# Patient Record
Sex: FEMALE | Race: WHITE | Employment: FULL TIME | URBAN - METROPOLITAN AREA
[De-identification: names, ages, dates, MRNs, and addresses within clinical notes are randomized per-mention and may not be internally consistent; named-entity substitution may affect disease eponyms.]

---

## 2017-06-30 ENCOUNTER — APPOINTMENT (OUTPATIENT)
Dept: GENERAL RADIOLOGY | Age: 67
End: 2017-06-30
Attending: PHYSICIAN ASSISTANT
Payer: COMMERCIAL

## 2017-06-30 ENCOUNTER — APPOINTMENT (OUTPATIENT)
Dept: MRI IMAGING | Age: 67
End: 2017-06-30
Attending: PHYSICIAN ASSISTANT
Payer: COMMERCIAL

## 2017-06-30 ENCOUNTER — HOSPITAL ENCOUNTER (EMERGENCY)
Age: 67
Discharge: HOME | End: 2017-06-30
Attending: EMERGENCY MEDICINE
Payer: COMMERCIAL

## 2017-06-30 VITALS
TEMPERATURE: 98 F | HEIGHT: 67 IN | OXYGEN SATURATION: 93 % | RESPIRATION RATE: 16 BRPM | BODY MASS INDEX: 35.94 KG/M2 | SYSTOLIC BLOOD PRESSURE: 131 MMHG | WEIGHT: 229 LBS | HEART RATE: 66 BPM | DIASTOLIC BLOOD PRESSURE: 70 MMHG

## 2017-06-30 DIAGNOSIS — S30.0XXA CONTUSION OF LOWER BACK, INITIAL ENCOUNTER: ICD-10-CM

## 2017-06-30 DIAGNOSIS — M51.9 LUMBAR DISC DISEASE: ICD-10-CM

## 2017-06-30 DIAGNOSIS — M48.02 CERVICAL SPINAL STENOSIS: ICD-10-CM

## 2017-06-30 DIAGNOSIS — S16.1XXA CERVICAL STRAIN, ACUTE, INITIAL ENCOUNTER: ICD-10-CM

## 2017-06-30 DIAGNOSIS — S40.011A CONTUSION OF RIGHT SHOULDER, INITIAL ENCOUNTER: ICD-10-CM
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
## 2017-06-30 PROCEDURE — 74011250637 HC RX REV CODE- 250/637: Performed by: PHYSICIAN ASSISTANT

## 2017-06-30 PROCEDURE — 73030 X-RAY EXAM OF SHOULDER: CPT

## 2017-06-30 PROCEDURE — 72100 X-RAY EXAM L-S SPINE 2/3 VWS: CPT

## 2017-06-30 PROCEDURE — 72052 X-RAY EXAM NECK SPINE 6/>VWS: CPT

## 2017-06-30 PROCEDURE — 72141 MRI NECK SPINE W/O DYE: CPT

## 2017-06-30 PROCEDURE — 72148 MRI LUMBAR SPINE W/O DYE: CPT

## 2017-06-30 PROCEDURE — 99284 EMERGENCY DEPT VISIT MOD MDM: CPT

## 2017-06-30 RX ORDER — HYDROCODONE BITARTRATE AND ACETAMINOPHEN 5; 325 MG/1; MG/1
1 TABLET ORAL
Status: COMPLETED | OUTPATIENT
Start: 2017-06-30 | End: 2017-06-30

## 2017-06-30 RX ORDER — HYDROCODONE BITARTRATE AND ACETAMINOPHEN 5; 325 MG/1; MG/1
1 TABLET ORAL
Qty: 15 TAB | Refills: 0 | Status: SHIPPED | OUTPATIENT
Start: 2017-06-30 | End: 2017-08-03

## 2017-06-30 RX ADMIN — HYDROCODONE BITARTRATE AND ACETAMINOPHEN 1 TABLET: 5; 325 TABLET ORAL at 14:29

## 2017-06-30 NOTE — ED TRIAGE NOTES
\"I slipped and fell onto my right shoulder this morning at 0530. I have pain in both shoulders, my back, and the pain is running down both legs\" Denies LOC.

## 2017-06-30 NOTE — ED PROVIDER NOTES
HPI Comments: 78 y/o  female presents to the ED for the evaluation of neck, shoulder and lower back pain after slipping and falling this morning around 5:30 AM.  According to patient, it was dark and wet and she simply slipped landing onto her back and posterior right shoulder. She denies any LOC. No headaches. She is c/o neck/shoulder and lower back pain. She also mentions some tingling in upper and lower extremities and a heaviness feeling in her lower extremities. No weakness noted. No other injuries noted. No other acute medical complaints expressed at this time. The history is provided by the patient. No  was used. Past Medical History:   Diagnosis Date    Asthma     Gastrointestinal disorder     diverticulitis    Ovarian cyst        Past Surgical History:   Procedure Laterality Date    HX APPENDECTOMY      HX CHOLECYSTECTOMY      HX HYSTERECTOMY      partial, has ovaries    HX ORTHOPAEDIC      tennis elbow         History reviewed. No pertinent family history. Social History     Social History    Marital status:      Spouse name: N/A    Number of children: N/A    Years of education: N/A     Occupational History    Not on file. Social History Main Topics    Smoking status: Never Smoker    Smokeless tobacco: Never Used    Alcohol use Yes      Comment: once per 6 months at social event    Drug use: No    Sexual activity: Not on file     Other Topics Concern    Not on file     Social History Narrative         ALLERGIES: Garlic and Penicillin g    Review of Systems   Constitutional: Negative for chills and fever. HENT: Negative for ear pain, facial swelling, hearing loss, sore throat and trouble swallowing. Eyes: Negative. Respiratory: Negative for cough, chest tightness, shortness of breath and wheezing. Cardiovascular: Negative for chest pain.    Gastrointestinal: Negative for abdominal pain, constipation, diarrhea, nausea and vomiting. Genitourinary: Negative for dysuria, flank pain, frequency, hematuria and urgency. Musculoskeletal: Positive for back pain and neck pain. Negative for neck stiffness. Skin: Negative. Neurological: Negative for dizziness, weakness, light-headedness and headaches. Tingling    Hematological: Does not bruise/bleed easily. Psychiatric/Behavioral: Negative. All other systems reviewed and are negative. Vitals:    06/30/17 1242 06/30/17 1534 06/30/17 1600   BP: 163/75 155/69 138/63   Pulse: 76 66    Resp: 18 16    Temp: 98.6 °F (37 °C)     SpO2: 96% 97% 98%   Weight: 103.9 kg (229 lb)     Height: 5' 6.5\" (1.689 m)              Physical Exam   Constitutional: She is oriented to person, place, and time. She appears well-developed and well-nourished. HENT:   Head: Normocephalic and atraumatic. Eyes: Conjunctivae and EOM are normal. Pupils are equal, round, and reactive to light. Neck: Normal range of motion and full passive range of motion without pain. Neck supple. Muscular tenderness present. No spinous process tenderness present. No rigidity. No edema, no erythema and normal range of motion present. No midline tenderness to palpation of cspine. Bilateral hand  5/5    Cardiovascular: Normal rate, regular rhythm, normal heart sounds and intact distal pulses. Exam reveals no gallop and no friction rub. No murmur heard. Pulmonary/Chest: Effort normal and breath sounds normal. No respiratory distress. She has no wheezes. She has no rales. She exhibits no tenderness. Abdominal: Soft. Bowel sounds are normal. She exhibits no distension and no mass. There is no tenderness. There is no rebound and no guarding. No aortic bruits,  No femoral bruits. 2+ femoral pulses     Musculoskeletal: Normal range of motion. She exhibits tenderness. She exhibits no edema. No TS spine pain with palpation. Mild Lspine pain with palpation.   No stepoffs, no deformity  No redness, rashes, warmth, or cellulitis. 5/5 flexion/extension of hips bilaterally  5/5 great toes strength bilaterally  5/5 dorsiflexion/plantarflexion bilaterally  Straight leg raise negative. No saddle anesthesia present. Walks on heels/toes. Neurological: She is alert and oriented to person, place, and time. She has normal strength and normal reflexes. She is not disoriented. No cranial nerve deficit or sensory deficit. She exhibits normal muscle tone. Coordination and gait normal. GCS eye subscore is 4. GCS verbal subscore is 5. GCS motor subscore is 6. She displays no Babinski's sign on the right side. She displays no Babinski's sign on the left side. Reflex Scores:       Bicep reflexes are 2+ on the right side and 2+ on the left side. Patellar reflexes are 2+ on the right side and 2+ on the left side. Negative clonus    Skin: Skin is warm and dry. No rash noted. No erythema. Psychiatric: She has a normal mood and affect. Her behavior is normal. Judgment and thought content normal.   Nursing note and vitals reviewed. [facility]  ED Course       Procedures    78 y/o female with fall and paresthesias in upper/lower extremities and heaviness feeling in lower extremities  Reviewed xr findings with patient. Despite normal neuro exam, with sensation of heaviness and paresthesias will get mri c/l spine w/o contrast after consulting radiology  Patient already starting to feel better but aware of plan for mri c/l spine  Discussed with and evaluated by Dr. Ton Coronado results with patient.     Looking back at old labs she does have hx of anemia which would likely explain the dark marrow  Will d/c home with hydrocodone and have her pcp refer her back to see pain management  Patient verbalizes understanding of dx and is aware of what s/sx to monitor that would warrant return visit to ED  Discussed with Dr. Wilfredo Fitzpatrick

## 2017-06-30 NOTE — DISCHARGE INSTRUCTIONS
Neck Strain: Care Instructions  Your Care Instructions  You have strained the muscles and ligaments in your neck. A sudden, awkward movement can strain the neck. This often occurs with falls or car accidents or during certain sports. Everyday activities like working on a computer or sleeping can also cause neck strain if they force you to hold your neck in an awkward position for a long time. It is common for neck pain to get worse for a day or two after an injury, but it should start to feel better after that. You may have more pain and stiffness for several days before it gets better. This is expected. It may take a few weeks or longer for it to heal completely. Good home treatment can help you get better faster and avoid future neck problems. Follow-up care is a key part of your treatment and safety. Be sure to make and go to all appointments, and call your doctor if you are having problems. It's also a good idea to know your test results and keep a list of the medicines you take. How can you care for yourself at home? · If you were given a neck brace (cervical collar) to limit neck motion, wear it as instructed for as many days as your doctor tells you to. Do not wear it longer than you were told to. Wearing a brace for too long can make neck stiffness worse and weaken the neck muscles. · You can try using heat or ice to see if it helps. ¨ Try using a heating pad on a low or medium setting for 15 to 20 minutes every 2 to 3 hours. Try a warm shower in place of one session with the heating pad. You can also buy single-use heat wraps that last up to 8 hours. ¨ You can also try an ice pack for 10 to 15 minutes every 2 to 3 hours. · Take pain medicines exactly as directed. ¨ If the doctor gave you a prescription medicine for pain, take it as prescribed. ¨ If you are not taking a prescription pain medicine, ask your doctor if you can take an over-the-counter medicine.   · Gently rub the area to relieve pain and help with blood flow. Do not massage the area if it hurts to do so. · Do not do anything that makes the pain worse. Take it easy for a couple of days. You can do your usual activities if they do not hurt your neck or put it at risk for more stress or injury. · Try sleeping on a special neck pillow. Place it under your neck, not under your head. Placing a tightly rolled-up towel under your neck while you sleep will also work. If you use a neck pillow or rolled towel, do not use your regular pillow at the same time. · To prevent future neck pain, do exercises to stretch and strengthen your neck and back. Learn how to use good posture, safe lifting techniques, and proper body mechanics. When should you call for help? Call 911 anytime you think you may need emergency care. For example, call if:  · You are unable to move an arm or a leg at all. Call your doctor now or seek immediate medical care if:  · You have new or worse symptoms in your arms, legs, chest, belly, or buttocks. Symptoms may include:  ¨ Numbness or tingling. ¨ Weakness. ¨ Pain. · You lose bladder or bowel control. Watch closely for changes in your health, and be sure to contact your doctor if:  · You are not getting better as expected. Where can you learn more? Go to http://miranda-maren.info/. Enter M253 in the search box to learn more about \"Neck Strain: Care Instructions. \"  Current as of: March 21, 2017  Content Version: 11.3  © 2974-5513 [employer]. Care instructions adapted under license by [employer] (which disclaims liability or warranty for this information). If you have questions about a medical condition or this instruction, always ask your healthcare professional. [place] [address] any warranty or liability for your use of this information.          Cervical Spinal Stenosis: Care Instructions  Your Care Instructions    Spinal stenosis is a narrowing of the canal that surrounds the spinal cord and nerve roots. Sometimes bone and other tissue grow into this canal and press on the nerves that branch out from the spinal cord. This can happen as a part of aging. When the narrowing happens in your neck, it's called cervical spinal stenosis. It often causes stiffness, pain, numbness, and weakness in the neck, shoulders, arms, hands, or legs. It can even cause problems with your balance, coordination, and bowel or bladder control. But some people have no symptoms. You may be able to get relief from the symptoms of spinal stenosis by taking pain medicine. Your doctor may suggest physical therapy and exercises to keep your spine strong and flexible. Some people try steroid shots to reduce swelling. If pain and numbness in your neck, arms, or legs are still so bad that you cannot do your normal activities, you may need surgery. Follow-up care is a key part of your treatment and safety. Be sure to make and go to all appointments, and call your doctor if you are having problems. It's also a good idea to know your test results and keep a list of the medicines you take. How can you care for yourself at home? · Ask your doctor if you can take an over-the-counter pain medicine, such as acetaminophen (Tylenol), ibuprofen (Advil, Motrin), or naproxen (Aleve). Be safe with medicines. Read and follow all instructions on the label. · Do not take two or more pain medicines at the same time unless the doctor told you to. Many pain medicines have acetaminophen, which is Tylenol. Too much acetaminophen (Tylenol) can be harmful. · Change positions often when you are standing or sitting. This may reduce pressure on the spinal cord and its nerves. · When you rest, use pillows or towel rolls to support your neck and head in a comfortable position. · Follow your doctor's instructions about activity. He or she may tell you not to do sports or activities that could injure your neck.   · Stretch your neck and shoulders as your doctor or physical therapist recommends. If your doctor says it is okay to do them, these exercises may help:  ¨ Neck stretches to the side. Keep your shoulders relaxed and slowly tilt your head straight over toward one shoulder. Hold for 15 seconds. Let the weight of your head stretch your muscles. Then do the same toward the other shoulder. ¨ Neck rotations. Keep your chin level and slowly turn your head to one side. Hold for 15 seconds. Then do the same to the other side. ¨ Shoulder rolls. Roll your shoulders up, then back, and then down in a smooth, circular motion. Repeat several times. When should you call for help? Call 911 anytime you think you may need emergency care. For example, call if:  · You are unable to move an arm or a leg at all. Call your doctor now or seek immediate medical care if:  · You have new or worse symptoms in your neck, arms, or legs. Symptoms may include:  ¨ Numbness or tingling. ¨ Weakness. ¨ Pain. · You lose bladder or bowel control. Watch closely for changes in your health, and be sure to contact your doctor if:  · You are not getting better as expected. Where can you learn more? Go to http://mirandaOY LX Therapiesmaren.info/. Enter  in the search box to learn more about \"Cervical Spinal Stenosis: Care Instructions. \"  Current as of: March 21, 2017  Content Version: 11.3  © 0001-9839 [employer]. Care instructions adapted under license by [employer] (which disclaims liability or warranty for this information). If you have questions about a medical condition or this instruction, always ask your healthcare professional. [address] any warranty or liability for your use of this information. Low Back Contusion: Care Instructions  Your Care Instructions  Contusion is the medical term for a bruise.  When you have a low back bruise, it's often caused by a direct blow or an impact, such as falling against a counter or table. Bruises are common sports injuries. Most people think of a bruise as a black-and-blue spot. This happens when small blood vessels get torn and leak blood under the skin. But bones, muscles, and organs can also get bruised. If these deep tissues are damaged, you may not always see a bruise. The doctor will examine your bruise. You may also have tests to make sure you do not have a more serious injury, such as a broken bone or nerve damage. Tests may include X-rays or other imaging tests like a CT scan or MRI. Low back bruises may cause pain and swelling. But if there is no serious damage, they will often get better with home treatment in several days to a few weeks. The doctor has checked you carefully, but problems can develop later. If you notice any problems or new symptoms, get medical treatment right away. Follow-up care is a key part of your treatment and safety. Be sure to make and go to all appointments, and call your doctor if you are having problems. It's also a good idea to know your test results and keep a list of the medicines you take. How can you care for yourself at home? · Put ice or a cold pack on the sore area for 10 to 20 minutes at a time to stop swelling. Put a thin cloth between the ice pack and your skin. · Be safe with medicines. Read and follow all instructions on the label. ¨ If the doctor gave you a prescription medicine for pain, take it as prescribed. ¨ If you are not taking a prescription pain medicine, ask your doctor if you can take an over-the-counter medicine. · For the first day or two of pain, take it easy. But as soon as possible, get back to your normal daily life and activities. · Get gentle exercise, such as walking. Movement keeps your spine flexible and helps your muscles stay strong. When should you call for help? Call 911 anytime you think you may need emergency care.  For example, call if:  · You are unable to move a leg at all. Call your doctor now or seek immediate medical care if:  · You have new or worse symptoms in your legs or buttocks. Symptoms may include:  ¨ Numbness or tingling. ¨ Weakness. ¨ Pain. · You lose bladder or bowel control. · You have blood in your urine. Watch closely for changes in your health, and be sure to contact your doctor if:  · You do not get better as expected. Where can you learn more? Go to http://miranda-maren.info/. Enter V337 in the search box to learn more about \"Low Back Contusion: Care Instructions. \"  Current as of: March 20, 2017  Content Version: 11.3  © 1642-0807 [facility]. Care instructions adapted under license by [employer] (which disclaims liability or warranty for this information). If you have questions about a medical condition or this instruction, always ask your healthcare professional. [address] any warranty or liability for your use of this information.

## 2017-08-03 ENCOUNTER — APPOINTMENT (OUTPATIENT)
Dept: ULTRASOUND IMAGING | Age: 67
DRG: 603 | End: 2017-08-03
Attending: EMERGENCY MEDICINE
Payer: COMMERCIAL

## 2017-08-03 ENCOUNTER — APPOINTMENT (OUTPATIENT)
Dept: GENERAL RADIOLOGY | Age: 67
DRG: 603 | End: 2017-08-03
Attending: EMERGENCY MEDICINE
Payer: COMMERCIAL

## 2017-08-03 ENCOUNTER — HOSPITAL ENCOUNTER (INPATIENT)
Age: 67
LOS: 4 days | Discharge: HOME | DRG: 603 | End: 2017-08-07
Attending: EMERGENCY MEDICINE | Admitting: HOSPITALIST
Payer: COMMERCIAL

## 2017-08-03 DIAGNOSIS — L03.116 CELLULITIS OF LEFT LOWER EXTREMITY: Primary | ICD-10-CM

## 2017-08-03 LAB
ALBUMIN SERPL BCP-MCNC: 3.7 G/DL (ref 3.5–5)
ALBUMIN/GLOB SERPL: 0.9 {RATIO} (ref 1.1–2.2)
ALP SERPL-CCNC: 78 U/L (ref 45–117)
ALT SERPL-CCNC: 15 U/L (ref 12–78)
ANION GAP BLD CALC-SCNC: 8 MMOL/L (ref 5–15)
AST SERPL W P-5'-P-CCNC: 12 U/L (ref 15–37)
BASOPHILS # BLD AUTO: 0 K/UL (ref 0–0.1)
BASOPHILS # BLD: 0 % (ref 0–1)
BILIRUB SERPL-MCNC: 0.3 MG/DL (ref 0.2–1)
BUN SERPL-MCNC: 24 MG/DL (ref 6–20)
BUN/CREAT SERPL: 28 (ref 12–20)
CALCIUM SERPL-MCNC: 9 MG/DL (ref 8.5–10.1)
CHLORIDE SERPL-SCNC: 98 MMOL/L (ref 97–108)
CO2 SERPL-SCNC: 29 MMOL/L (ref 21–32)
CREAT SERPL-MCNC: 0.86 MG/DL (ref 0.55–1.02)
EOSINOPHIL # BLD: 0.3 K/UL (ref 0–0.4)
EOSINOPHIL NFR BLD: 3 % (ref 0–7)
ERYTHROCYTE [DISTWIDTH] IN BLOOD BY AUTOMATED COUNT: 13.2 % (ref 11.5–14.5)
GLOBULIN SER CALC-MCNC: 4.1 G/DL (ref 2–4)
GLUCOSE SERPL-MCNC: 137 MG/DL (ref 65–100)
HCT VFR BLD AUTO: 36.1 % (ref 35–47)
HGB BLD-MCNC: 11.7 G/DL (ref 11.5–16)
LACTATE SERPL-SCNC: 2 MMOL/L (ref 0.4–2)
LYMPHOCYTES # BLD AUTO: 10 % (ref 12–49)
LYMPHOCYTES # BLD: 1.1 K/UL (ref 0.8–3.5)
MCH RBC QN AUTO: 28.7 PG (ref 26–34)
MCHC RBC AUTO-ENTMCNC: 32.4 G/DL (ref 30–36.5)
MCV RBC AUTO: 88.7 FL (ref 80–99)
MONOCYTES # BLD: 0.7 K/UL (ref 0–1)
MONOCYTES NFR BLD AUTO: 6 % (ref 5–13)
NEUTS SEG # BLD: 9.2 K/UL (ref 1.8–8)
NEUTS SEG NFR BLD AUTO: 81 % (ref 32–75)
PLATELET # BLD AUTO: 283 K/UL (ref 150–400)
POTASSIUM SERPL-SCNC: 3.5 MMOL/L (ref 3.5–5.1)
PROT SERPL-MCNC: 7.8 G/DL (ref 6.4–8.2)
RBC # BLD AUTO: 4.07 M/UL (ref 3.8–5.2)
SODIUM SERPL-SCNC: 135 MMOL/L (ref 136–145)
WBC # BLD AUTO: 11.4 K/UL (ref 3.6–11)

## 2017-08-03 PROCEDURE — 87040 BLOOD CULTURE FOR BACTERIA: CPT | Performed by: EMERGENCY MEDICINE

## 2017-08-03 PROCEDURE — 99284 EMERGENCY DEPT VISIT MOD MDM: CPT

## 2017-08-03 PROCEDURE — 85025 COMPLETE CBC W/AUTO DIFF WBC: CPT | Performed by: EMERGENCY MEDICINE

## 2017-08-03 PROCEDURE — 36415 COLL VENOUS BLD VENIPUNCTURE: CPT | Performed by: EMERGENCY MEDICINE

## 2017-08-03 PROCEDURE — 65270000029 HC RM PRIVATE

## 2017-08-03 PROCEDURE — 96361 HYDRATE IV INFUSION ADD-ON: CPT

## 2017-08-03 PROCEDURE — 80053 COMPREHEN METABOLIC PANEL: CPT | Performed by: EMERGENCY MEDICINE

## 2017-08-03 PROCEDURE — 73630 X-RAY EXAM OF FOOT: CPT

## 2017-08-03 PROCEDURE — 74011250636 HC RX REV CODE- 250/636: Performed by: EMERGENCY MEDICINE

## 2017-08-03 PROCEDURE — 76999 ECHO EXAMINATION PROCEDURE: CPT

## 2017-08-03 PROCEDURE — 83605 ASSAY OF LACTIC ACID: CPT | Performed by: EMERGENCY MEDICINE

## 2017-08-03 PROCEDURE — 96365 THER/PROPH/DIAG IV INF INIT: CPT

## 2017-08-03 PROCEDURE — 96375 TX/PRO/DX INJ NEW DRUG ADDON: CPT

## 2017-08-03 RX ORDER — ACETAMINOPHEN 325 MG/1
650 TABLET ORAL
Status: DISCONTINUED | OUTPATIENT
Start: 2017-08-03 | End: 2017-08-07 | Stop reason: HOSPADM

## 2017-08-03 RX ORDER — CLINDAMYCIN PHOSPHATE 600 MG/50ML
600 INJECTION INTRAVENOUS EVERY 12 HOURS
Status: DISCONTINUED | OUTPATIENT
Start: 2017-08-04 | End: 2017-08-07 | Stop reason: HOSPADM

## 2017-08-03 RX ORDER — SODIUM CHLORIDE 9 MG/ML
75 INJECTION, SOLUTION INTRAVENOUS CONTINUOUS
Status: DISCONTINUED | OUTPATIENT
Start: 2017-08-03 | End: 2017-08-07 | Stop reason: HOSPADM

## 2017-08-03 RX ORDER — ACETAMINOPHEN 325 MG/1
650 TABLET ORAL
Status: DISCONTINUED | OUTPATIENT
Start: 2017-08-03 | End: 2017-08-03

## 2017-08-03 RX ORDER — SODIUM CHLORIDE 0.9 % (FLUSH) 0.9 %
5-10 SYRINGE (ML) INJECTION AS NEEDED
Status: DISCONTINUED | OUTPATIENT
Start: 2017-08-03 | End: 2017-08-07 | Stop reason: HOSPADM

## 2017-08-03 RX ORDER — MORPHINE SULFATE 10 MG/ML
6 INJECTION, SOLUTION INTRAMUSCULAR; INTRAVENOUS
Status: COMPLETED | OUTPATIENT
Start: 2017-08-03 | End: 2017-08-03

## 2017-08-03 RX ORDER — SODIUM CHLORIDE 0.9 % (FLUSH) 0.9 %
5-10 SYRINGE (ML) INJECTION EVERY 8 HOURS
Status: DISCONTINUED | OUTPATIENT
Start: 2017-08-03 | End: 2017-08-07 | Stop reason: HOSPADM

## 2017-08-03 RX ORDER — CLINDAMYCIN PHOSPHATE 600 MG/50ML
600 INJECTION INTRAVENOUS
Status: COMPLETED | OUTPATIENT
Start: 2017-08-03 | End: 2017-08-03

## 2017-08-03 RX ORDER — ENOXAPARIN SODIUM 100 MG/ML
40 INJECTION SUBCUTANEOUS EVERY 24 HOURS
Status: DISCONTINUED | OUTPATIENT
Start: 2017-08-04 | End: 2017-08-07 | Stop reason: HOSPADM

## 2017-08-03 RX ADMIN — SODIUM CHLORIDE 1000 ML: 900 INJECTION, SOLUTION INTRAVENOUS at 21:50

## 2017-08-03 RX ADMIN — CLINDAMYCIN PHOSPHATE 600 MG: 12 INJECTION, SOLUTION INTRAMUSCULAR; INTRAVENOUS at 21:50

## 2017-08-03 RX ADMIN — MORPHINE SULFATE 6 MG: 10 INJECTION, SOLUTION INTRAMUSCULAR; INTRAVENOUS at 21:50

## 2017-08-03 NOTE — IP AVS SNAPSHOT
[address] [facility] [address] [place] [address] 
[phone] Patient: Rochelle Donnelly 
MRN: [id number] You are allergic to the following Allergen Reactions Garlic Hives Penicillin G Hives Recent Documentation Height Weight BMI OB Status Smoking Status 1.676 m 103.9 kg 36.96 kg/m2 Hysterectomy Never Smoker Unresulted Labs Order Current Status CULTURE, BLOOD Preliminary result CULTURE, BLOOD Preliminary result Emergency Contacts Name Discharge Info Relation Home Work Mobile [facility] DISCHARGE CAREGIVER [3] Sister [23] [phone] About your hospitalization You were admitted on:  August 3, 2017 You last received care in the:  [facility] [phone] You were discharged on:  August 7, 2017 Unit phone number:  [phone] Why you were hospitalized Your primary diagnosis was:  Not on File Your diagnoses also included:  Cellulitis Of Left Lower Extremity Providers Seen During Your Hospitalizations Provider Role Specialty Primary office phone Micah Rios MD Attending Provider Emergency Medicine [phone] Maury Fontenot MD Attending Provider [facility] [phone] Marco Michel MD Attending Provider Internal Medicine [phone] Your Primary Care Physician (PCP) Primary Care Physician Office Phone Office Fax OTHER, PHYS ** None ** ** None ** Follow-up Information Follow up With Details Comments Contact Info Lindsay Barron DPM Schedule an appointment as soon as possible for a visit in 1 week  2008 [address]101 [facility] [address] [facility] [address] [place] [address] 
[phone] Current Discharge Medication List  
  
START taking these medications Dose & Instructions Dispensing Information Comments Morning Noon Evening Bedtime clindamycin 300 mg capsule Commonly known as:  CLEOCIN Your last dose was: Your next dose is:    
   
   
 Dose:  300 mg Take 1 Cap by mouth three (3) times daily. Quantity:  45 Cap Refills:  0  
     
   
   
   
  
 traMADol 100 mg tablet Commonly known as:  ULTRAM-ER Your last dose was: Your next dose is:    
   
   
 Dose:  100 mg Take 1 Tab by mouth daily. Max Daily Amount: 100 mg. Quantity:  10 Tab Refills:  0 CONTINUE these medications which have NOT CHANGED Dose & Instructions Dispensing Information Comments Morning Noon Evening Bedtime ADVAIR DISKUS 100-50 mcg/dose diskus inhaler Generic drug:  fluticasone-salmeterol Your last dose was: Your next dose is:    
   
   
 Dose:  1 Puff Take 1 Puff by inhalation every twelve (12) hours. Refills:  0  
     
   
   
   
  
 albuterol 90 mcg/actuation inhaler Commonly known as:  PROVENTIL HFA, VENTOLIN HFA, PROAIR HFA Your last dose was: Your next dose is:    
   
   
 Dose:  1 Puff Take 1 Puff by inhalation every four (4) hours as needed for Wheezing. Refills:  0  
     
   
   
   
  
 dicyclomine 10 mg capsule Commonly known as:  BENTYL Your last dose was: Your next dose is:    
   
   
 Dose:  10 mg Take 10 mg by mouth two (2) times a day. Refills:  0  
     
   
   
   
  
 escitalopram oxalate 10 mg tablet Commonly known as:  Yohana Roof Your last dose was: Your next dose is:    
   
   
 Dose:  10 mg Take 10 mg by mouth daily. Refills:  0  
     
   
   
   
  
 hydroCHLOROthiazide 25 mg tablet Commonly known as:  HYDRODIURIL Your last dose was: Your next dose is:    
   
   
 Dose:  25 mg Take 25 mg by mouth daily. Refills:  0 PREMARIN 0.625 mg tablet Generic drug:  conjugated estrogens Your last dose was: Your next dose is: Dose:  0.625 mg Take 0.625 mg by mouth daily. Refills:  0 Where to Get Your Medications Information on where to get these meds will be given to you by the nurse or doctor. ! Ask your nurse or doctor about these medications  
  clindamycin 300 mg capsule  
 traMADol 100 mg tablet Discharge Instructions Need local care in daily basis. MUST FOLLOW UP with podiatry. Return to ER if fever develop and redness and pain. Discharge Instructions 1.) Follow up with Dr. Seven Saldivar ([phone]) in one week. 2.) Apply moistened aquacell Ag and gauze dressing to left 2nd toe wound daily. 3.) Full weightbearing to left foot with surgical shoe. Elevate foot when at rest.  
   
 
 
 
Discharge Orders None Introducing [facility] SERVICES! Quentin Mina introduces [employer] patient portal. Now you can access parts of your medical record, email your doctor's office, and request medication refills online. 1. In your internet browser, go to [email or web address] 2. Click on the First Time User? Click Here link in the Sign In box. You will see the New Member Sign Up page. 3. Enter your [employer] Access Code exactly as it appears below. You will not need to use this code after youve completed the sign-up process. If you do not sign up before the expiration date, you must request a new code. · [employer] Access Code: [email or web address] Expires: 9/28/2017  7:20 PM 
 
4. Enter the last four digits of your Social Security Number (xxxx) and Date of Birth (mm/dd/yyyy) as indicated and click Submit. You will be taken to the next sign-up page. 5. Create a ThoroughCaret ID. This will be your [employer] login ID and cannot be changed, so think of one that is secure and easy to remember. 6. Create a [employer] password. You can change your password at any time. 7. Enter your Password Reset Question and Answer.  This can be used at a later time if you forget your password. 8. Enter your e-mail address. You will receive e-mail notification when new information is available in [address] 9. Click Sign Up. You can now view and download portions of your medical record. 10. Click the Download Summary menu link to download a portable copy of your medical information. If you have questions, please visit the Frequently Asked Questions section of the [employer] website. Remember, [employer] is NOT to be used for urgent needs. For medical emergencies, dial 911. Now available from your iPhone and Android! General Information Please provide this summary of care documentation to your next provider. Patient Signature:  ____________________________________________________________ Date:  ____________________________________________________________  
  
[facility] Provider Signature:  ____________________________________________________________ Date:  ____________________________________________________________

## 2017-08-03 NOTE — IP AVS SNAPSHOT
[address] 
[phone] Patient: Bj Payne 
MRN: [id number] Current Discharge Medication List  
  
START taking these medications Dose & Instructions Dispensing Information Comments Morning Noon Evening Bedtime  
 clindamycin 300 mg capsule Commonly known as:  CLEOCIN Your last dose was: Your next dose is:    
   
   
 Dose:  300 mg Take 1 Cap by mouth three (3) times daily. Quantity:  45 Cap Refills:  0  
     
   
   
   
  
 traMADol 100 mg tablet Commonly known as:  ULTRAM-ER Your last dose was: Your next dose is:    
   
   
 Dose:  100 mg Take 1 Tab by mouth daily. Max Daily Amount: 100 mg. Quantity:  10 Tab Refills:  0 CONTINUE these medications which have NOT CHANGED Dose & Instructions Dispensing Information Comments Morning Noon Evening Bedtime ADVAIR DISKUS 100-50 mcg/dose diskus inhaler Generic drug:  fluticasone-salmeterol Your last dose was: Your next dose is:    
   
   
 Dose:  1 Puff Take 1 Puff by inhalation every twelve (12) hours. Refills:  0  
     
   
   
   
  
 albuterol 90 mcg/actuation inhaler Commonly known as:  PROVENTIL HFA, VENTOLIN HFA, PROAIR HFA Your last dose was: Your next dose is:    
   
   
 Dose:  1 Puff Take 1 Puff by inhalation every four (4) hours as needed for Wheezing. Refills:  0  
     
   
   
   
  
 dicyclomine 10 mg capsule Commonly known as:  BENTYL Your last dose was: Your next dose is:    
   
   
 Dose:  10 mg Take 10 mg by mouth two (2) times a day. Refills:  0  
     
   
   
   
  
 escitalopram oxalate 10 mg tablet Commonly known as:  Argelia Ring Your last dose was: Your next dose is:    
   
   
 Dose:  10 mg Take 10 mg by mouth daily. Refills:  0 hydroCHLOROthiazide 25 mg tablet Commonly known as:  HYDRODIURIL Your last dose was: Your next dose is:    
   
   
 Dose:  25 mg Take 25 mg by mouth daily. Refills:  0 PREMARIN 0.625 mg tablet Generic drug:  conjugated estrogens Your last dose was: Your next dose is:    
   
   
 Dose:  0.625 mg Take 0.625 mg by mouth daily. Refills:  0 Where to Get Your Medications Information on where to get these meds will be given to you by the nurse or doctor. ! Ask your nurse or doctor about these medications  
  clindamycin 300 mg capsule  
 traMADol 100 mg tablet

## 2017-08-03 NOTE — IP AVS SNAPSHOT
Summary of Care Report The Summary of Care report has been created to help improve care coordination. Users with access to [employer] or [address] (Web-based application) may access additional patient information including the Discharge Summary. If you are not currently a [address] user and need more information, please call the number listed below in the [place] [address] section and ask to be connected with Medical Records. Facility Information Name Address Phone [phone] [facility] [place] [phone] Patient Information Patient Name Sex  Anthony Parker ([id number]) Female 1950 Discharge Information Admitting Provider Service Area Unit Yuriy Griggs MD / [facility] Surgical [facility] / [phone] Discharge Provider Discharge Date/Time Discharge Disposition Destination (none) 2017 Afternoon (Pending) AHR (none) Patient Language Language ENGLISH [13] Hospital Problems as of 2017  Reviewed: 2017  2:46 PM by Cale Cordon MD  
  
  
  
 Class Noted - Resolved Last Modified POA Active Problems Cellulitis of left lower extremity  8/3/2017 - Present 2017 by Cale Cordon MD Yes Entered by Yuriy Griggs MD  
  
Non-Hospital Problems as of 2017  Reviewed: 2017  2:46 PM by Cale Cordon MD  
  
  
  
 Class Noted - Resolved Last Modified Active Problems Foot abscess, left  2016 - Present 2016 by Naomy Prather MD  
  Entered by Naomy Prather MD  
  Hypokalemia  2016 - Present 2016 by Naomy Prather MD  
  Entered by Naomy Prather MD  
  
You are allergic to the following Allergen Reactions Garlic Hives Penicillin G Hives Current Discharge Medication List  
  
 START taking these medications Dose & Instructions Dispensing Information Comments  
 clindamycin 300 mg capsule Commonly known as:  CLEOCIN Dose:  300 mg Take 1 Cap by mouth three (3) times daily. Quantity:  45 Cap Refills:  0  
   
 traMADol 100 mg tablet Commonly known as:  ULTRAM-ER Dose:  100 mg Take 1 Tab by mouth daily. Max Daily Amount: 100 mg. Quantity:  10 Tab Refills:  0 CONTINUE these medications which have NOT CHANGED Dose & Instructions Dispensing Information Comments ADVAIR DISKUS 100-50 mcg/dose diskus inhaler Generic drug:  fluticasone-salmeterol Dose:  1 Puff Take 1 Puff by inhalation every twelve (12) hours. Refills:  0  
   
 albuterol 90 mcg/actuation inhaler Commonly known as:  PROVENTIL HFA, VENTOLIN HFA, PROAIR HFA Dose:  1 Puff Take 1 Puff by inhalation every four (4) hours as needed for Wheezing. Refills:  0  
   
 dicyclomine 10 mg capsule Commonly known as:  BENTYL Dose:  10 mg Take 10 mg by mouth two (2) times a day. Refills:  0  
   
 escitalopram oxalate 10 mg tablet Commonly known as:  Jolly Huy Dose:  10 mg Take 10 mg by mouth daily. Refills:  0  
   
 hydroCHLOROthiazide 25 mg tablet Commonly known as:  HYDRODIURIL Dose:  25 mg Take 25 mg by mouth daily. Refills:  0 PREMARIN 0.625 mg tablet Generic drug:  conjugated estrogens Dose:  0.625 mg Take 0.625 mg by mouth daily. Refills:  0 Current Immunizations Name Date Rabies Vaccine IM 7/11/2014 Tdap 7/11/2014 Follow-up Information Follow up With Details Comments Contact Info Elly Stone DPM Schedule an appointment as soon as possible for a visit in 1 week  [address] [place] [address] and [facility] [address] [place] [address] 
[phone] Discharge Instructions Need local care in daily basis. MUST FOLLOW UP with podiatry. Return to ER if fever develop and redness and pain. Discharge Instructions 1.) Follow up with Dr. Tara Mattson ([phone]) in one week. 2.) Apply moistened aquacell Ag and gauze dressing to left 2nd toe wound daily. 3.) Full weightbearing to left foot with surgical shoe. Elevate foot when at rest.  
   
 
 
 
Chart Review Routing History No Routing History on File

## 2017-08-04 LAB
ANION GAP BLD CALC-SCNC: 7 MMOL/L (ref 5–15)
BASOPHILS # BLD AUTO: 0 K/UL (ref 0–0.1)
BASOPHILS # BLD: 0 % (ref 0–1)
BUN SERPL-MCNC: 21 MG/DL (ref 6–20)
BUN/CREAT SERPL: 28 (ref 12–20)
CALCIUM SERPL-MCNC: 8.2 MG/DL (ref 8.5–10.1)
CHLORIDE SERPL-SCNC: 99 MMOL/L (ref 97–108)
CO2 SERPL-SCNC: 30 MMOL/L (ref 21–32)
CREAT SERPL-MCNC: 0.75 MG/DL (ref 0.55–1.02)
EOSINOPHIL # BLD: 0.2 K/UL (ref 0–0.4)
EOSINOPHIL NFR BLD: 3 % (ref 0–7)
ERYTHROCYTE [DISTWIDTH] IN BLOOD BY AUTOMATED COUNT: 13.4 % (ref 11.5–14.5)
GLUCOSE SERPL-MCNC: 134 MG/DL (ref 65–100)
HCT VFR BLD AUTO: 30.9 % (ref 35–47)
HGB BLD-MCNC: 9.8 G/DL (ref 11.5–16)
LYMPHOCYTES # BLD AUTO: 22 % (ref 12–49)
LYMPHOCYTES # BLD: 1.6 K/UL (ref 0.8–3.5)
MCH RBC QN AUTO: 28.2 PG (ref 26–34)
MCHC RBC AUTO-ENTMCNC: 31.7 G/DL (ref 30–36.5)
MCV RBC AUTO: 88.8 FL (ref 80–99)
MONOCYTES # BLD: 0.4 K/UL (ref 0–1)
MONOCYTES NFR BLD AUTO: 6 % (ref 5–13)
NEUTS SEG # BLD: 5.2 K/UL (ref 1.8–8)
NEUTS SEG NFR BLD AUTO: 69 % (ref 32–75)
PLATELET # BLD AUTO: 264 K/UL (ref 150–400)
POTASSIUM SERPL-SCNC: 3.4 MMOL/L (ref 3.5–5.1)
RBC # BLD AUTO: 3.48 M/UL (ref 3.8–5.2)
SODIUM SERPL-SCNC: 136 MMOL/L (ref 136–145)
WBC # BLD AUTO: 7.5 K/UL (ref 3.6–11)

## 2017-08-04 PROCEDURE — 74011250637 HC RX REV CODE- 250/637: Performed by: HOSPITALIST

## 2017-08-04 PROCEDURE — 87205 SMEAR GRAM STAIN: CPT | Performed by: HOSPITALIST

## 2017-08-04 PROCEDURE — 85025 COMPLETE CBC W/AUTO DIFF WBC: CPT | Performed by: HOSPITALIST

## 2017-08-04 PROCEDURE — 74011250636 HC RX REV CODE- 250/636: Performed by: HOSPITALIST

## 2017-08-04 PROCEDURE — 74011000250 HC RX REV CODE- 250: Performed by: HOSPITALIST

## 2017-08-04 PROCEDURE — 87077 CULTURE AEROBIC IDENTIFY: CPT | Performed by: HOSPITALIST

## 2017-08-04 PROCEDURE — 74011250636 HC RX REV CODE- 250/636: Performed by: NURSE PRACTITIONER

## 2017-08-04 PROCEDURE — 36415 COLL VENOUS BLD VENIPUNCTURE: CPT | Performed by: HOSPITALIST

## 2017-08-04 PROCEDURE — 65270000032 HC RM SEMIPRIVATE

## 2017-08-04 PROCEDURE — 87186 SC STD MICRODIL/AGAR DIL: CPT | Performed by: HOSPITALIST

## 2017-08-04 PROCEDURE — 77030029684 HC NEB SM VOL KT MONA -A

## 2017-08-04 PROCEDURE — 94640 AIRWAY INHALATION TREATMENT: CPT

## 2017-08-04 PROCEDURE — 87147 CULTURE TYPE IMMUNOLOGIC: CPT | Performed by: HOSPITALIST

## 2017-08-04 PROCEDURE — 80048 BASIC METABOLIC PNL TOTAL CA: CPT | Performed by: HOSPITALIST

## 2017-08-04 RX ORDER — HYDROCHLOROTHIAZIDE 25 MG/1
25 TABLET ORAL DAILY
Status: DISCONTINUED | OUTPATIENT
Start: 2017-08-04 | End: 2017-08-07 | Stop reason: HOSPADM

## 2017-08-04 RX ORDER — ESCITALOPRAM OXALATE 10 MG/1
10 TABLET ORAL DAILY
Status: DISCONTINUED | OUTPATIENT
Start: 2017-08-04 | End: 2017-08-07 | Stop reason: HOSPADM

## 2017-08-04 RX ORDER — ALBUTEROL SULFATE 0.83 MG/ML
2.5 SOLUTION RESPIRATORY (INHALATION)
Status: DISCONTINUED | OUTPATIENT
Start: 2017-08-04 | End: 2017-08-07 | Stop reason: HOSPADM

## 2017-08-04 RX ORDER — ONDANSETRON 2 MG/ML
4 INJECTION INTRAMUSCULAR; INTRAVENOUS
Status: DISCONTINUED | OUTPATIENT
Start: 2017-08-04 | End: 2017-08-07 | Stop reason: HOSPADM

## 2017-08-04 RX ORDER — MORPHINE SULFATE 2 MG/ML
2 INJECTION, SOLUTION INTRAMUSCULAR; INTRAVENOUS
Status: DISCONTINUED | OUTPATIENT
Start: 2017-08-04 | End: 2017-08-07 | Stop reason: HOSPADM

## 2017-08-04 RX ORDER — ARFORMOTEROL TARTRATE 15 UG/2ML
15 SOLUTION RESPIRATORY (INHALATION)
Status: DISCONTINUED | OUTPATIENT
Start: 2017-08-04 | End: 2017-08-07 | Stop reason: HOSPADM

## 2017-08-04 RX ORDER — FLUTICASONE PROPIONATE AND SALMETEROL 100; 50 UG/1; UG/1
1 POWDER RESPIRATORY (INHALATION) EVERY 12 HOURS
Status: DISCONTINUED | OUTPATIENT
Start: 2017-08-04 | End: 2017-08-04 | Stop reason: ALTCHOICE

## 2017-08-04 RX ORDER — DICYCLOMINE HYDROCHLORIDE 10 MG/1
10 CAPSULE ORAL 2 TIMES DAILY
Status: DISCONTINUED | OUTPATIENT
Start: 2017-08-04 | End: 2017-08-07 | Stop reason: HOSPADM

## 2017-08-04 RX ORDER — BUDESONIDE 0.5 MG/2ML
500 INHALANT ORAL
Status: DISCONTINUED | OUTPATIENT
Start: 2017-08-04 | End: 2017-08-07 | Stop reason: HOSPADM

## 2017-08-04 RX ADMIN — SODIUM CHLORIDE 75 ML/HR: 900 INJECTION, SOLUTION INTRAVENOUS at 14:15

## 2017-08-04 RX ADMIN — MORPHINE SULFATE 2 MG: 2 INJECTION, SOLUTION INTRAMUSCULAR; INTRAVENOUS at 06:28

## 2017-08-04 RX ADMIN — MORPHINE SULFATE 2 MG: 2 INJECTION, SOLUTION INTRAMUSCULAR; INTRAVENOUS at 21:33

## 2017-08-04 RX ADMIN — Medication 10 ML: at 01:40

## 2017-08-04 RX ADMIN — Medication 10 ML: at 21:36

## 2017-08-04 RX ADMIN — ONDANSETRON 4 MG: 2 INJECTION INTRAMUSCULAR; INTRAVENOUS at 20:07

## 2017-08-04 RX ADMIN — DICYCLOMINE HYDROCHLORIDE 10 MG: 10 CAPSULE ORAL at 09:07

## 2017-08-04 RX ADMIN — HYDROCHLOROTHIAZIDE 25 MG: 25 TABLET ORAL at 09:07

## 2017-08-04 RX ADMIN — ESCITALOPRAM OXALATE 10 MG: 10 TABLET ORAL at 09:07

## 2017-08-04 RX ADMIN — MORPHINE SULFATE 2 MG: 2 INJECTION, SOLUTION INTRAMUSCULAR; INTRAVENOUS at 01:57

## 2017-08-04 RX ADMIN — MORPHINE SULFATE 2 MG: 2 INJECTION, SOLUTION INTRAMUSCULAR; INTRAVENOUS at 17:43

## 2017-08-04 RX ADMIN — ACETAMINOPHEN 650 MG: 325 TABLET, FILM COATED ORAL at 18:48

## 2017-08-04 RX ADMIN — ESTROGENS, CONJUGATED 0.62 MG: 0.62 TABLET, FILM COATED ORAL at 09:07

## 2017-08-04 RX ADMIN — DICYCLOMINE HYDROCHLORIDE 10 MG: 10 CAPSULE ORAL at 17:14

## 2017-08-04 RX ADMIN — MORPHINE SULFATE 2 MG: 2 INJECTION, SOLUTION INTRAMUSCULAR; INTRAVENOUS at 10:25

## 2017-08-04 RX ADMIN — CLINDAMYCIN PHOSPHATE 600 MG: 12 INJECTION, SOLUTION INTRAMUSCULAR; INTRAVENOUS at 21:33

## 2017-08-04 RX ADMIN — CLINDAMYCIN PHOSPHATE 600 MG: 12 INJECTION, SOLUTION INTRAMUSCULAR; INTRAVENOUS at 09:08

## 2017-08-04 RX ADMIN — BUDESONIDE 500 MCG: 0.5 INHALANT RESPIRATORY (INHALATION) at 21:28

## 2017-08-04 RX ADMIN — Medication 10 ML: at 14:11

## 2017-08-04 RX ADMIN — ENOXAPARIN SODIUM 40 MG: 40 INJECTION SUBCUTANEOUS at 09:07

## 2017-08-04 RX ADMIN — MORPHINE SULFATE 2 MG: 2 INJECTION, SOLUTION INTRAMUSCULAR; INTRAVENOUS at 14:11

## 2017-08-04 RX ADMIN — ARFORMOTEROL TARTRATE 15 MCG: 15 SOLUTION RESPIRATORY (INHALATION) at 21:28

## 2017-08-04 RX ADMIN — SODIUM CHLORIDE 75 ML/HR: 900 INJECTION, SOLUTION INTRAVENOUS at 01:40

## 2017-08-04 NOTE — PROGRESS NOTES
Bedside shift change report given to Justin Medina RN (oncoming nurse) by [facility] RN (offgoing nurse). Report included the following information SBAR, Kardex, Intake/Output, MAR and Recent Results.

## 2017-08-04 NOTE — CONSULTS
Podiatry History and Physical    Subjective:         Date of Consultation:  August 4, 2017    Referring Physician: Dr. Annetta Boss MD    Patient is a 79 y.o.  female who is being seen for left foot cellulitis. Pt was admitted to [facility] for the same reason. Pt is known to me from my office. Pt states that 2 weeks ago, she fell off of her front porch and injured her feet and face. States that she lost some skin on her left 1st & 2nd toes. States she went back to work in regular shoes (high heels). States she started feeling very ill yesterday evening, which brought her into the hospital.  Presently, she denies any n/v/f/ns/c. Patient Active Problem List    Diagnosis Date Noted    Cellulitis of left lower extremity 08/03/2017    Foot abscess, left 08/04/2016    Hypokalemia 08/04/2016     Past Medical History:   Diagnosis Date    Asthma     Gastrointestinal disorder     diverticulitis    Ovarian cyst       History reviewed. No pertinent family history. Social History   Substance Use Topics    Smoking status: Never Smoker    Smokeless tobacco: Never Used    Alcohol use Yes      Comment: once per 6 months at social event     Past Surgical History:   Procedure Laterality Date    HX APPENDECTOMY      HX CHOLECYSTECTOMY      HX HYSTERECTOMY      partial, has ovaries    HX ORTHOPAEDIC      tennis elbow      Prior to Admission medications    Medication Sig Start Date End Date Taking? Authorizing Provider   conjugated estrogens (PREMARIN) 0.625 mg tablet Take 0.625 mg by mouth daily. Yes Historical Provider   escitalopram oxalate (LEXAPRO) 10 mg tablet Take 10 mg by mouth daily. Yes Historical Provider   albuterol (PROVENTIL HFA, VENTOLIN HFA, PROAIR HFA) 90 mcg/actuation inhaler Take 1 Puff by inhalation every four (4) hours as needed for Wheezing. Yes Historical Provider   hydrochlorothiazide (HYDRODIURIL) 25 mg tablet Take 25 mg by mouth daily.    Yes Historical Provider   dicyclomine (BENTYL) 10 mg capsule Take 10 mg by mouth two (2) times a day. Yes Historical Provider   fluticasone-salmeterol (ADVAIR DISKUS) 100-50 mcg/dose diskus inhaler Take 1 Puff by inhalation every twelve (12) hours. Yes Phys Other, MD     Allergies   Allergen Reactions    Garlic Hives    Penicillin G Hives        Review of Systems:  A comprehensive review of systems was negative except for that written in the HPI. Objective:     Patient Vitals for the past 8 hrs:   BP Temp Pulse Resp SpO2   17 0901 (!) 119/96 97.9 °F (36.6 °C) 70 18 95 %     Temp (24hrs), Av.3 °F (36.8 °C), Min:97.9 °F (36.6 °C), Max:98.5 °F (36.9 °C)    Left Foot Exam: (refer to below image): +edema and erythema localized to left forefoot & midfoot. +superficial wounds noted along tip of left hallux and along lateral aspect of 2nd toe. Wounds covered with soft eschar. DP/PT palpable. +pain on palpation along left forefoot. Data Review:   Recent Results (from the past 24 hour(s))   CBC WITH AUTOMATED DIFF    Collection Time: 17  8:58 PM   Result Value Ref Range    WBC 11.4 (H) 3.6 - 11.0 K/uL    RBC 4.07 3.80 - 5.20 M/uL    HGB 11.7 11.5 - 16.0 g/dL    HCT 36.1 35.0 - 47.0 %    MCV 88.7 80.0 - 99.0 FL    MCH 28.7 26.0 - 34.0 PG    MCHC 32.4 30.0 - 36.5 g/dL    RDW 13.2 11.5 - 14.5 %    PLATELET 995 918 - 399 K/uL    NEUTROPHILS 81 (H) 32 - 75 %    LYMPHOCYTES 10 (L) 12 - 49 %    MONOCYTES 6 5 - 13 %    EOSINOPHILS 3 0 - 7 %    BASOPHILS 0 0 - 1 %    ABS. NEUTROPHILS 9.2 (H) 1.8 - 8.0 K/UL    ABS. LYMPHOCYTES 1.1 0.8 - 3.5 K/UL    ABS. MONOCYTES 0.7 0.0 - 1.0 K/UL    ABS. EOSINOPHILS 0.3 0.0 - 0.4 K/UL    ABS.  BASOPHILS 0.0 0.0 - 0.1 K/UL   METABOLIC PANEL, COMPREHENSIVE    Collection Time: 17  8:58 PM   Result Value Ref Range    Sodium 135 (L) 136 - 145 mmol/L    Potassium 3.5 3.5 - 5.1 mmol/L    Chloride 98 97 - 108 mmol/L    CO2 29 21 - 32 mmol/L    Anion gap 8 5 - 15 mmol/L    Glucose 137 (H) 65 - 100 mg/dL BUN 24 (H) 6 - 20 MG/DL    Creatinine 0.86 0.55 - 1.02 MG/DL    BUN/Creatinine ratio 28 (H) 12 - 20      GFR est AA >60 >60 ml/min/1.73m2    GFR est non-AA >60 >60 ml/min/1.73m2    Calcium 9.0 8.5 - 10.1 MG/DL    Bilirubin, total 0.3 0.2 - 1.0 MG/DL    ALT (SGPT) 15 12 - 78 U/L    AST (SGOT) 12 (L) 15 - 37 U/L    Alk. phosphatase 78 45 - 117 U/L    Protein, total 7.8 6.4 - 8.2 g/dL    Albumin 3.7 3.5 - 5.0 g/dL    Globulin 4.1 (H) 2.0 - 4.0 g/dL    A-G Ratio 0.9 (L) 1.1 - 2.2     CULTURE, BLOOD    Collection Time: 08/03/17  8:58 PM   Result Value Ref Range    Special Requests: NO SPECIAL REQUESTS      Culture result: NO GROWTH AFTER 8 HOURS     LACTIC ACID    Collection Time: 08/03/17  9:52 PM   Result Value Ref Range    Lactic acid 2.0 0.4 - 2.0 MMOL/L   CULTURE, BLOOD    Collection Time: 08/03/17  9:52 PM   Result Value Ref Range    Special Requests: NO SPECIAL REQUESTS      Culture result: NO GROWTH AFTER 8 HOURS     METABOLIC PANEL, BASIC    Collection Time: 08/04/17  3:21 AM   Result Value Ref Range    Sodium 136 136 - 145 mmol/L    Potassium 3.4 (L) 3.5 - 5.1 mmol/L    Chloride 99 97 - 108 mmol/L    CO2 30 21 - 32 mmol/L    Anion gap 7 5 - 15 mmol/L    Glucose 134 (H) 65 - 100 mg/dL    BUN 21 (H) 6 - 20 MG/DL    Creatinine 0.75 0.55 - 1.02 MG/DL    BUN/Creatinine ratio 28 (H) 12 - 20      GFR est AA >60 >60 ml/min/1.73m2    GFR est non-AA >60 >60 ml/min/1.73m2    Calcium 8.2 (L) 8.5 - 10.1 MG/DL   CBC WITH AUTOMATED DIFF    Collection Time: 08/04/17  3:21 AM   Result Value Ref Range    WBC 7.5 3.6 - 11.0 K/uL    RBC 3.48 (L) 3.80 - 5.20 M/uL    HGB 9.8 (L) 11.5 - 16.0 g/dL    HCT 30.9 (L) 35.0 - 47.0 %    MCV 88.8 80.0 - 99.0 FL    MCH 28.2 26.0 - 34.0 PG    MCHC 31.7 30.0 - 36.5 g/dL    RDW 13.4 11.5 - 14.5 %    PLATELET 728 320 - 503 K/uL    NEUTROPHILS 69 32 - 75 %    LYMPHOCYTES 22 12 - 49 %    MONOCYTES 6 5 - 13 %    EOSINOPHILS 3 0 - 7 %    BASOPHILS 0 0 - 1 %    ABS.  NEUTROPHILS 5.2 1.8 - 8.0 K/UL ABS. LYMPHOCYTES 1.6 0.8 - 3.5 K/UL    ABS. MONOCYTES 0.4 0.0 - 1.0 K/UL    ABS. EOSINOPHILS 0.2 0.0 - 0.4 K/UL    ABS. BASOPHILS 0.0 0.0 - 0.1 K/UL         Impression:     1.) Left Foot Cellulitis with digital wounds  2.) Left Foot Contusion  3.) Left Foot Pain      Recommendation:   1.) Had long discussion with Pt. Clinically, it appears that her recent fall caused her to develop wounds along the 1st & 2nd toes on her left foot. These wounds appear to be superficial, but going back to work in a regular shoe irritated these wounds and let to a soft tissue cellulitis. 2.) Continue antibxs (clinda). WBC 7.5. Recommend adding IV cipro for gram negative coverage. XR negative for fracture or gas. 3.) No surgical intervention needed from my standpoint. Pt needs 2-3 days of IV antibxs followed by a 10-14 day course of po antibxs upon discharge. Pt has a surgical boot at home - instructed her to go into that once she is discharged.     4.) Will follow

## 2017-08-04 NOTE — H&P
History and Physical    Patient: Ilana Goncalves               Sex: female          DOA: 8/3/2017       YOB: 1950      Age:  79 y.o.        LOS:  LOS: 0 days        HPI:     Ilana Goncalves is a 79 y.o. female who presents to ED complaining of left foot pain. patient states she fell off her front porch, steps concrete. She hit and drug both feet and forehead and nose. All wounds appeared to heal,   The left 1st and 2nd toe was healing slowly. She reports pain throughout the work day. She got home and took 2 aspirin and went to sleep. When she woke up, she was nauseous and diaphoretic. Her foot felt tight and she had pain going up her leg. She decided to seek medical attention. While in the ED, patient underwent xray, ultrasound of her foot. IV antibiotics initiated. Ultrasound result pending. Patient admitted under hospitalist service for further management. Past Medical History:   Diagnosis Date    Asthma     Gastrointestinal disorder     diverticulitis    Ovarian cyst        Past Surgical History:   Procedure Laterality Date    HX APPENDECTOMY      HX CHOLECYSTECTOMY      HX HYSTERECTOMY      partial, has ovaries    HX ORTHOPAEDIC      tennis elbow       Social History     Social History    Marital status:      Spouse name: N/A    Number of children: N/A    Years of education: N/A     Occupational History    Not on file. Social History Main Topics    Smoking status: Never Smoker    Smokeless tobacco: Never Used    Alcohol use Yes      Comment: once per 6 months at social event    Drug use: No    Sexual activity: Not on file     Other Topics Concern    Not on file     Social History Narrative       History reviewed. No pertinent family history. Allergies   Allergen Reactions    Garlic Hives    Penicillin G Hives       Review of Systems:  Positive in bold.   CONST: Fever, weight loss, fatigue or chills   GI: Nausea, vomiting, abdominal pain, change in bowel habits, hematochezia, melena, and GERD   INTEG: Dermatitis, abnormal moles  HEENT: Recent changes in vision, vertigo, epistaxis, dysphagia, hoarseness  CV: Chest pain, palpitations, HTN, edema and varicosities  RESP: Cough, shortness of breath, wheezing, hemoptysis, snoring. : Hematuria, dysuria, frequency, urgency, retention, incontinence   MS: Weakness, joint pain and arthritis  ENDO: Diabetes, thyroid disease, polyuria, polydipsia, polyphagia, poor wound healing, heat intolerance, cold intolerance  LYMPH/HEME: Anemia, bruising and history of blood transfusions  NEURO: Dizziness, headache, fainting, seizures and stroke  PSYCH: Anxiety , depression    Physical Exam:      Visit Vitals    /53 (BP 1 Location: Right arm, BP Patient Position: At rest)    Pulse 78    Temp 98.3 °F (36.8 °C)    Resp 18    Ht 5' 6\" (1.676 m)    Wt 103.9 kg (229 lb)    SpO2 93%    BMI 36.96 kg/m2       Physical Exam:  Gen:  No distress, alert, awake and oriented x 4  HEENT:  Normal cephalic atraumatic, extra-occular movements are intact. Neck:  Supple, No JVD  Lungs:  Clear bilaterally, no wheeze, no rales, normal effort  Cardiovascular:  Regular Rate and Rhythm, normal S1 and S2, no audible murmur. Capillary refill: < 3 seconds. Abdomen:  Soft, non tender, non distended, normal bowel sounds, no guarding. Extremities:  Well perfused, no cyanosis, left lower extremity edema, warm, red. Scabs on 1st and 2nd toe with purulent discharge. Peripheral pulse:2+  Neurological:  Awake and alert, CN's are intact, normal strength throughout extremities  Mental Status:  Normal thought process, does not appear anxious      Laboratory Studies:        Assessment/Plan     Active Problems:    Cellulitis of left lower extremity (8/3/2017)        PLAN:    Infectious: left foot cellulitis with purulent discharge.    Continue IV antibiotics- clindamycin   Repeat lactic acid levels every 4 hours until normal   Wound culture   F/u ultrasound   Consider MRI foot     Heme:  DVT prophylaxis   Lovenox 40 mg SQ daily    GI: Nausea   Zofran as needed. Keep NPO    Nephro: Renal injury   Repeat chemistry in am   Continuous IV Fluid    Misc:  FULL CODE   Ambulate with assistance. Monitor intake and output   Monitor vitals as per unit   Replace electrolytes as needed. Follow up am labs.           Ortega Corbin MD

## 2017-08-04 NOTE — PROGRESS NOTES
Bedside and Verbal shift change report given to Jerry Leavitt RN (oncoming nurse) by Ariana Calhoun RN (offgoing nurse). Report included the following information SBAR, Kardex, MAR and Recent Results.

## 2017-08-04 NOTE — PROGRESS NOTES
Reviewed medical chart; met with the patient at the bedside. Note that she is being seen for cellulitis of left lower extremity. Patient lives alone. She does not use any DME. She does not have an official PCP, but utilizes the [facility] in [place]. She has a podiatrist, Dr. Jovanny Hanson, who she sees regularly. Patient's sister will drive from [facility] to pick her up from the hospital.  Care Management will continue to follow her disposition. BRADEN Cazares    Care Management Interventions  PCP Verified by CM:  Yes  Palliative Care Consult (Criteria: CHF and RRAT>21): No  Mode of Transport at Discharge:  (Patient's sister will drive her home.  )  MyChart Signup: No  Discharge Durable Medical Equipment: No  Physical Therapy Consult: No  Occupational Therapy Consult: No  Speech Therapy Consult: No  Current Support Network: Lives Alone  Confirm Follow Up Transport:  (Patient's sister will drive her home.  )  Plan discussed with Pt/Family/Caregiver: Yes  Freedom of Choice Offered: Yes  Discharge Location  Discharge Placement: Home

## 2017-08-04 NOTE — PROGRESS NOTES
Hospitalist Progress Note  Roma Griffith MD  Office: [phone]        Date of Service:  2017  NAME:  Gabriele Major  :  1950  MRN:  [id number]      Admission Summary:   From H&P:Jovon Thao is a 79 y.o. female who presents to ED complaining of left foot pain. patient states she fell off her front porch, steps concrete. She hit and drug both feet and forehead and nose. All wounds appeared to heal,   The left 1st and 2nd toe was healing slowly. She reports pain throughout the work day. She got home and took 2 aspirin and went to sleep. When she woke up, she was nauseous and diaphoretic. Her foot felt tight and she had pain going up her leg. She decided to seek medical attention. Past Medical History:   Diagnosis Date    Asthma     Gastrointestinal disorder     diverticulitis    Ovarian cyst        Interval history / Subjective:     Left leg warm, pain better. Assessment & Plan:   Left foot cellulitis with purulent discharge,superficial ulcer on the 2nd toe  -Continue IV clindamycin  -Xray ,no FB. US +edema  -Wound culture pending.  -Consult Podiatry    HTN: controlled with HCTZ  Mild hypokalemia: replace  Obese  Body mass index is 36.96 kg/(m^2). -Diet and activity counseling     Asthma,stable: nebs      Diet:cardiac  Code status: full  DVT prophylaxis: lovenox  Care Plan discussed with: patient,RN,IDR team    Discharge planning/disposition:home in 2-3 days    Hospital Problems  Date Reviewed: 2016          Codes Class Noted POA    Cellulitis of left lower extremity ICD-10-CM: L03.116  ICD-9-CM: 682.6  8/3/2017 Unknown                Review of Systems:   A comprehensive review of systems was negative except for that written in the HPI. Physical Examination:      Last 24hrs VS reviewed since prior progress note. Most recent are:  Visit Vitals    BP (!) 119/96 (BP 1 Location: Right arm, BP Patient Position:  At rest)    Pulse 70    Temp 97.9 °F (36.6 °C)    Resp 18    Ht 5' 6\" (1.676 m)    Wt 103.9 kg (229 lb)    SpO2 95%    BMI 36.96 kg/m2           Constitutional:  No acute distress, cooperative, pleasant    Eyes: Non icteric,non pallor,no erythema,discharge,PERRLA   ENT:  Oral mucous moist, oropharynx benign. Neck supple,    Resp:  CTA bilaterally. No wheezing/rhonchi/rales. No accessory muscle use   CV:  Regular rhythm, normal rate, no murmurs, gallops, rubs    GI:  Soft, non distended, non tender. normoactive bowel sounds, no hepatosplenomegaly    :  No CVA or suprapubic tenderness   Skin  :  No erythema,rash,bullae,dipigmentation     Musculoskeletal:  Left foot swollen,warm and tender,superficial wound on the 2nd toe. Neurologic:  AAOx3, CN II-XII reviewed. Moves all extremities. Psych:  Good insight, Not anxious nor agitated. Intake/Output Summary (Last 24 hours) at 08/04/17 1040  Last data filed at 08/04/17 1517   Gross per 24 hour   Intake                0 ml   Output              300 ml   Net             -300 ml          Data Review:    Review and/or order of clinical lab test  Review and/or order of tests in the radiology section of CPT  Review and/or order of tests in the medicine section of CPT      Labs:     Recent Labs      08/04/17 0321 08/03/17 2058   WBC  7.5  11.4*   HGB  9.8*  11.7   HCT  30.9*  36.1   PLT  264  283     Recent Labs      08/04/17 0321 08/03/17 2058   NA  136  135*   K  3.4*  3.5   CL  99  98   CO2  30  29   BUN  21*  24*   CREA  0.75  0.86   GLU  134*  137*   CA  8.2*  9.0     Recent Labs      08/03/17 2058   SGOT  12*   ALT  15   AP  78   TBILI  0.3   TP  7.8   ALB  3.7   GLOB  4.1*     No results for input(s): INR, PTP, APTT in the last 72 hours. No lab exists for component: INREXT   No results for input(s): FE, TIBC, PSAT, FERR in the last 72 hours. No results found for: FOL, RBCF   No results for input(s): PH, PCO2, PO2 in the last 72 hours.   No results for input(s): CPK, CKNDX, TROIQ in the last 72 hours.     No lab exists for component: CPKMB  No results found for: CHOL, CHOLX, CHLST, CHOLV, HDL, LDL, LDLC, DLDLP, TGLX, TRIGL, TRIGP, CHHD, CHHDX  Lab Results   Component Value Date/Time    Glucose (POC) 104 08/08/2016 11:17 AM    Glucose (POC) 120 08/08/2016 06:06 AM    Glucose (POC) 128 08/07/2016 09:01 PM    Glucose (POC) 105 08/07/2016 04:48 PM    Glucose (POC) 96 08/07/2016 12:11 PM     No results found for: COLOR, APPRN, SPGRU, REFSG, RAYA, PROTU, GLUCU, KETU, BILU, UROU, YEISON, LEUKU, GLUKE, EPSU, BACTU, WBCU, RBCU, CASTS, UCRY      Medications Reviewed:     Current Facility-Administered Medications   Medication Dose Route Frequency    albuterol (PROVENTIL VENTOLIN) nebulizer solution 2.5 mg  2.5 mg Nebulization Q4H PRN    conjugated estrogens (PREMARIN) tablet 0.625 mg  0.625 mg Oral DAILY    hydroCHLOROthiazide (HYDRODIURIL) tablet 25 mg  25 mg Oral DAILY    dicyclomine (BENTYL) capsule 10 mg  10 mg Oral BID    escitalopram oxalate (LEXAPRO) tablet 10 mg  10 mg Oral DAILY    arformoterol (BROVANA) neb solution 15 mcg  15 mcg Nebulization BID RT    And    budesonide (PULMICORT) 500 mcg/2 ml nebulizer suspension  500 mcg Nebulization BID RT    morphine injection 2 mg  2 mg IntraVENous Q4H PRN    sodium chloride (NS) flush 5-10 mL  5-10 mL IntraVENous Q8H    sodium chloride (NS) flush 5-10 mL  5-10 mL IntraVENous PRN    enoxaparin (LOVENOX) injection 40 mg  40 mg SubCUTAneous Q24H    0.9% sodium chloride infusion  75 mL/hr IntraVENous CONTINUOUS    clindamycin (CLEOCIN) 600mg D5W 50mL IVPB (premix)  600 mg IntraVENous Q12H    acetaminophen (TYLENOL) tablet 650 mg  650 mg Oral Q6H PRN     ______________________________________________________________________  EXPECTED LENGTH OF STAY: - - -  ACTUAL LENGTH OF STAY:          1                 Ethel Paez MD

## 2017-08-04 NOTE — ED TRIAGE NOTES
TRIAGE NOTE: Pt fell two weeks ago, treated and discharged. Pt appreciated increased pain, swelling, redness, and drainage to her left foot. Wound on 2nd toe. Pt reports chills, afebrile in triage.

## 2017-08-04 NOTE — ED PROVIDER NOTES
HPI Comments: 79 y.o. female with past medical history significant for diverticulitis and asthma who presents from home with chief complaint of toe pain. Patient fell two weeks ago resulting in scrapes to her toes on both feet. The scrapes on her right foot healed well but the scrape to her left second toe is still scabbed. She noticed increased pain to the wound today, so she took aspirin and fell asleep. When she woke up she notes \"burning\" in her left lower leg, redness, and more pain to her toe. She admits to chills and nausea. She denies fever, chest pain, shortness of breath, lightheaded, dizzy, abdominal pain, or changes in urine or bowels. There are no other acute medical concerns at this time. Social hx: Never smoker. Occasional alcohol use. No illicit drug use. PCP: Sophy Coronel MD    Note written by Meenakshi Iglesias, as dictated by Geetha Gee MD 9:34 PM      The history is provided by the patient. Past Medical History:   Diagnosis Date    Asthma     Gastrointestinal disorder     diverticulitis    Ovarian cyst        Past Surgical History:   Procedure Laterality Date    HX APPENDECTOMY      HX CHOLECYSTECTOMY      HX HYSTERECTOMY      partial, has ovaries    HX ORTHOPAEDIC      tennis elbow         History reviewed. No pertinent family history. Social History     Social History    Marital status:      Spouse name: N/A    Number of children: N/A    Years of education: N/A     Occupational History    Not on file. Social History Main Topics    Smoking status: Never Smoker    Smokeless tobacco: Never Used    Alcohol use Yes      Comment: once per 6 months at social event    Drug use: No    Sexual activity: Not on file     Other Topics Concern    Not on file     Social History Narrative         ALLERGIES: Garlic and Penicillin g    Review of Systems   Constitutional: Positive for chills. Negative for fever. HENT: Negative for rhinorrhea and sore throat. Eyes: Negative for pain and redness. Respiratory: Negative for cough and shortness of breath. Cardiovascular: Negative for chest pain and leg swelling. Gastrointestinal: Positive for nausea. Negative for abdominal pain, diarrhea and vomiting. Genitourinary: Negative for dysuria, flank pain and hematuria. Musculoskeletal: Negative for back pain and neck pain. Skin: Positive for color change (redness to left leg) and wound (to left second toe). All other systems reviewed and are negative. Vitals:    08/03/17 2049   BP: 146/65   Pulse: 88   Resp: 18   Temp: 98.5 °F (36.9 °C)   SpO2: 96%   Weight: 103.9 kg (229 lb)   Height: 5' 6\" (1.676 m)            Physical Exam     Const:  No acute distress, well developed, well nourished  Head:  Atraumatic, normocephalic  Eyes:  PERRL, conjunctiva normal, no scleral icterus  Neck:  Supple, trachea midline  Cardiovascular:  RRR, no murmurs, no gallops, no rubs  Resp:  No resp distress, no increased work of breathing, no wheezes, no rhonchi, no rales,  Abd:  Soft, non-tender, non-distended, no rebound, no guarding, no CVA tenderness  :  Deferred  MSK:  No pedal edema, normal ROM  Neuro:  Alert and oriented x3, no cranial nerve defect  Skin:  Ulcer at the dorsal aspect of the left second toe with erythema stretching circumferentially to the calf with swelling to the left food and leg. Psych: normal mood and affect, behavior is normal, judgement and thought content is normal    Note written by Meenakshi Eduardo, as dictated by Tiffanie Mckeon MD 9:34 PM    MDM  Number of Diagnoses or Management Options  Cellulitis of left lower extremity:      Amount and/or Complexity of Data Reviewed  Clinical lab tests: ordered and reviewed  Tests in the radiology section of CPT®: ordered and reviewed  Review and summarize past medical records: yes    Patient Progress  Patient progress: stable    ED Course     Pt. Presents to the ER with toe pain and swelling. Pt.  With erythema and signs of infection. The erythema extends up the leg to the mid/upper calf. No signs of osteo on xray. I have started him on xray. Pt. To be evaluated for admission by the hospitalist.      Procedures    CONSULT NOTE:  10:34 PM Queta Lowery MD spoke with Dr. Luz John, Consult for Hospitalist.  Discussed available diagnostic tests and clinical findings. He is in agreement with care plans as outlined. Dr. Jose F Parker will see and admit the patient.

## 2017-08-04 NOTE — PROGRESS NOTES
Bedside shift change report given to [address] [place] [address] Avenue (oncoming nurse) by Raquel Chairez (offgoing nurse). Report included the following information SBAR, Kardex, Intake/Output, MAR and Recent Results.

## 2017-08-04 NOTE — ROUTINE PROCESS
TRANSFER - OUT REPORT:    Verbal report given to Frida Kessler (name) on [facility]  being transferred to [address] [place] [address] (unit) for routine progression of care       Report consisted of patients Situation, Background, Assessment and   Recommendations(SBAR). Information from the following report(s) SBAR, ED Summary, [facility] and Recent Results was reviewed with the receiving nurse. Lines:   Peripheral IV 08/03/17 Right Hand (Active)   Site Assessment Clean, dry, & intact 8/3/2017  9:00 PM   Phlebitis Assessment 0 8/3/2017  9:00 PM   Infiltration Assessment 0 8/3/2017  9:00 PM   Dressing Status Clean, dry, & intact 8/3/2017  9:00 PM   Dressing Type Tape;Transparent 8/3/2017  9:00 PM   Hub Color/Line Status Pink;Capped;Flushed;Patent 8/3/2017  9:00 PM   Action Taken Blood drawn 8/3/2017  9:00 PM        Opportunity for questions and clarification was provided.       Patient transported with:   [employer]

## 2017-08-04 NOTE — ED NOTES
Bedside and Verbal shift change report given to Nava Yeboah RN (oncoming nurse) by Loreta Chun RN (offgoing nurse). Report given with SBAR, Kardex, Intake/Output, MAR and Recent Results.

## 2017-08-04 NOTE — PROGRESS NOTES
Admission Medication Reconciliation:    Information obtained from: patient     Significant PMH/Disease States:   Past Medical History:   Diagnosis Date    Asthma     Gastrointestinal disorder     diverticulitis    Ovarian cyst        Chief Complaint for this Admission:  toe wound     Allergies:  Garlic and Penicillin g    Prior to Admission Medications:   Prior to Admission Medications   Prescriptions Last Dose Informant Patient Reported? Taking? albuterol (PROVENTIL HFA, VENTOLIN HFA, PROAIR HFA) 90 mcg/actuation inhaler   Yes Yes   Sig: Take 1 Puff by inhalation every four (4) hours as needed for Wheezing. conjugated estrogens (PREMARIN) 0.625 mg tablet 8/3/2017 at am  Yes Yes   Sig: Take 0.625 mg by mouth daily. dicyclomine (BENTYL) 10 mg capsule 8/3/2017 at am  Yes Yes   Sig: Take 10 mg by mouth two (2) times a day. escitalopram oxalate (LEXAPRO) 10 mg tablet 8/3/2017 at am  Yes Yes   Sig: Take 10 mg by mouth daily. fluticasone-salmeterol (ADVAIR DISKUS) 100-50 mcg/dose diskus inhaler 8/3/2017 at am  Yes Yes   Sig: Take 1 Puff by inhalation every twelve (12) hours. hydrochlorothiazide (HYDRODIURIL) 25 mg tablet 8/3/2017 at am  Yes Yes   Sig: Take 25 mg by mouth daily. Facility-Administered Medications: None         Comments/Recommendations: This medication history was obtained from the patient; (s)he appears to be an accurate historian and she brought her medication bottles to the hospital.  [facility] is available for most of her medications. Inpatient orders were reviewed and no changes are needed. Medications added: none  Medications deleted: erythromycin, gabapentin, hydrocodone-APAP, metformin     Thank you for allowing me to participate in the care of this patient. Please contact the pharmacy () or the medication reconciliation pharmacy () with any questions. Dm Horton., BCPS, BCPPS

## 2017-08-05 PROCEDURE — 74011250636 HC RX REV CODE- 250/636: Performed by: HOSPITALIST

## 2017-08-05 PROCEDURE — 74011250637 HC RX REV CODE- 250/637: Performed by: HOSPITALIST

## 2017-08-05 PROCEDURE — 77010033678 HC OXYGEN DAILY

## 2017-08-05 PROCEDURE — 94640 AIRWAY INHALATION TREATMENT: CPT

## 2017-08-05 PROCEDURE — 65270000032 HC RM SEMIPRIVATE

## 2017-08-05 PROCEDURE — 74011250636 HC RX REV CODE- 250/636: Performed by: NURSE PRACTITIONER

## 2017-08-05 PROCEDURE — 74011000250 HC RX REV CODE- 250: Performed by: HOSPITALIST

## 2017-08-05 RX ORDER — LEVOFLOXACIN 5 MG/ML
500 INJECTION, SOLUTION INTRAVENOUS EVERY 24 HOURS
Status: DISCONTINUED | OUTPATIENT
Start: 2017-08-05 | End: 2017-08-07 | Stop reason: HOSPADM

## 2017-08-05 RX ADMIN — ENOXAPARIN SODIUM 40 MG: 40 INJECTION SUBCUTANEOUS at 08:53

## 2017-08-05 RX ADMIN — Medication 10 ML: at 21:33

## 2017-08-05 RX ADMIN — ESCITALOPRAM OXALATE 10 MG: 10 TABLET ORAL at 08:52

## 2017-08-05 RX ADMIN — CLINDAMYCIN PHOSPHATE 600 MG: 12 INJECTION, SOLUTION INTRAMUSCULAR; INTRAVENOUS at 21:33

## 2017-08-05 RX ADMIN — HYDROCHLOROTHIAZIDE 25 MG: 25 TABLET ORAL at 08:52

## 2017-08-05 RX ADMIN — MORPHINE SULFATE 2 MG: 2 INJECTION, SOLUTION INTRAMUSCULAR; INTRAVENOUS at 23:15

## 2017-08-05 RX ADMIN — CLINDAMYCIN PHOSPHATE 600 MG: 12 INJECTION, SOLUTION INTRAMUSCULAR; INTRAVENOUS at 10:34

## 2017-08-05 RX ADMIN — DICYCLOMINE HYDROCHLORIDE 10 MG: 10 CAPSULE ORAL at 17:01

## 2017-08-05 RX ADMIN — MORPHINE SULFATE 2 MG: 2 INJECTION, SOLUTION INTRAMUSCULAR; INTRAVENOUS at 11:32

## 2017-08-05 RX ADMIN — ACETAMINOPHEN 650 MG: 325 TABLET, FILM COATED ORAL at 13:02

## 2017-08-05 RX ADMIN — ESTROGENS, CONJUGATED 0.62 MG: 0.62 TABLET, FILM COATED ORAL at 08:52

## 2017-08-05 RX ADMIN — MORPHINE SULFATE 2 MG: 2 INJECTION, SOLUTION INTRAMUSCULAR; INTRAVENOUS at 15:32

## 2017-08-05 RX ADMIN — ONDANSETRON 4 MG: 2 INJECTION INTRAMUSCULAR; INTRAVENOUS at 23:15

## 2017-08-05 RX ADMIN — Medication 10 ML: at 06:43

## 2017-08-05 RX ADMIN — SODIUM CHLORIDE 75 ML/HR: 900 INJECTION, SOLUTION INTRAVENOUS at 07:18

## 2017-08-05 RX ADMIN — MORPHINE SULFATE 2 MG: 2 INJECTION, SOLUTION INTRAMUSCULAR; INTRAVENOUS at 19:30

## 2017-08-05 RX ADMIN — MORPHINE SULFATE 2 MG: 2 INJECTION, SOLUTION INTRAMUSCULAR; INTRAVENOUS at 07:15

## 2017-08-05 RX ADMIN — LEVOFLOXACIN 500 MG: 5 INJECTION, SOLUTION INTRAVENOUS at 15:25

## 2017-08-05 RX ADMIN — ARFORMOTEROL TARTRATE 15 MCG: 15 SOLUTION RESPIRATORY (INHALATION) at 19:44

## 2017-08-05 RX ADMIN — BUDESONIDE 500 MCG: 0.5 INHALANT RESPIRATORY (INHALATION) at 19:44

## 2017-08-05 RX ADMIN — SODIUM CHLORIDE 75 ML/HR: 900 INJECTION, SOLUTION INTRAVENOUS at 20:38

## 2017-08-05 RX ADMIN — DICYCLOMINE HYDROCHLORIDE 10 MG: 10 CAPSULE ORAL at 08:53

## 2017-08-05 RX ADMIN — Medication 10 ML: at 13:04

## 2017-08-05 NOTE — PROGRESS NOTES
Hospitalist Progress Note  Xiomara Jordan MD  Office: [phone]        Date of Service:  2017  NAME:  Robert Cobb  :  1950  MRN:  [id number]      Admission Summary:   From H&P:Jovon Rachel is a 79 y.o. female who presents to ED complaining of left foot pain. patient states she fell off her front porch, steps concrete. She hit and drug both feet and forehead and nose. All wounds appeared to heal,   The left 1st and 2nd toe was healing slowly. She reports pain throughout the work day. She got home and took 2 aspirin and went to sleep. When she woke up, she was nauseous and diaphoretic. Her foot felt tight and she had pain going up her leg. She decided to seek medical attention. Past Medical History:   Diagnosis Date    Asthma     Gastrointestinal disorder     diverticulitis    Ovarian cyst        Interval history / Subjective:     Left foot swelling and erythema better. Assessment & Plan:   Left foot cellulitis with purulent discharge,superficial ulcer on the 2nd toe  -Continue IV clindamycin  -Xray ,no FB. US +edema  -Wound culture light possible staph,c/s pending. .  -Consult Podiatry,recommending adding cipro for gram negative coverage    HTN: controlled with HCTZ  Mild hypokalemia: replace  Obese  Body mass index is 36.96 kg/(m^2). -Diet and activity counseling     Asthma,stable: nebs      Diet:cardiac  Code status: full  DVT prophylaxis: lovenox  Care Plan discussed with: patient,RN,IDR team    Discharge planning/disposition:home in 2-3 days    Hospital Problems  Date Reviewed: 2016          Codes Class Noted POA    Cellulitis of left lower extremity ICD-10-CM: L03.116  ICD-9-CM: 682.6  8/3/2017 Unknown                Review of Systems:   A comprehensive review of systems was negative except for that written in the HPI. Physical Examination:      Last 24hrs VS reviewed since prior progress note.  Most recent are:  Visit Vitals    /67 (BP 1 Location: Right arm)    Pulse 75    Temp 99.2 °F (37.3 °C)    Resp 16    Ht 5' 6\" (1.676 m)    Wt 103.9 kg (229 lb)    SpO2 91%    BMI 36.96 kg/m2           Constitutional:  No acute distress, cooperative, pleasant    Eyes: Non icteric,non pallor,no erythema,discharge,PERRLA   ENT:  Oral mucous moist, oropharynx benign. Neck supple,    Resp:  CTA bilaterally. No wheezing/rhonchi/rales. No accessory muscle use   CV:  Regular rhythm, normal rate, no murmurs, gallops, rubs    GI:  Soft, non distended, non tender. normoactive bowel sounds, no hepatosplenomegaly    :  No CVA or suprapubic tenderness   Skin  :  No erythema,rash,bullae,dipigmentation     Musculoskeletal:  Left foot less swollen ,superficial wound on the 2nd toe. Neurologic:  AAOx3, CN II-XII reviewed. Moves all extremities. Psych:  Good insight, Not anxious nor agitated. Intake/Output Summary (Last 24 hours) at 08/05/17 1403  Last data filed at 08/04/17 1415   Gross per 24 hour   Intake             1199 ml   Output                0 ml   Net             1199 ml          Data Review:    Review and/or order of clinical lab test  Review and/or order of tests in the radiology section of CPT  Review and/or order of tests in the medicine section of CPT      Labs:     Recent Labs      08/04/17 0321 08/03/17 2058   WBC  7.5  11.4*   HGB  9.8*  11.7   HCT  30.9*  36.1   PLT  264  283     Recent Labs      08/04/17 0321 08/03/17 2058   NA  136  135*   K  3.4*  3.5   CL  99  98   CO2  30  29   BUN  21*  24*   CREA  0.75  0.86   GLU  134*  137*   CA  8.2*  9.0     Recent Labs      08/03/17 2058   SGOT  12*   ALT  15   AP  78   TBILI  0.3   TP  7.8   ALB  3.7   GLOB  4.1*     No results for input(s): INR, PTP, APTT in the last 72 hours. No lab exists for component: INREXT, INREXT   No results for input(s): FE, TIBC, PSAT, FERR in the last 72 hours.    No results found for: FOL, RBCF   No results for input(s): PH, PCO2, PO2 in the last 72 hours. No results for input(s): CPK, CKNDX, TROIQ in the last 72 hours.     No lab exists for component: CPKMB  No results found for: CHOL, CHOLX, CHLST, CHOLV, HDL, LDL, LDLC, DLDLP, TGLX, TRIGL, TRIGP, CHHD, CHHDX  Lab Results   Component Value Date/Time    Glucose (POC) 104 08/08/2016 11:17 AM    Glucose (POC) 120 08/08/2016 06:06 AM    Glucose (POC) 128 08/07/2016 09:01 PM    Glucose (POC) 105 08/07/2016 04:48 PM    Glucose (POC) 96 08/07/2016 12:11 PM     No results found for: COLOR, APPRN, SPGRU, REFSG, RAYA, PROTU, GLUCU, KETU, BILU, UROU, YEISON, LEUKU, GLUKE, EPSU, BACTU, WBCU, RBCU, CASTS, UCRY      Medications Reviewed:     Current Facility-Administered Medications   Medication Dose Route Frequency    albuterol (PROVENTIL VENTOLIN) nebulizer solution 2.5 mg  2.5 mg Nebulization Q4H PRN    conjugated estrogens (PREMARIN) tablet 0.625 mg  0.625 mg Oral DAILY    hydroCHLOROthiazide (HYDRODIURIL) tablet 25 mg  25 mg Oral DAILY    dicyclomine (BENTYL) capsule 10 mg  10 mg Oral BID    escitalopram oxalate (LEXAPRO) tablet 10 mg  10 mg Oral DAILY    arformoterol (BROVANA) neb solution 15 mcg  15 mcg Nebulization BID RT    And    budesonide (PULMICORT) 500 mcg/2 ml nebulizer suspension  500 mcg Nebulization BID RT    morphine injection 2 mg  2 mg IntraVENous Q4H PRN    ondansetron (ZOFRAN) injection 4 mg  4 mg IntraVENous Q4H PRN    sodium chloride (NS) flush 5-10 mL  5-10 mL IntraVENous Q8H    sodium chloride (NS) flush 5-10 mL  5-10 mL IntraVENous PRN    enoxaparin (LOVENOX) injection 40 mg  40 mg SubCUTAneous Q24H    0.9% sodium chloride infusion  75 mL/hr IntraVENous CONTINUOUS    clindamycin (CLEOCIN) 600mg D5W 50mL IVPB (premix)  600 mg IntraVENous Q12H    acetaminophen (TYLENOL) tablet 650 mg  650 mg Oral Q6H PRN     ______________________________________________________________________  EXPECTED LENGTH OF STAY: 3d 9h  ACTUAL LENGTH OF STAY: 2                 Jl Che MD

## 2017-08-05 NOTE — PROGRESS NOTES
Podiatry  -Pt seen & examined at bedside. Denies any n/v/f/ns/c. States she is feeling better compared to yesterday.  -Left foot erythema and edema improving. 1st & 2nd toe wounds dry with soft eschar.   -Continue IV antibxs (clinda/levaquin). Wound cx = staph aureus.   -Will reassess on Monday. If doing even better at that time, should be ok to d/c to home on 10-14 days of po antibxs.     -Will follow

## 2017-08-05 NOTE — PROGRESS NOTES
Bedside shift change report given to Singh Khalil RN (oncoming nurse) by Rinku Yuan RN (offgoing nurse). Report included the following information SBAR, Kardex, Intake/Output, MAR and Recent Results.

## 2017-08-06 LAB
ANION GAP BLD CALC-SCNC: 5 MMOL/L (ref 5–15)
BACTERIA SPEC CULT: ABNORMAL
BUN SERPL-MCNC: 13 MG/DL (ref 6–20)
BUN/CREAT SERPL: 19 (ref 12–20)
CALCIUM SERPL-MCNC: 8.5 MG/DL (ref 8.5–10.1)
CHLORIDE SERPL-SCNC: 99 MMOL/L (ref 97–108)
CO2 SERPL-SCNC: 32 MMOL/L (ref 21–32)
CREAT SERPL-MCNC: 0.69 MG/DL (ref 0.55–1.02)
GLUCOSE SERPL-MCNC: 133 MG/DL (ref 65–100)
GRAM STN SPEC: ABNORMAL
POTASSIUM SERPL-SCNC: 3.8 MMOL/L (ref 3.5–5.1)
SERVICE CMNT-IMP: ABNORMAL
SODIUM SERPL-SCNC: 136 MMOL/L (ref 136–145)

## 2017-08-06 PROCEDURE — 65270000032 HC RM SEMIPRIVATE

## 2017-08-06 PROCEDURE — 36415 COLL VENOUS BLD VENIPUNCTURE: CPT | Performed by: HOSPITALIST

## 2017-08-06 PROCEDURE — 74011250637 HC RX REV CODE- 250/637: Performed by: HOSPITALIST

## 2017-08-06 PROCEDURE — 80048 BASIC METABOLIC PNL TOTAL CA: CPT | Performed by: HOSPITALIST

## 2017-08-06 PROCEDURE — 74011250636 HC RX REV CODE- 250/636: Performed by: HOSPITALIST

## 2017-08-06 PROCEDURE — 94640 AIRWAY INHALATION TREATMENT: CPT

## 2017-08-06 PROCEDURE — 74011000250 HC RX REV CODE- 250: Performed by: HOSPITALIST

## 2017-08-06 RX ADMIN — ARFORMOTEROL TARTRATE 15 MCG: 15 SOLUTION RESPIRATORY (INHALATION) at 19:56

## 2017-08-06 RX ADMIN — CLINDAMYCIN PHOSPHATE 600 MG: 12 INJECTION, SOLUTION INTRAMUSCULAR; INTRAVENOUS at 09:42

## 2017-08-06 RX ADMIN — Medication 10 ML: at 11:24

## 2017-08-06 RX ADMIN — ESCITALOPRAM OXALATE 10 MG: 10 TABLET ORAL at 09:36

## 2017-08-06 RX ADMIN — DICYCLOMINE HYDROCHLORIDE 10 MG: 10 CAPSULE ORAL at 17:19

## 2017-08-06 RX ADMIN — HYDROCHLOROTHIAZIDE 25 MG: 25 TABLET ORAL at 09:36

## 2017-08-06 RX ADMIN — ACETAMINOPHEN 650 MG: 325 TABLET, FILM COATED ORAL at 20:01

## 2017-08-06 RX ADMIN — ESTROGENS, CONJUGATED 0.62 MG: 0.62 TABLET, FILM COATED ORAL at 09:36

## 2017-08-06 RX ADMIN — MORPHINE SULFATE 2 MG: 2 INJECTION, SOLUTION INTRAMUSCULAR; INTRAVENOUS at 15:28

## 2017-08-06 RX ADMIN — MORPHINE SULFATE 2 MG: 2 INJECTION, SOLUTION INTRAMUSCULAR; INTRAVENOUS at 03:10

## 2017-08-06 RX ADMIN — SODIUM CHLORIDE 75 ML/HR: 900 INJECTION, SOLUTION INTRAVENOUS at 11:25

## 2017-08-06 RX ADMIN — BUDESONIDE 500 MCG: 0.5 INHALANT RESPIRATORY (INHALATION) at 08:11

## 2017-08-06 RX ADMIN — SODIUM CHLORIDE 75 ML/HR: 900 INJECTION, SOLUTION INTRAVENOUS at 21:30

## 2017-08-06 RX ADMIN — BUDESONIDE 500 MCG: 0.5 INHALANT RESPIRATORY (INHALATION) at 19:57

## 2017-08-06 RX ADMIN — ACETAMINOPHEN 650 MG: 325 TABLET, FILM COATED ORAL at 12:54

## 2017-08-06 RX ADMIN — MORPHINE SULFATE 2 MG: 2 INJECTION, SOLUTION INTRAMUSCULAR; INTRAVENOUS at 20:01

## 2017-08-06 RX ADMIN — MORPHINE SULFATE 2 MG: 2 INJECTION, SOLUTION INTRAMUSCULAR; INTRAVENOUS at 07:03

## 2017-08-06 RX ADMIN — DICYCLOMINE HYDROCHLORIDE 10 MG: 10 CAPSULE ORAL at 09:36

## 2017-08-06 RX ADMIN — MORPHINE SULFATE 2 MG: 2 INJECTION, SOLUTION INTRAMUSCULAR; INTRAVENOUS at 11:24

## 2017-08-06 RX ADMIN — CLINDAMYCIN PHOSPHATE 600 MG: 12 INJECTION, SOLUTION INTRAMUSCULAR; INTRAVENOUS at 21:25

## 2017-08-06 RX ADMIN — ARFORMOTEROL TARTRATE 15 MCG: 15 SOLUTION RESPIRATORY (INHALATION) at 08:11

## 2017-08-06 RX ADMIN — ACETAMINOPHEN 650 MG: 325 TABLET, FILM COATED ORAL at 05:19

## 2017-08-06 RX ADMIN — MORPHINE SULFATE 2 MG: 2 INJECTION, SOLUTION INTRAMUSCULAR; INTRAVENOUS at 23:58

## 2017-08-06 RX ADMIN — LEVOFLOXACIN 500 MG: 5 INJECTION, SOLUTION INTRAVENOUS at 15:03

## 2017-08-06 RX ADMIN — ENOXAPARIN SODIUM 40 MG: 40 INJECTION SUBCUTANEOUS at 09:48

## 2017-08-06 RX ADMIN — Medication 10 ML: at 05:21

## 2017-08-06 RX ADMIN — Medication 10 ML: at 15:03

## 2017-08-06 NOTE — PROGRESS NOTES
Hospitalist Progress Note  Huy Gómez MD  Office: [phone]        Date of Service:  2017  NAME:  Irene Casas  :  1950  MRN:  [id number]      Admission Summary:   From H&P:Jovon Platt is a 79 y.o. female who presents to ED complaining of left foot pain. patient states she fell off her front porch, steps concrete. She hit and drug both feet and forehead and nose. All wounds appeared to heal,   The left 1st and 2nd toe was healing slowly. She reports pain throughout the work day. She got home and took 2 aspirin and went to sleep. When she woke up, she was nauseous and diaphoretic. Her foot felt tight and she had pain going up her leg. She decided to seek medical attention. Past Medical History:   Diagnosis Date    Asthma     Gastrointestinal disorder     diverticulitis    Ovarian cyst        Interval history / Subjective:     Left foot swelling and erythema better. Assessment & Plan:   Left foot cellulitis with purulent discharge,superficial ulcer on the 2nd toe  -Continue IV clindamycin  -Xray ,no FB. US +edema  -Wound culture light possible staph,c/s pending. .  -Consult Podiatry,recommending adding cipro for gram negative coverage  -Noted pustule between the 2nd and 3rd toes  -Continue current antibiotics,reassess tomorrow. HTN: controlled with HCTZ  Mild hypokalemia: replace  Obese  Body mass index is 36.96 kg/(m^2).    -Diet and activity counseling     Asthma,stable: nebs      Diet:cardiac  Code status: full  DVT prophylaxis: lovenox  Care Plan discussed with: patient,RN,IDR team    Discharge planning/disposition:home in 2-3 days    Hospital Problems  Date Reviewed: 2016          Codes Class Noted POA    Cellulitis of left lower extremity ICD-10-CM: L03.116  ICD-9-CM: 682.6  8/3/2017 Unknown                Review of Systems:   A comprehensive review of systems was negative except for that written in the HPI.     Physical Examination:      Last 24hrs VS reviewed since prior progress note. Most recent are:  Visit Vitals    /79 (BP 1 Location: Right arm, BP Patient Position: At rest;Supine)    Pulse 70    Temp 98.7 °F (37.1 °C)    Resp 16    Ht 5' 6\" (1.676 m)    Wt 103.9 kg (229 lb)    SpO2 92%    BMI 36.96 kg/m2           Constitutional:  No acute distress, cooperative, pleasant    Eyes: Non icteric,non pallor,no erythema,discharge,PERRLA   ENT:  Oral mucous moist, oropharynx benign. Neck supple,    Resp:  CTA bilaterally. No wheezing/rhonchi/rales. No accessory muscle use   CV:  Regular rhythm, normal rate, no murmurs, gallops, rubs    GI:  Soft, non distended, non tender. normoactive bowel sounds, no hepatosplenomegaly    :  No CVA or suprapubic tenderness   Skin  :  No erythema,rash,bullae,dipigmentation     Musculoskeletal:  Left foot less swollen ,superficial wound on the 2nd toe. Neurologic:  AAOx3, CN II-XII reviewed. Moves all extremities. Psych:  Good insight, Not anxious nor agitated.          Intake/Output Summary (Last 24 hours) at 08/06/17 1333  Last data filed at 08/06/17 0904   Gross per 24 hour   Intake             1570 ml   Output                0 ml   Net             1570 ml          Data Review:    Review and/or order of clinical lab test  Review and/or order of tests in the radiology section of CPT  Review and/or order of tests in the medicine section of CPT      Labs:     Recent Labs      08/04/17 0321 08/03/17 2058   WBC  7.5  11.4*   HGB  9.8*  11.7   HCT  30.9*  36.1   PLT  264  283     Recent Labs      08/06/17   0507  08/04/17   0321  08/03/17 2058   NA  136  136  135*   K  3.8  3.4*  3.5   CL  99  99  98   CO2  32  30  29   BUN  13  21*  24*   CREA  0.69  0.75  0.86   GLU  133*  134*  137*   CA  8.5  8.2*  9.0     Recent Labs      08/03/17 2058   SGOT  12*   ALT  15   AP  78   TBILI  0.3   TP  7.8   ALB  3.7   GLOB  4.1*     No results for input(s): INR, PTP, APTT in the last 72 hours. No lab exists for component: INREXT, INREXT   No results for input(s): FE, TIBC, PSAT, FERR in the last 72 hours. No results found for: FOL, RBCF   No results for input(s): PH, PCO2, PO2 in the last 72 hours. No results for input(s): CPK, CKNDX, TROIQ in the last 72 hours.     No lab exists for component: CPKMB  No results found for: CHOL, CHOLX, CHLST, CHOLV, HDL, LDL, LDLC, DLDLP, TGLX, TRIGL, TRIGP, CHHD, CHHDX  Lab Results   Component Value Date/Time    Glucose (POC) 104 08/08/2016 11:17 AM    Glucose (POC) 120 08/08/2016 06:06 AM    Glucose (POC) 128 08/07/2016 09:01 PM    Glucose (POC) 105 08/07/2016 04:48 PM    Glucose (POC) 96 08/07/2016 12:11 PM     No results found for: COLOR, APPRN, SPGRU, REFSG, RAYA, PROTU, GLUCU, KETU, BILU, UROU, YEISON, LEUKU, GLUKE, EPSU, BACTU, WBCU, RBCU, CASTS, UCRY      Medications Reviewed:     Current Facility-Administered Medications   Medication Dose Route Frequency    levoFLOXacin (LEVAQUIN) 500 mg in D5W IVPB  500 mg IntraVENous Q24H    albuterol (PROVENTIL VENTOLIN) nebulizer solution 2.5 mg  2.5 mg Nebulization Q4H PRN    conjugated estrogens (PREMARIN) tablet 0.625 mg  0.625 mg Oral DAILY    hydroCHLOROthiazide (HYDRODIURIL) tablet 25 mg  25 mg Oral DAILY    dicyclomine (BENTYL) capsule 10 mg  10 mg Oral BID    escitalopram oxalate (LEXAPRO) tablet 10 mg  10 mg Oral DAILY    arformoterol (BROVANA) neb solution 15 mcg  15 mcg Nebulization BID RT    And    budesonide (PULMICORT) 500 mcg/2 ml nebulizer suspension  500 mcg Nebulization BID RT    morphine injection 2 mg  2 mg IntraVENous Q4H PRN    ondansetron (ZOFRAN) injection 4 mg  4 mg IntraVENous Q4H PRN    sodium chloride (NS) flush 5-10 mL  5-10 mL IntraVENous Q8H    sodium chloride (NS) flush 5-10 mL  5-10 mL IntraVENous PRN    enoxaparin (LOVENOX) injection 40 mg  40 mg SubCUTAneous Q24H    0.9% sodium chloride infusion  75 mL/hr IntraVENous CONTINUOUS    clindamycin (CLEOCIN) 600mg D5W 50mL IVPB (premix)  600 mg IntraVENous Q12H    acetaminophen (TYLENOL) tablet 650 mg  650 mg Oral Q6H PRN     ______________________________________________________________________  EXPECTED LENGTH OF STAY: 3d 9h  ACTUAL LENGTH OF STAY:          3                 Errol Macias MD

## 2017-08-06 NOTE — PROGRESS NOTES
Bedside shift change report given to Beau Mattson (oncoming nurse) by Amanda Benedict RN (offgoing nurse). Report included the following information SBAR, Kardex, Intake/Output, MAR and Recent Results.

## 2017-08-07 VITALS
DIASTOLIC BLOOD PRESSURE: 82 MMHG | WEIGHT: 229 LBS | RESPIRATION RATE: 18 BRPM | BODY MASS INDEX: 36.8 KG/M2 | OXYGEN SATURATION: 94 % | HEART RATE: 64 BPM | HEIGHT: 66 IN | SYSTOLIC BLOOD PRESSURE: 152 MMHG | TEMPERATURE: 97.9 F

## 2017-08-07 LAB
BACTERIA SPEC CULT: ABNORMAL
GRAM STN SPEC: ABNORMAL
SERVICE CMNT-IMP: ABNORMAL

## 2017-08-07 PROCEDURE — 74011250637 HC RX REV CODE- 250/637: Performed by: HOSPITALIST

## 2017-08-07 PROCEDURE — 74011250636 HC RX REV CODE- 250/636: Performed by: HOSPITALIST

## 2017-08-07 PROCEDURE — 94640 AIRWAY INHALATION TREATMENT: CPT

## 2017-08-07 PROCEDURE — 74011000250 HC RX REV CODE- 250: Performed by: HOSPITALIST

## 2017-08-07 PROCEDURE — 77030011255 HC DSG AQUACEL AG BMS -A

## 2017-08-07 RX ORDER — ASPIRIN 325 MG
325 TABLET ORAL DAILY
Status: DISCONTINUED | OUTPATIENT
Start: 2017-08-07 | End: 2017-08-07

## 2017-08-07 RX ORDER — TRAMADOL HYDROCHLORIDE 100 MG/1
100 TABLET, FILM COATED, EXTENDED RELEASE ORAL DAILY
Qty: 10 TAB | Refills: 0 | Status: ON HOLD | OUTPATIENT
Start: 2017-08-07 | End: 2018-11-15

## 2017-08-07 RX ORDER — CLINDAMYCIN HYDROCHLORIDE 300 MG/1
300 CAPSULE ORAL 3 TIMES DAILY
Qty: 45 CAP | Refills: 0 | Status: ON HOLD | OUTPATIENT
Start: 2017-08-07 | End: 2018-11-15

## 2017-08-07 RX ADMIN — CLINDAMYCIN PHOSPHATE 600 MG: 12 INJECTION, SOLUTION INTRAMUSCULAR; INTRAVENOUS at 09:06

## 2017-08-07 RX ADMIN — ENOXAPARIN SODIUM 40 MG: 40 INJECTION SUBCUTANEOUS at 09:05

## 2017-08-07 RX ADMIN — MORPHINE SULFATE 2 MG: 2 INJECTION, SOLUTION INTRAMUSCULAR; INTRAVENOUS at 04:13

## 2017-08-07 RX ADMIN — ACETAMINOPHEN 650 MG: 325 TABLET, FILM COATED ORAL at 09:05

## 2017-08-07 RX ADMIN — HYDROCHLOROTHIAZIDE 25 MG: 25 TABLET ORAL at 09:05

## 2017-08-07 RX ADMIN — MORPHINE SULFATE 2 MG: 2 INJECTION, SOLUTION INTRAMUSCULAR; INTRAVENOUS at 07:56

## 2017-08-07 RX ADMIN — LEVOFLOXACIN 500 MG: 5 INJECTION, SOLUTION INTRAVENOUS at 16:21

## 2017-08-07 RX ADMIN — SODIUM CHLORIDE 75 ML/HR: 900 INJECTION, SOLUTION INTRAVENOUS at 12:18

## 2017-08-07 RX ADMIN — MORPHINE SULFATE 2 MG: 2 INJECTION, SOLUTION INTRAMUSCULAR; INTRAVENOUS at 12:17

## 2017-08-07 RX ADMIN — DICYCLOMINE HYDROCHLORIDE 10 MG: 10 CAPSULE ORAL at 09:05

## 2017-08-07 RX ADMIN — ESCITALOPRAM OXALATE 10 MG: 10 TABLET ORAL at 09:05

## 2017-08-07 RX ADMIN — ESTROGENS, CONJUGATED 0.62 MG: 0.62 TABLET, FILM COATED ORAL at 09:05

## 2017-08-07 RX ADMIN — ARFORMOTEROL TARTRATE 15 MCG: 15 SOLUTION RESPIRATORY (INHALATION) at 08:17

## 2017-08-07 RX ADMIN — BUDESONIDE 500 MCG: 0.5 INHALANT RESPIRATORY (INHALATION) at 08:17

## 2017-08-07 RX ADMIN — MORPHINE SULFATE 2 MG: 2 INJECTION, SOLUTION INTRAMUSCULAR; INTRAVENOUS at 16:26

## 2017-08-07 NOTE — PROGRESS NOTES
Hospitalist Progress Note  China Coats MD  Office: [phone]        Date of Service:  2017  NAME:  Nirmala Dye  :  1950  MRN:  [id number]      Admission Summary:   From H&P:Jovon Perera is a 79 y.o. female who presents to ED complaining of left foot pain.  patient states she fell off her front porch, steps concrete.  She hit and drug both feet and forehead and nose.  All wounds appeared to heal,   The left 1st and 2nd toe was healing slowly.  She reports pain throughout the work day. Shannan Herrmann got home and took 2 aspirin and went to sleep.  When she woke up, she was nauseous and diaphoretic.  Her foot felt tight and she had pain going up her leg.  She decided to seek medical attention. Interval history / Subjective:    refer that she still having some pain after the podiatry remove the skin from her blisters     Assessment & Plan:     Left Foot cellulitis     Local only, patient need to continue with local care and ABX. Will discharge patient with PO Clinda and local care  HTN. Stable clinically. Asthma. Stable clinically, no wheezing    Code status: Full  DVT prophylaxis: Heparin    Care Plan discussed with: Patient/Family and Nurse  Disposition: Home w/Family and TBD     Hospital Problems  Date Reviewed: 2016          Codes Class Noted POA    Cellulitis of left lower extremity ICD-10-CM: L03.116  ICD-9-CM: 682.6  8/3/2017 Unknown                Review of Systems:   A comprehensive review of systems was negative except for that written in the HPI. Vital Signs:    Last 24hrs VS reviewed since prior progress note.  Most recent are:  Visit Vitals    /68 (BP 1 Location: Right arm, BP Patient Position: At rest)    Pulse 70    Temp 98.1 °F (36.7 °C)    Resp 16    Ht 5' 6\" (1.676 m)    Wt 103.9 kg (229 lb)    SpO2 92%    BMI 36.96 kg/m2         Intake/Output Summary (Last 24 hours) at 17 3181  Last data filed at 08/07/17 0815   Gross per 24 hour   Intake              940 ml   Output                0 ml   Net              940 ml        Physical Examination:             Constitutional:  No acute distress, cooperative, pleasant    ENT:  Oral mucous moist, oropharynx benign. Neck supple,    Resp:  CTA bilaterally. No wheezing/rhonchi/rales. No accessory muscle use   CV:  Regular rhythm, normal rate, no murmurs, gallops, rubs    GI:  Soft, non distended, non tender. normoactive bowel sounds, no hepatosplenomegaly     Musculoskeletal:  No edema, warm, 2+ pulses throughout    Neurologic:  Moves all extremities. AAOx3, CN II-XII reviewed     Skin:  dorsal area of the foot with some erythema present, no edema        Data Review:    Review and/or order of clinical lab test      Labs:   No results for input(s): WBC, HGB, HCT, PLT, HGBEXT, HCTEXT, PLTEXT in the last 72 hours. Recent Labs      08/06/17   0507   NA  136   K  3.8   CL  99   CO2  32   BUN  13   CREA  0.69   GLU  133*   CA  8.5     No results for input(s): SGOT, GPT, ALT, AP, TBIL, TBILI, TP, ALB, GLOB, GGT, AML, LPSE in the last 72 hours. No lab exists for component: AMYP, HLPSE  No results for input(s): INR, PTP, APTT in the last 72 hours. No lab exists for component: INREXT   No results for input(s): FE, TIBC, PSAT, FERR in the last 72 hours. No results found for: FOL, RBCF   No results for input(s): PH, PCO2, PO2 in the last 72 hours. No results for input(s): CPK, CKNDX, TROIQ in the last 72 hours.     No lab exists for component: CPKMB  No results found for: CHOL, CHOLX, CHLST, CHOLV, HDL, LDL, LDLC, DLDLP, TGLX, TRIGL, TRIGP, CHHD, CHHDX  Lab Results   Component Value Date/Time    Glucose (POC) 104 08/08/2016 11:17 AM    Glucose (POC) 120 08/08/2016 06:06 AM    Glucose (POC) 128 08/07/2016 09:01 PM    Glucose (POC) 105 08/07/2016 04:48 PM    Glucose (POC) 96 08/07/2016 12:11 PM     No results found for: COLOR, APPRN, SPGRU, REFSG, RAYA, PROTU, GLUCU, KETU, BILU, UROU, YEISON, LEUKU, GLUKE, EPSU, BACTU, WBCU, RBCU, CASTS, UCRY      Medications Reviewed:     Current Facility-Administered Medications   Medication Dose Route Frequency    levoFLOXacin (LEVAQUIN) 500 mg in D5W IVPB  500 mg IntraVENous Q24H    albuterol (PROVENTIL VENTOLIN) nebulizer solution 2.5 mg  2.5 mg Nebulization Q4H PRN    conjugated estrogens (PREMARIN) tablet 0.625 mg  0.625 mg Oral DAILY    hydroCHLOROthiazide (HYDRODIURIL) tablet 25 mg  25 mg Oral DAILY    dicyclomine (BENTYL) capsule 10 mg  10 mg Oral BID    escitalopram oxalate (LEXAPRO) tablet 10 mg  10 mg Oral DAILY    arformoterol (BROVANA) neb solution 15 mcg  15 mcg Nebulization BID RT    And    budesonide (PULMICORT) 500 mcg/2 ml nebulizer suspension  500 mcg Nebulization BID RT    morphine injection 2 mg  2 mg IntraVENous Q4H PRN    ondansetron (ZOFRAN) injection 4 mg  4 mg IntraVENous Q4H PRN    sodium chloride (NS) flush 5-10 mL  5-10 mL IntraVENous Q8H    sodium chloride (NS) flush 5-10 mL  5-10 mL IntraVENous PRN    enoxaparin (LOVENOX) injection 40 mg  40 mg SubCUTAneous Q24H    0.9% sodium chloride infusion  75 mL/hr IntraVENous CONTINUOUS    clindamycin (CLEOCIN) 600mg D5W 50mL IVPB (premix)  600 mg IntraVENous Q12H    acetaminophen (TYLENOL) tablet 650 mg  650 mg Oral Q6H PRN     ______________________________________________________________________  EXPECTED LENGTH OF STAY: 3d 9h  ACTUAL LENGTH OF STAY:          4                 Rossy Pillai MD

## 2017-08-07 NOTE — PROGRESS NOTES
Podiatry  -Pt seen & examined at bedside. Denies any n/v/f/ns/c. Pt eager to go home.   -Left foot cellulitis significantly improved. +new blister noted along base of 2nd toe extending to 2nd interspace - drained at bedside. Serosanguinous drainage expressed. No pus.    -Ok to d/c Pt to home today from my standpoint. Wound cxs = MSSA. Recommend 2 weeks of po clinda 300mg TID. Will order sx shoe - Pt allowed to fully weightbear to left foot. Pt to follow up with me in my office next week. See below for discharge instructions and wound care orders. Discharge Instructions  1.) Follow up with Dr. Horacio Meng ([phone]) in one week. 2.) Apply moistened aquacell Ag and gauze dressing to left 2nd toe wound daily. 3.) Full weightbearing to left foot with surgical shoe.   Elevate foot when at rest.

## 2017-08-07 NOTE — DISCHARGE INSTRUCTIONS
Need local care in daily basis. MUST FOLLOW UP with podiatry. Return to ER if fever develop and redness and pain. Discharge Instructions  1.) Follow up with Dr. Kaycee Inman ([phone]) in one week. 2.) Apply moistened aquacell Ag and gauze dressing to left 2nd toe wound daily. 3.) Full weightbearing to left foot with surgical shoe.   Elevate foot when at rest.

## 2017-08-07 NOTE — PROGRESS NOTES
Bedside shift change report given to KALYANI Cooper (oncoming nurse) by Lilliana Hollins RN (offgoing nurse). Report included the following information SBAR, Intake/Output and MAR.

## 2017-08-07 NOTE — PROGRESS NOTES
Patient discharged home. Discharge instructions and prescriptions given and explained to patient. Patient verbalized understanding. Peripheral IV discontinued, tip intact, pt tolerated well. Patient off unit via wheelchair. Pt's friend to transport pt home.

## 2017-08-08 NOTE — DISCHARGE SUMMARY
[address] [address], [address] [place] [address] SUMMARY       Name:  Jhonny Lubin   MR#:  [id number]   :  1950   Account #:  [de-identified]        Date of Adm:  2017       DISCHARGE DIAGNOSES:    1. Left foot cellulitis. 2.  Hypertension. 3.  Chronic obstructive pulmonary disease. 4.  Obesity. CONSULT DONE:  Podiatry. STUDIES DONE:  Culture and sensitivity of the wound. HOSPITAL COURSE:  The patient is a 59-year-old patient who was   admitted on 2017 when she started complaining of left foot pain. The foot pain was proceeded by a fall from her porch in which the left   foot got behind and hit. She developed some blisters and redness for   which she was seen in the hospital and admitted. Culture done from   the area of interest grew a Staphylococcus aureus that was multi-  sensitive. During this hospital stay, she was admitted and treated with   IV antibiotic until the patient had become completely asymptomatic and   switched from IV to p.o. Today, the Podiatry has seen the patient who   agreed to discharge the patient with clindamycin for 10 days. DISCHARGE MEDICATIONS:   1. Clindamycin 300 mg p.o. 3 times a day. 2.  Tramadol as needed for pain. 3.  Advair 100/50 mcg, 1 inhalation b.i.d.   4.  Albuterol as needed. 5.  Bentyl 10 mg daily as needed for pain. 6.  Lexapro 10 mg daily. 7.  Hydrochlorothiazide 25 mg p.o. daily. 8.  Premarin 0.625 daily. DIET:  Low salt diet. ACTIVITY:  As tolerated. DISCHARGE INSTRUCTIONS:  The patient has been instructed to   keep the wound clean, to clean the wound every day, to protect the   wound if she could come to wear closed shoes. Also, the wound care   nurse has seen the patient for further instructions and treatment.               Diaz Lee MD      ME / [address] [place] [address]   D:  2017   17:08   T:  2017   21:52   Job #:  [id number]

## 2017-08-09 LAB
BACTERIA SPEC CULT: NORMAL
BACTERIA SPEC CULT: NORMAL
SERVICE CMNT-IMP: NORMAL
SERVICE CMNT-IMP: NORMAL

## 2017-10-28 ENCOUNTER — APPOINTMENT (OUTPATIENT)
Dept: GENERAL RADIOLOGY | Age: 67
End: 2017-10-28
Attending: EMERGENCY MEDICINE
Payer: COMMERCIAL

## 2017-10-28 ENCOUNTER — APPOINTMENT (OUTPATIENT)
Dept: CT IMAGING | Age: 67
End: 2017-10-28
Attending: PHYSICIAN ASSISTANT
Payer: COMMERCIAL

## 2017-10-28 ENCOUNTER — HOSPITAL ENCOUNTER (EMERGENCY)
Age: 67
Discharge: HOME | End: 2017-10-28
Attending: EMERGENCY MEDICINE | Admitting: EMERGENCY MEDICINE
Payer: COMMERCIAL

## 2017-10-28 VITALS
SYSTOLIC BLOOD PRESSURE: 149 MMHG | OXYGEN SATURATION: 97 % | HEART RATE: 69 BPM | RESPIRATION RATE: 16 BRPM | DIASTOLIC BLOOD PRESSURE: 65 MMHG | HEIGHT: 67 IN | BODY MASS INDEX: 34.37 KG/M2 | TEMPERATURE: 98 F | WEIGHT: 219 LBS

## 2017-10-28 DIAGNOSIS — S90.31XA CONTUSION OF RIGHT FOOT, INITIAL ENCOUNTER: Primary | ICD-10-CM

## 2017-10-28 PROCEDURE — 74011250637 HC RX REV CODE- 250/637: Performed by: PHYSICIAN ASSISTANT

## 2017-10-28 PROCEDURE — 73630 X-RAY EXAM OF FOOT: CPT

## 2017-10-28 PROCEDURE — 73700 CT LOWER EXTREMITY W/O DYE: CPT

## 2017-10-28 PROCEDURE — 75810000053 HC SPLINT APPLICATION

## 2017-10-28 PROCEDURE — 99283 EMERGENCY DEPT VISIT LOW MDM: CPT

## 2017-10-28 RX ORDER — OXYCODONE AND ACETAMINOPHEN 5; 325 MG/1; MG/1
1 TABLET ORAL
Status: COMPLETED | OUTPATIENT
Start: 2017-10-28 | End: 2017-10-28

## 2017-10-28 RX ORDER — HYDROCODONE BITARTRATE AND ACETAMINOPHEN 5; 325 MG/1; MG/1
1 TABLET ORAL
Qty: 12 TAB | Refills: 0 | Status: ON HOLD | OUTPATIENT
Start: 2017-10-28 | End: 2018-11-15

## 2017-10-28 RX ADMIN — OXYCODONE HYDROCHLORIDE AND ACETAMINOPHEN 1 TABLET: 5; 325 TABLET ORAL at 13:19

## 2017-10-28 NOTE — ED TRIAGE NOTES
TRIAGE NOTE: \"At 0300 I got up to go to the bathroom and the kitchen bench fell directly on my right foot. \"

## 2017-10-28 NOTE — ED PROVIDER NOTES
HPI Comments: 79year old female presenting for foot pain after blunt trauma. Pt reports unable to bear weight. Denies other injuries or concerns. PMHx: diverticulitis, asthma, ovarian cyst  Social: non-smoker    Patient is a 79 y.o. female presenting with foot pain. The history is provided by the patient. Foot Pain    This is a new problem. Episode onset: about 3AM this morning. The problem occurs constantly. The problem has been gradually worsening. Pain location: the entire right foot with radiation up to the knee. The quality of the pain is described as aching and pounding. The pain is at a severity of 8/10. The pain is moderate. Associated symptoms include limited range of motion. The symptoms are aggravated by palpation, standing and movement. She has tried nothing for the symptoms. There has been a history of trauma (a heavy wooden bench tipped over and landed on the foot). Past Medical History:   Diagnosis Date    Asthma     Gastrointestinal disorder     diverticulitis    Ovarian cyst        Past Surgical History:   Procedure Laterality Date    HX APPENDECTOMY      HX CHOLECYSTECTOMY      HX HYSTERECTOMY      partial, has ovaries    HX ORTHOPAEDIC      tennis elbow         History reviewed. No pertinent family history. Social History     Social History    Marital status:      Spouse name: N/A    Number of children: N/A    Years of education: N/A     Occupational History    Not on file. Social History Main Topics    Smoking status: Never Smoker    Smokeless tobacco: Never Used    Alcohol use Yes      Comment: once per 6 months at social event    Drug use: No    Sexual activity: Not on file     Other Topics Concern    Not on file     Social History Narrative         ALLERGIES: Garlic and Penicillin g    Review of Systems   Constitutional: Negative for fever. Respiratory: Negative for shortness of breath. Cardiovascular: Negative for chest pain. Gastrointestinal: Negative for diarrhea and vomiting. Musculoskeletal: Positive for arthralgias and joint swelling. Skin: Positive for color change. Neurological: Negative for syncope. All other systems reviewed and are negative. Vitals:    10/28/17 1051   BP: 154/70   Pulse: 74   Resp: 16   Temp: 97.8 °F (36.6 °C)   SpO2: 95%   Weight: 99.3 kg (219 lb)   Height: 5' 7\" (1.702 m)            Physical Exam   Constitutional: She is oriented to person, place, and time. She appears well-developed and well-nourished. No distress. Pleasant WF   HENT:   Head: Normocephalic and atraumatic. Right Ear: External ear normal.   Left Ear: External ear normal.   Eyes: Conjunctivae are normal. No scleral icterus. Neck: Neck supple. No tracheal deviation present. Cardiovascular: Normal rate, regular rhythm and normal heart sounds. Exam reveals no gallop and no friction rub. No murmur heard. Pulmonary/Chest: Effort normal and breath sounds normal. No stridor. No respiratory distress. She has no wheezes. Abdominal: Soft. She exhibits no distension. Musculoskeletal: She exhibits edema and tenderness. RIGHT FOOT: impressive edema, ecchymosis, and tenderness over the entire dorsum of the foot, most pronounced in the mid-foot. Swelling extends up to the ankle. Pt cannot range the ankle due to pain. Minimal ROM of the toes. Brisk cap refill, sensation intact, 2+ DP pulse. No calf or knee TTP. Neurological: She is alert and oriented to person, place, and time. Skin: Skin is warm and dry. Psychiatric: She has a normal mood and affect. Her behavior is normal.   Nursing note and vitals reviewed. MDM  Number of Diagnoses or Management Options  Diagnosis management comments: 79year old female presenting for foot pain after a heavy wooden bench fell on it last night. Impressive degree of swelling, bruising, tenderness on exam, NVID.   Initial XR normal, given high suspicion of occult fx CT obtained which was normal.  Pt splinted and given crutches for comfort, ortho f/u, return precautions.        Amount and/or Complexity of Data Reviewed  Tests in the radiology section of CPT®: ordered and reviewed  Discuss the patient with other providers: yes (Dr. Arsenio Quiros, ED attending)      ED Course       Procedures

## 2017-10-28 NOTE — DISCHARGE INSTRUCTIONS
Bruises: Care Instructions  Your Care Instructions    Bruises occur when small blood vessels under the skin tear or rupture, most often from a twist, bump, or fall. Blood leaks into tissues under the skin and causes a black-and-blue spot that often turns colors, including purplish black, reddish blue, or yellowish green, as the bruise heals. Bruises hurt, but most are not serious and will go away on their own within 2 to 4 weeks. Sometimes, gravity causes them to spread down the body. A leg bruise usually will take longer to heal than a bruise on the face or arms. Follow-up care is a key part of your treatment and safety. Be sure to make and go to all appointments, and call your doctor if you are having problems. It's also a good idea to know your test results and keep a list of the medicines you take. How can you care for yourself at home? · Take pain medicines exactly as directed. ¨ If the doctor gave you a prescription medicine for pain, take it as prescribed. ¨ If you are not taking a prescription pain medicine, ask your doctor if you can take an over-the-counter medicine. · Put ice or a cold pack on the area for 10 to 20 minutes at a time. Put a thin cloth between the ice and your skin. · If you can, prop up the bruised area on pillows as much as possible for the next few days. Try to keep the bruise above the level of your heart. When should you call for help? Call your doctor now or seek immediate medical care if:  ? · You have signs of infection, such as:  ¨ Increased pain, swelling, warmth, or redness. ¨ Red streaks leading from the bruise. ¨ Pus draining from the bruise. ¨ A fever. ? · You have a bruise on your leg and signs of a blood clot, such as:  ¨ Increasing redness and swelling along with warmth, tenderness, and pain in the bruised area. ¨ Pain in your calf, back of the knee, thigh, or groin. ¨ Redness and swelling in your leg or groin. ? · Your pain gets worse. ? Watch closely for changes in your health, and be sure to contact your doctor if:  ? · You do not get better as expected. Where can you learn more? Go to http://miranda-maren.info/. Enter [phone] in the search box to learn more about \"Bruises: Care Instructions. \"  Current as of: March 20, 2017  Content Version: 11.4  © 4925-8283 [employer]. Care instructions adapted under license by [employer] (which disclaims liability or warranty for this information). If you have questions about a medical condition or this instruction, always ask your healthcare professional. [place] [address] any warranty or liability for your use of this information. Contusion: Care Instructions  Your Care Instructions  Contusion is the medical term for a bruise. It is the result of a direct blow or an impact, such as a fall. Contusions are common sports injuries. Most people think of a bruise as a black-and-blue spot. This happens when small blood vessels get torn and leak blood under the skin. But bones, muscles, and organs can also get bruised. This may damage deep tissues but not cause a bruise you can see. The doctor will do a physical exam to find the location of your contusion. You may also have tests to make sure you do not have a more serious injury, such as a broken bone or nerve damage. These may include X-rays or other imaging tests like a CT scan or MRI. Deep-tissue contusions may cause pain and swelling. But if there is no serious damage, they will often get better in a few weeks with home treatment. The doctor has checked you carefully, but problems can develop later. If you notice any problems or new symptoms, get medical treatment right away. Follow-up care is a key part of your treatment and safety. Be sure to make and go to all appointments, and call your doctor if you are having problems.  It's also a good idea to know your test results and keep a list of the medicines you take. How can you care for yourself at home? · Put ice or a cold pack on the sore area for 10 to 20 minutes at a time to stop swelling. Put a thin cloth between the ice pack and your skin. · Be safe with medicines. Read and follow all instructions on the label. ¨ If the doctor gave you a prescription medicine for pain, take it as prescribed. ¨ If you are not taking a prescription pain medicine, ask your doctor if you can take an over-the-counter medicine. · If you can, prop up the sore area on pillows as much as possible for the next few days. Try to keep the sore area above the level of your heart. When should you call for help? Call your doctor now or seek immediate medical care if:  · Your pain gets worse. · You have new or worse swelling. · You have tingling, weakness, or numbness in the area near the contusion. · The area near the contusion is cold or pale. Watch closely for changes in your health, and be sure to contact your doctor if:  · You do not get better as expected. Where can you learn more? Go to ebindle.be  Enter [id number] in the search box to learn more about \"Contusion: Care Instructions. \"   © 6158-3204 [employer]. Care instructions adapted under license by Rocío Humph[employer] (which disclaims liability or warranty for this information). This care instruction is for use with your licensed healthcare professional. If you have questions about a medical condition or this instruction, always ask your healthcare professional. [place] [address] any warranty or liability for your use of this information.   Content Version: 55.8.424491; Current as of: May 22, 2015

## 2018-10-16 ENCOUNTER — APPOINTMENT (OUTPATIENT)
Dept: CT IMAGING | Age: 68
End: 2018-10-16
Attending: EMERGENCY MEDICINE
Payer: COMMERCIAL

## 2018-10-16 ENCOUNTER — HOSPITAL ENCOUNTER (EMERGENCY)
Age: 68
Discharge: HOME | End: 2018-10-16
Attending: EMERGENCY MEDICINE
Payer: COMMERCIAL

## 2018-10-16 ENCOUNTER — APPOINTMENT (OUTPATIENT)
Dept: GENERAL RADIOLOGY | Age: 68
End: 2018-10-16
Attending: EMERGENCY MEDICINE
Payer: COMMERCIAL

## 2018-10-16 VITALS
DIASTOLIC BLOOD PRESSURE: 81 MMHG | HEART RATE: 70 BPM | TEMPERATURE: 97.8 F | BODY MASS INDEX: 37.75 KG/M2 | HEIGHT: 67 IN | WEIGHT: 240.52 LBS | SYSTOLIC BLOOD PRESSURE: 174 MMHG | OXYGEN SATURATION: 93 % | RESPIRATION RATE: 16 BRPM

## 2018-10-16 DIAGNOSIS — M25.512 ACUTE PAIN OF LEFT SHOULDER: ICD-10-CM

## 2018-10-16 DIAGNOSIS — M54.50 ACUTE RIGHT-SIDED LOW BACK PAIN WITHOUT SCIATICA: ICD-10-CM
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
## 2018-10-16 PROCEDURE — 70450 CT HEAD/BRAIN W/O DYE: CPT

## 2018-10-16 PROCEDURE — 73030 X-RAY EXAM OF SHOULDER: CPT

## 2018-10-16 PROCEDURE — 93005 ELECTROCARDIOGRAM TRACING: CPT

## 2018-10-16 PROCEDURE — 99282 EMERGENCY DEPT VISIT SF MDM: CPT

## 2018-10-16 PROCEDURE — 74011250637 HC RX REV CODE- 250/637: Performed by: EMERGENCY MEDICINE

## 2018-10-16 RX ORDER — IBUPROFEN 600 MG/1
600 TABLET ORAL
Status: COMPLETED | OUTPATIENT
Start: 2018-10-16 | End: 2018-10-16

## 2018-10-16 RX ORDER — IBUPROFEN 600 MG/1
600 TABLET ORAL
Qty: 20 TAB | Refills: 0 | Status: ON HOLD | OUTPATIENT
Start: 2018-10-16 | End: 2018-11-15

## 2018-10-16 RX ADMIN — IBUPROFEN 600 MG: 600 TABLET ORAL at 21:07
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
## 2018-10-17 LAB
ATRIAL RATE: 74 BPM
CALCULATED P AXIS, ECG09: 66 DEGREES
CALCULATED R AXIS, ECG10: 35 DEGREES
CALCULATED T AXIS, ECG11: 30 DEGREES
DIAGNOSIS, 93000: NORMAL
P-R INTERVAL, ECG05: 126 MS
Q-T INTERVAL, ECG07: 402 MS
QRS DURATION, ECG06: 94 MS
QTC CALCULATION (BEZET), ECG08: 446 MS
VENTRICULAR RATE, ECG03: 74 BPM

## 2018-10-17 NOTE — CALL BACK NOTE
[facility] Emergency Department Follow Up Call Record Discharged to : Home/Family Home/Home Health/Skilled Facility/Rehab/Assisted Living/Other_Home______ 1) Did you receive your discharge instructions? Yes       
2) Do you understand them? Yes        
3) Are you able to follow them? Yes If NO, what can I clarify for you? 4) Do you understand your diagnosis? Yes        
5) Do you know which symptoms should prompt you to call the doctor? Yes    
6) Were you able to fill and  any medications that were prescribed? Yes  
 
7) You were prescribed _motrin__________for ____shoulder pain________________. Common side effects of this medication are__rash__________________. This is not a complete list so please review the forms given from the pharmacy for a complete list. 8) Are there any questions about your medications? No     
 
  
 Have you scheduled any recommended doctors appointments (specialty, PCP) If NO, what barriers are you encountering (transportation/lost contact info/cost/ 
didnt think necessary/no PCP 
9) If discharged with Home Health, has the agency contacted you to schedule visit? Not applicable 10) Is there anyone available to help you at home (meals, errands, transportation   
monitoring) (adult children, neighbors, private duty companions) Yes   
11) Are you on a special diet? No        
If YES, do you understand the requirements for this diet? Education provided? 12) If presented with cough, bronchitis, COPD, asthma, is it ok to ask that the 
 respiratory disease management educator call you? Not applicable 13)  A) If presented with fall, were you issued an assistive device in the ED Are you using? No 
B) If given RX for device, have you obtained? Not applicable If NO, barriers? C) Therapist recommended: 
 Are you able to implement the suggestions? Not applicable If NO, barriers to implementation? D) Are you having any difficulties with mobility inside your home?   
 (steps, bed, tub)No 
 If YES, ask if the [facility] PT can contact patient and good time and number? 
14)  At the end of your discharge instructions, there is information about accessing [facility], have you had a chance to review those? No     
 
 Do you have any questions about signing up for this service? We encourage our patients to be active participants in their healthcare and this site is one of the ways to do that. It will allow you to access parts of your medical record, email your doctors office, schedule appointments, and request medications refills . 15) Are there any other questions that I can answer for you regarding  
 your Emergency department visit? NO Estimated Call Time:__4:17 PM 
_________________ Date/Time:_______________
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
## 2018-11-14 ENCOUNTER — APPOINTMENT (OUTPATIENT)
Dept: GENERAL RADIOLOGY | Age: 68
DRG: 194 | End: 2018-11-14
Attending: EMERGENCY MEDICINE
Payer: COMMERCIAL

## 2018-11-14 ENCOUNTER — HOSPITAL ENCOUNTER (INPATIENT)
Age: 68
LOS: 1 days | Discharge: HOME | DRG: 194 | End: 2018-11-18
Attending: EMERGENCY MEDICINE | Admitting: HOSPITALIST
Payer: COMMERCIAL

## 2018-11-14 ENCOUNTER — APPOINTMENT (OUTPATIENT)
Dept: CT IMAGING | Age: 68
DRG: 194 | End: 2018-11-14
Attending: EMERGENCY MEDICINE
Payer: COMMERCIAL

## 2018-11-14 DIAGNOSIS — R06.02 SOB (SHORTNESS OF BREATH): Primary | ICD-10-CM

## 2018-11-14 LAB
ALBUMIN SERPL-MCNC: 3.4 G/DL (ref 3.5–5)
ALBUMIN/GLOB SERPL: 0.9 {RATIO} (ref 1.1–2.2)
ALP SERPL-CCNC: 84 U/L (ref 45–117)
ALT SERPL-CCNC: 20 U/L (ref 12–78)
ANION GAP SERPL CALC-SCNC: 10 MMOL/L (ref 5–15)
APPEARANCE UR: CLEAR
APTT PPP: 28.2 SEC (ref 22.1–32)
ARTERIAL PATENCY WRIST A: YES
AST SERPL-CCNC: 12 U/L (ref 15–37)
BACTERIA URNS QL MICRO: NEGATIVE /HPF
BASE EXCESS BLD CALC-SCNC: 2 MMOL/L
BASOPHILS # BLD: 0 K/UL (ref 0–0.1)
BASOPHILS NFR BLD: 0 % (ref 0–1)
BDY SITE: ABNORMAL
BILIRUB SERPL-MCNC: 0.3 MG/DL (ref 0.2–1)
BILIRUB UR QL: NEGATIVE
BNP SERPL-MCNC: 137 PG/ML (ref 0–125)
BUN SERPL-MCNC: 22 MG/DL (ref 6–20)
BUN/CREAT SERPL: 23 (ref 12–20)
CALCIUM SERPL-MCNC: 8.6 MG/DL (ref 8.5–10.1)
CHLORIDE SERPL-SCNC: 100 MMOL/L (ref 97–108)
CO2 SERPL-SCNC: 29 MMOL/L (ref 21–32)
COLOR UR: ABNORMAL
COMMENT, HOLDF: NORMAL
CREAT SERPL-MCNC: 0.94 MG/DL (ref 0.55–1.02)
DIFFERENTIAL METHOD BLD: ABNORMAL
EOSINOPHIL # BLD: 0 K/UL (ref 0–0.4)
EOSINOPHIL NFR BLD: 0 % (ref 0–7)
EPITH CASTS URNS QL MICRO: ABNORMAL /LPF
ERYTHROCYTE [DISTWIDTH] IN BLOOD BY AUTOMATED COUNT: 13.3 % (ref 11.5–14.5)
EST. AVERAGE GLUCOSE BLD GHB EST-MCNC: 189 MG/DL
FLUAV AG NPH QL IA: NEGATIVE
FLUBV AG NOSE QL IA: NEGATIVE
GAS FLOW.O2 O2 DELIVERY SYS: ABNORMAL L/MIN
GAS FLOW.O2 SETTING OXYMISER: 2 L/M
GLOBULIN SER CALC-MCNC: 3.7 G/DL (ref 2–4)
GLUCOSE BLD STRIP.AUTO-MCNC: 228 MG/DL (ref 65–100)
GLUCOSE SERPL-MCNC: 331 MG/DL (ref 65–100)
GLUCOSE UR STRIP.AUTO-MCNC: 500 MG/DL
HBA1C MFR BLD: 8.2 % (ref 4.2–6.3)
HCO3 BLD-SCNC: 27 MMOL/L (ref 22–26)
HCT VFR BLD AUTO: 36.6 % (ref 35–47)
HGB BLD-MCNC: 11.7 G/DL (ref 11.5–16)
HGB UR QL STRIP: NEGATIVE
IMM GRANULOCYTES # BLD: 0 K/UL (ref 0–0.04)
IMM GRANULOCYTES NFR BLD AUTO: 0 % (ref 0–0.5)
INR PPP: 1 (ref 0.9–1.1)
KETONES UR QL STRIP.AUTO: NEGATIVE MG/DL
LACTATE BLD-SCNC: 2.54 MMOL/L (ref 0.4–2)
LACTATE SERPL-SCNC: 2.1 MMOL/L (ref 0.4–2)
LACTATE SERPL-SCNC: 3.7 MMOL/L (ref 0.4–2)
LEUKOCYTE ESTERASE UR QL STRIP.AUTO: NEGATIVE
LYMPHOCYTES # BLD: 0.5 K/UL (ref 0.8–3.5)
LYMPHOCYTES NFR BLD: 5 % (ref 12–49)
MCH RBC QN AUTO: 28.5 PG (ref 26–34)
MCHC RBC AUTO-ENTMCNC: 32 G/DL (ref 30–36.5)
MCV RBC AUTO: 89.3 FL (ref 80–99)
MONOCYTES # BLD: 0.1 K/UL (ref 0–1)
MONOCYTES NFR BLD: 1 % (ref 5–13)
NEUTS SEG # BLD: 8.4 K/UL (ref 1.8–8)
NEUTS SEG NFR BLD: 94 % (ref 32–75)
NITRITE UR QL STRIP.AUTO: NEGATIVE
NRBC # BLD: 0 K/UL (ref 0–0.01)
NRBC BLD-RTO: 0 PER 100 WBC
PCO2 BLD: 41.8 MMHG (ref 35–45)
PH BLD: 7.42 [PH] (ref 7.35–7.45)
PH UR STRIP: 5.5 [PH] (ref 5–8)
PLATELET # BLD AUTO: 287 K/UL (ref 150–400)
PMV BLD AUTO: 10.6 FL (ref 8.9–12.9)
PO2 BLD: 89 MMHG (ref 80–100)
POTASSIUM SERPL-SCNC: 3.6 MMOL/L (ref 3.5–5.1)
PROT SERPL-MCNC: 7.1 G/DL (ref 6.4–8.2)
PROT UR STRIP-MCNC: NEGATIVE MG/DL
PROTHROMBIN TIME: 10.3 SEC (ref 9–11.1)
RBC # BLD AUTO: 4.1 M/UL (ref 3.8–5.2)
RBC #/AREA URNS HPF: ABNORMAL /HPF (ref 0–5)
RBC MORPH BLD: ABNORMAL
SAMPLES BEING HELD,HOLD: NORMAL
SAO2 % BLD: 97 % (ref 92–97)
SERVICE CMNT-IMP: ABNORMAL
SODIUM SERPL-SCNC: 139 MMOL/L (ref 136–145)
SP GR UR REFRACTOMETRY: >1.03 (ref 1–1.03)
SPECIMEN TYPE: ABNORMAL
THERAPEUTIC RANGE,PTTT: NORMAL SECS (ref 58–77)
TOTAL RESP. RATE, ITRR: 18
TROPONIN I SERPL-MCNC: <0.05 NG/ML
TROPONIN I SERPL-MCNC: <0.05 NG/ML
UA: UC IF INDICATED,UAUC: ABNORMAL
UROBILINOGEN UR QL STRIP.AUTO: 0.2 EU/DL (ref 0.2–1)
WBC # BLD AUTO: 9 K/UL (ref 3.6–11)
WBC URNS QL MICRO: ABNORMAL /HPF (ref 0–4)

## 2018-11-14 PROCEDURE — 94640 AIRWAY INHALATION TREATMENT: CPT

## 2018-11-14 PROCEDURE — 99218 HC RM OBSERVATION: CPT

## 2018-11-14 PROCEDURE — 94664 DEMO&/EVAL PT USE INHALER: CPT

## 2018-11-14 PROCEDURE — 85730 THROMBOPLASTIN TIME PARTIAL: CPT

## 2018-11-14 PROCEDURE — 74011000258 HC RX REV CODE- 258: Performed by: RADIOLOGY

## 2018-11-14 PROCEDURE — 77030029684 HC NEB SM VOL KT MONA -A

## 2018-11-14 PROCEDURE — 82803 BLOOD GASES ANY COMBINATION: CPT

## 2018-11-14 PROCEDURE — 81001 URINALYSIS AUTO W/SCOPE: CPT

## 2018-11-14 PROCEDURE — 74011636320 HC RX REV CODE- 636/320: Performed by: RADIOLOGY

## 2018-11-14 PROCEDURE — 36415 COLL VENOUS BLD VENIPUNCTURE: CPT

## 2018-11-14 PROCEDURE — 96361 HYDRATE IV INFUSION ADD-ON: CPT

## 2018-11-14 PROCEDURE — 84484 ASSAY OF TROPONIN QUANT: CPT

## 2018-11-14 PROCEDURE — 71275 CT ANGIOGRAPHY CHEST: CPT

## 2018-11-14 PROCEDURE — 93005 ELECTROCARDIOGRAM TRACING: CPT

## 2018-11-14 PROCEDURE — 77010033678 HC OXYGEN DAILY

## 2018-11-14 PROCEDURE — 74011250636 HC RX REV CODE- 250/636: Performed by: EMERGENCY MEDICINE

## 2018-11-14 PROCEDURE — 83036 HEMOGLOBIN GLYCOSYLATED A1C: CPT

## 2018-11-14 PROCEDURE — 87040 BLOOD CULTURE FOR BACTERIA: CPT

## 2018-11-14 PROCEDURE — 87804 INFLUENZA ASSAY W/OPTIC: CPT

## 2018-11-14 PROCEDURE — 71046 X-RAY EXAM CHEST 2 VIEWS: CPT

## 2018-11-14 PROCEDURE — 83605 ASSAY OF LACTIC ACID: CPT

## 2018-11-14 PROCEDURE — 74011250636 HC RX REV CODE- 250/636: Performed by: FAMILY MEDICINE

## 2018-11-14 PROCEDURE — 36600 WITHDRAWAL OF ARTERIAL BLOOD: CPT

## 2018-11-14 PROCEDURE — 96365 THER/PROPH/DIAG IV INF INIT: CPT

## 2018-11-14 PROCEDURE — 74011000250 HC RX REV CODE- 250: Performed by: EMERGENCY MEDICINE

## 2018-11-14 PROCEDURE — 96375 TX/PRO/DX INJ NEW DRUG ADDON: CPT

## 2018-11-14 PROCEDURE — 80053 COMPREHEN METABOLIC PANEL: CPT

## 2018-11-14 PROCEDURE — 82962 GLUCOSE BLOOD TEST: CPT

## 2018-11-14 PROCEDURE — 99285 EMERGENCY DEPT VISIT HI MDM: CPT

## 2018-11-14 PROCEDURE — 85610 PROTHROMBIN TIME: CPT

## 2018-11-14 PROCEDURE — 74011250637 HC RX REV CODE- 250/637: Performed by: EMERGENCY MEDICINE

## 2018-11-14 PROCEDURE — 87798 DETECT AGENT NOS DNA AMP: CPT

## 2018-11-14 PROCEDURE — 83880 ASSAY OF NATRIURETIC PEPTIDE: CPT

## 2018-11-14 PROCEDURE — 85025 COMPLETE CBC W/AUTO DIFF WBC: CPT

## 2018-11-14 PROCEDURE — 74011000258 HC RX REV CODE- 258: Performed by: EMERGENCY MEDICINE

## 2018-11-14 RX ORDER — INSULIN LISPRO 100 [IU]/ML
INJECTION, SOLUTION INTRAVENOUS; SUBCUTANEOUS
Status: DISCONTINUED | OUTPATIENT
Start: 2018-11-14 | End: 2018-11-18 | Stop reason: HOSPADM

## 2018-11-14 RX ORDER — SODIUM CHLORIDE 0.9 % (FLUSH) 0.9 %
10 SYRINGE (ML) INJECTION
Status: COMPLETED | OUTPATIENT
Start: 2018-11-14 | End: 2018-11-14

## 2018-11-14 RX ORDER — ARFORMOTEROL TARTRATE 15 UG/2ML
15 SOLUTION RESPIRATORY (INHALATION)
Status: DISCONTINUED | OUTPATIENT
Start: 2018-11-15 | End: 2018-11-18 | Stop reason: HOSPADM

## 2018-11-14 RX ORDER — BUDESONIDE 0.5 MG/2ML
500 INHALANT ORAL
Status: DISCONTINUED | OUTPATIENT
Start: 2018-11-15 | End: 2018-11-14 | Stop reason: SDUPTHER

## 2018-11-14 RX ORDER — HYDROCODONE BITARTRATE AND ACETAMINOPHEN 5; 325 MG/1; MG/1
1 TABLET ORAL
Status: DISCONTINUED | OUTPATIENT
Start: 2018-11-14 | End: 2018-11-18 | Stop reason: HOSPADM

## 2018-11-14 RX ORDER — HEPARIN SODIUM 5000 [USP'U]/ML
5000 INJECTION, SOLUTION INTRAVENOUS; SUBCUTANEOUS EVERY 8 HOURS
Status: COMPLETED | OUTPATIENT
Start: 2018-11-15 | End: 2018-11-16

## 2018-11-14 RX ORDER — SODIUM CHLORIDE 0.9 % (FLUSH) 0.9 %
5-10 SYRINGE (ML) INJECTION AS NEEDED
Status: DISCONTINUED | OUTPATIENT
Start: 2018-11-14 | End: 2018-11-18 | Stop reason: HOSPADM

## 2018-11-14 RX ORDER — FLUTICASONE PROPIONATE AND SALMETEROL 100; 50 UG/1; UG/1
1 POWDER RESPIRATORY (INHALATION) EVERY 12 HOURS
Status: DISCONTINUED | OUTPATIENT
Start: 2018-11-15 | End: 2018-11-14 | Stop reason: CLARIF

## 2018-11-14 RX ORDER — SODIUM CHLORIDE 0.9 % (FLUSH) 0.9 %
5-10 SYRINGE (ML) INJECTION EVERY 8 HOURS
Status: DISCONTINUED | OUTPATIENT
Start: 2018-11-15 | End: 2018-11-18 | Stop reason: HOSPADM

## 2018-11-14 RX ORDER — SODIUM CHLORIDE 9 MG/ML
75 INJECTION, SOLUTION INTRAVENOUS CONTINUOUS
Status: DISCONTINUED | OUTPATIENT
Start: 2018-11-15 | End: 2018-11-17

## 2018-11-14 RX ORDER — DEXTROSE 50 % IN WATER (D50W) INTRAVENOUS SYRINGE
25-50 AS NEEDED
Status: DISCONTINUED | OUTPATIENT
Start: 2018-11-14 | End: 2018-11-18 | Stop reason: HOSPADM

## 2018-11-14 RX ORDER — BUDESONIDE 0.5 MG/2ML
500 INHALANT ORAL
Status: DISCONTINUED | OUTPATIENT
Start: 2018-11-15 | End: 2018-11-18 | Stop reason: HOSPADM

## 2018-11-14 RX ORDER — ESCITALOPRAM OXALATE 10 MG/1
10 TABLET ORAL DAILY
Status: DISCONTINUED | OUTPATIENT
Start: 2018-11-15 | End: 2018-11-18 | Stop reason: HOSPADM

## 2018-11-14 RX ORDER — IBUPROFEN 400 MG/1
400 TABLET ORAL
Status: COMPLETED | OUTPATIENT
Start: 2018-11-14 | End: 2018-11-14

## 2018-11-14 RX ORDER — MAGNESIUM SULFATE 100 %
4 CRYSTALS MISCELLANEOUS AS NEEDED
Status: DISCONTINUED | OUTPATIENT
Start: 2018-11-14 | End: 2018-11-18 | Stop reason: HOSPADM

## 2018-11-14 RX ORDER — IPRATROPIUM BROMIDE AND ALBUTEROL SULFATE 2.5; .5 MG/3ML; MG/3ML
3 SOLUTION RESPIRATORY (INHALATION)
Status: DISCONTINUED | OUTPATIENT
Start: 2018-11-14 | End: 2018-11-15

## 2018-11-14 RX ADMIN — METHYLPREDNISOLONE SODIUM SUCCINATE 125 MG: 125 INJECTION, POWDER, FOR SOLUTION INTRAMUSCULAR; INTRAVENOUS at 21:12

## 2018-11-14 RX ADMIN — SODIUM CHLORIDE 100 ML: 900 INJECTION, SOLUTION INTRAVENOUS at 18:22

## 2018-11-14 RX ADMIN — METHYLPREDNISOLONE SODIUM SUCCINATE 60 MG: 125 INJECTION, POWDER, FOR SOLUTION INTRAMUSCULAR; INTRAVENOUS at 23:54

## 2018-11-14 RX ADMIN — SODIUM CHLORIDE 75 ML/HR: 900 INJECTION, SOLUTION INTRAVENOUS at 23:55

## 2018-11-14 RX ADMIN — IOPAMIDOL 80 ML: 755 INJECTION, SOLUTION INTRAVENOUS at 18:21

## 2018-11-14 RX ADMIN — IBUPROFEN 400 MG: 400 TABLET, FILM COATED ORAL at 18:07

## 2018-11-14 RX ADMIN — CEFTRIAXONE 1 G: 1 INJECTION, POWDER, FOR SOLUTION INTRAMUSCULAR; INTRAVENOUS at 21:13

## 2018-11-14 RX ADMIN — Medication 10 ML: at 23:54

## 2018-11-14 RX ADMIN — SODIUM CHLORIDE 1000 ML: 900 INJECTION, SOLUTION INTRAVENOUS at 18:09

## 2018-11-14 RX ADMIN — Medication 10 ML: at 18:22

## 2018-11-14 RX ADMIN — HEPARIN SODIUM 5000 UNITS: 5000 INJECTION INTRAVENOUS; SUBCUTANEOUS at 23:54

## 2018-11-14 RX ADMIN — ALBUTEROL SULFATE 1 DOSE: 2.5 SOLUTION RESPIRATORY (INHALATION) at 21:30
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
## 2018-11-14 NOTE — ED PROVIDER NOTES
76 y.o. female with past medical history significant for Diverticulitis and Asthma who presents from home with chief complaint of cough. Patient states onset a few days ago of cough that was initially nonproductive but worsened to a productive cough, as well as accompanying chills, fever, and SOB with exertion. Patient states this morning when she got out of the shower having a near syncopal episode with accompanying diaphoresis. Patient was seen at [facility] prior to arrival for symptoms, completed chest XR which was remarkable for pneumonia, received 2 neb treatments with relief, and was referred to [facility] ED for further evaluation. Patient presents to [facility] ED with persisting productive cough, chills, intermittent diaphoresis, and SOB with exertion, but states fever has since resolved. Upon examination, patient has difficulty speaking in full sentences due to SOB. Patient reports using her emergency Proair inhaler for symptoms with no relief. Per triage note, patient reports accompanying left shoulder pain, but did not mention this on exam. Of note, patient states she has been working \"13-14 hour days\" recently. Patient denies the use of supplemental O2 at baseline. Patient denies history of cardiac disease. Patient denies the use of tobacco. Pt denies fever, chest pain, abdominal pain, nausea, vomiting, diarrhea, difficulty with urination or dysuria. There are no other acute medical concerns at this time. Note written by Meenakshi Smalls, as dictated by Des Canada MD 4:41 PM 
 
 
 
 
The history is provided by the patient. Past Medical History:  
Diagnosis Date  Asthma  Gastrointestinal disorder   
 diverticulitis  Ovarian cyst   
 
 
Past Surgical History:  
Procedure Laterality Date  HX APPENDECTOMY  HX CHOLECYSTECTOMY  HX HYSTERECTOMY partial, has ovaries  HX ORTHOPAEDIC    
 tennis elbow History reviewed. No pertinent family history. Social History Socioeconomic History  Marital status:  Spouse name: Not on file  Number of children: Not on file  Years of education: Not on file  Highest education level: Not on file Social Needs  Financial resource strain: Not on file  Food insecurity - worry: Not on file  Food insecurity - inability: Not on file  Transportation needs - medical: Not on file  Transportation needs - non-medical: Not on file Occupational History  Not on file Tobacco Use  Smoking status: Never Smoker  Smokeless tobacco: Never Used Substance and Sexual Activity  Alcohol use: Yes Comment: once per 6 months at social event  Drug use: No  
 Sexual activity: Not on file Other Topics Concern  Not on file Social History Narrative  Not on file ALLERGIES: Garlic and Penicillin g Review of Systems Constitutional: Positive for chills and diaphoresis. Negative for fever. Respiratory: Positive for cough, shortness of breath and wheezing. Cardiovascular: Negative for chest pain. Gastrointestinal: Negative for abdominal pain, diarrhea, nausea and vomiting. Genitourinary: Negative for difficulty urinating and dysuria. All other systems reviewed and are negative. Vitals:  
 11/14/18 1438 11/14/18 1556 BP:  194/86 Pulse: 94 62 Resp:  18 Temp:  98.2 °F (36.8 °C) SpO2: 95% 96% Weight:  103.9 kg (229 lb) Height:  5' 7\" (1.702 m) Physical Exam  
Constitutional: She is oriented to person, place, and time. She appears well-developed and well-nourished. No distress. Shaky from prior neb treatment HENT:  
Head: Normocephalic and atraumatic. Eyes: Conjunctivae are normal. No scleral icterus. Neck: Neck supple. No tracheal deviation present. Cardiovascular: Normal rate, regular rhythm, normal heart sounds and intact distal pulses. Exam reveals no gallop and no friction rub. No murmur heard. Pulmonary/Chest: Effort normal. She has no rales. Expiratory wheezing but no accessory muscle use. Abdominal: Soft. She exhibits no distension. There is no tenderness. There is no rebound and no guarding. Musculoskeletal: No peripheral edema Neurological: She is alert and oriented to person, place, and time. Skin: Skin is warm and dry. No rash noted. Psychiatric: She has a normal mood and affect. Nursing note and vitals reviewed. Note written by Meenakshi Vicente, as dictated by Veronique Puentes MD 4:41 PM 
  
 
MDM Number of Diagnoses or Management Options Amount and/or Complexity of Data Reviewed Clinical lab tests: ordered and reviewed Tests in the radiology section of CPT®: ordered and reviewed Tests in the medicine section of CPT®: ordered and reviewed Discussion of test results with the performing providers: yes Obtain history from someone other than the patient: yes Discuss the patient with other providers: yes Procedures PROGRESS NOTE: 
7:53 PM Believe patient has acute asthma attack and elevated lactate, CT shows no infection or PE. 
 
CONSULT NOTE: 
8:30 PM Veronique Puentes MD communicated with Dr. Tucker Rivera, Consult for Hospitalist via [facility] Text. Discussed available diagnostic tests and clinical findings. Dr. Tucker Rivera will evaluate patient for possible admission. Dr. Tucker Rivera will admit patient.

## 2018-11-15 LAB
ANION GAP SERPL CALC-SCNC: 12 MMOL/L (ref 5–15)
ARTERIAL PATENCY WRIST A: YES
ATRIAL RATE: 68 BPM
ATRIAL RATE: 71 BPM
ATRIAL RATE: 76 BPM
B PERT DNA SPEC QL NAA+PROBE: NOT DETECTED
B-OH-BUTYR SERPL-SCNC: 0.06 MMOL/L
BASE EXCESS BLD CALC-SCNC: 5 MMOL/L
BASOPHILS # BLD: 0 K/UL (ref 0–0.1)
BASOPHILS NFR BLD: 0 % (ref 0–1)
BDY SITE: ABNORMAL
BUN SERPL-MCNC: 20 MG/DL (ref 6–20)
BUN/CREAT SERPL: 22 (ref 12–20)
C PNEUM DNA SPEC QL NAA+PROBE: NOT DETECTED
CALCIUM SERPL-MCNC: 8.3 MG/DL (ref 8.5–10.1)
CALCULATED P AXIS, ECG09: 52 DEGREES
CALCULATED P AXIS, ECG09: 58 DEGREES
CALCULATED P AXIS, ECG09: 70 DEGREES
CALCULATED R AXIS, ECG10: 12 DEGREES
CALCULATED R AXIS, ECG10: 36 DEGREES
CALCULATED R AXIS, ECG10: 40 DEGREES
CALCULATED T AXIS, ECG11: 24 DEGREES
CALCULATED T AXIS, ECG11: 28 DEGREES
CALCULATED T AXIS, ECG11: 32 DEGREES
CHLORIDE SERPL-SCNC: 104 MMOL/L (ref 97–108)
CO2 SERPL-SCNC: 24 MMOL/L (ref 21–32)
CREAT SERPL-MCNC: 0.89 MG/DL (ref 0.55–1.02)
DIAGNOSIS, 93000: NORMAL
DIFFERENTIAL METHOD BLD: ABNORMAL
EOSINOPHIL # BLD: 0 K/UL (ref 0–0.4)
EOSINOPHIL NFR BLD: 0 % (ref 0–7)
ERYTHROCYTE [DISTWIDTH] IN BLOOD BY AUTOMATED COUNT: 13.2 % (ref 11.5–14.5)
FLUAV H1 2009 PAND RNA SPEC QL NAA+PROBE: NOT DETECTED
FLUAV H1 RNA SPEC QL NAA+PROBE: NOT DETECTED
FLUAV H3 RNA SPEC QL NAA+PROBE: NOT DETECTED
FLUAV SUBTYP SPEC NAA+PROBE: NOT DETECTED
FLUBV RNA SPEC QL NAA+PROBE: NOT DETECTED
GAS FLOW.O2 O2 DELIVERY SYS: ABNORMAL L/MIN
GLUCOSE BLD STRIP.AUTO-MCNC: 219 MG/DL (ref 65–100)
GLUCOSE BLD STRIP.AUTO-MCNC: 247 MG/DL (ref 65–100)
GLUCOSE BLD STRIP.AUTO-MCNC: 325 MG/DL (ref 65–100)
GLUCOSE SERPL-MCNC: 305 MG/DL (ref 65–100)
HADV DNA SPEC QL NAA+PROBE: NOT DETECTED
HCO3 BLD-SCNC: 29 MMOL/L (ref 22–26)
HCOV 229E RNA SPEC QL NAA+PROBE: NOT DETECTED
HCOV HKU1 RNA SPEC QL NAA+PROBE: NOT DETECTED
HCOV NL63 RNA SPEC QL NAA+PROBE: NOT DETECTED
HCOV OC43 RNA SPEC QL NAA+PROBE: NOT DETECTED
HCT VFR BLD AUTO: 33.2 % (ref 35–47)
HGB BLD-MCNC: 10.8 G/DL (ref 11.5–16)
HMPV RNA SPEC QL NAA+PROBE: NOT DETECTED
HPIV1 RNA SPEC QL NAA+PROBE: NOT DETECTED
HPIV2 RNA SPEC QL NAA+PROBE: NOT DETECTED
HPIV3 RNA SPEC QL NAA+PROBE: NOT DETECTED
HPIV4 RNA SPEC QL NAA+PROBE: NOT DETECTED
IMM GRANULOCYTES # BLD: 0.1 K/UL (ref 0–0.04)
IMM GRANULOCYTES NFR BLD AUTO: 1 % (ref 0–0.5)
LACTATE SERPL-SCNC: 1.4 MMOL/L (ref 0.4–2)
LACTATE SERPL-SCNC: 4.8 MMOL/L (ref 0.4–2)
LACTATE SERPL-SCNC: 5.1 MMOL/L (ref 0.4–2)
LYMPHOCYTES # BLD: 0.5 K/UL (ref 0.8–3.5)
LYMPHOCYTES NFR BLD: 8 % (ref 12–49)
M PNEUMO DNA SPEC QL NAA+PROBE: NOT DETECTED
MCH RBC QN AUTO: 29.2 PG (ref 26–34)
MCHC RBC AUTO-ENTMCNC: 32.5 G/DL (ref 30–36.5)
MCV RBC AUTO: 89.7 FL (ref 80–99)
MONOCYTES # BLD: 0 K/UL (ref 0–1)
MONOCYTES NFR BLD: 0 % (ref 5–13)
NEUTS SEG # BLD: 6 K/UL (ref 1.8–8)
NEUTS SEG NFR BLD: 91 % (ref 32–75)
NRBC # BLD: 0 K/UL (ref 0–0.01)
NRBC BLD-RTO: 0 PER 100 WBC
P-R INTERVAL, ECG05: 154 MS
P-R INTERVAL, ECG05: 166 MS
P-R INTERVAL, ECG05: 172 MS
PCO2 BLD: 43.1 MMHG (ref 35–45)
PH BLD: 7.44 [PH] (ref 7.35–7.45)
PLATELET # BLD AUTO: 268 K/UL (ref 150–400)
PMV BLD AUTO: 10.5 FL (ref 8.9–12.9)
PO2 BLD: 67 MMHG (ref 80–100)
POTASSIUM SERPL-SCNC: 3.7 MMOL/L (ref 3.5–5.1)
Q-T INTERVAL, ECG07: 426 MS
Q-T INTERVAL, ECG07: 430 MS
Q-T INTERVAL, ECG07: 446 MS
QRS DURATION, ECG06: 100 MS
QRS DURATION, ECG06: 104 MS
QRS DURATION, ECG06: 98 MS
QTC CALCULATION (BEZET), ECG08: 462 MS
QTC CALCULATION (BEZET), ECG08: 474 MS
QTC CALCULATION (BEZET), ECG08: 483 MS
RBC # BLD AUTO: 3.7 M/UL (ref 3.8–5.2)
RBC MORPH BLD: ABNORMAL
RSV RNA SPEC QL NAA+PROBE: NOT DETECTED
RV+EV RNA SPEC QL NAA+PROBE: NOT DETECTED
SAO2 % BLD: 94 % (ref 92–97)
SERVICE CMNT-IMP: ABNORMAL
SODIUM SERPL-SCNC: 140 MMOL/L (ref 136–145)
SPECIMEN TYPE: ABNORMAL
TROPONIN I SERPL-MCNC: <0.05 NG/ML
TROPONIN I SERPL-MCNC: <0.05 NG/ML
VENTRICULAR RATE, ECG03: 68 BPM
VENTRICULAR RATE, ECG03: 71 BPM
VENTRICULAR RATE, ECG03: 76 BPM
WBC # BLD AUTO: 6.6 K/UL (ref 3.6–11)

## 2018-11-15 PROCEDURE — 83605 ASSAY OF LACTIC ACID: CPT

## 2018-11-15 PROCEDURE — 74011250636 HC RX REV CODE- 250/636: Performed by: HOSPITALIST

## 2018-11-15 PROCEDURE — 94640 AIRWAY INHALATION TREATMENT: CPT

## 2018-11-15 PROCEDURE — 85025 COMPLETE CBC W/AUTO DIFF WBC: CPT

## 2018-11-15 PROCEDURE — 82010 KETONE BODYS QUAN: CPT

## 2018-11-15 PROCEDURE — 84484 ASSAY OF TROPONIN QUANT: CPT

## 2018-11-15 PROCEDURE — 93005 ELECTROCARDIOGRAM TRACING: CPT

## 2018-11-15 PROCEDURE — 77030029684 HC NEB SM VOL KT MONA -A

## 2018-11-15 PROCEDURE — 99218 HC RM OBSERVATION: CPT

## 2018-11-15 PROCEDURE — 82803 BLOOD GASES ANY COMBINATION: CPT

## 2018-11-15 PROCEDURE — 74011000250 HC RX REV CODE- 250: Performed by: HOSPITALIST

## 2018-11-15 PROCEDURE — 36600 WITHDRAWAL OF ARTERIAL BLOOD: CPT

## 2018-11-15 PROCEDURE — 77010033678 HC OXYGEN DAILY

## 2018-11-15 PROCEDURE — 80048 BASIC METABOLIC PNL TOTAL CA: CPT

## 2018-11-15 PROCEDURE — 36415 COLL VENOUS BLD VENIPUNCTURE: CPT

## 2018-11-15 PROCEDURE — 93306 TTE W/DOPPLER COMPLETE: CPT

## 2018-11-15 PROCEDURE — 74011250637 HC RX REV CODE- 250/637: Performed by: FAMILY MEDICINE

## 2018-11-15 PROCEDURE — 74011250636 HC RX REV CODE- 250/636: Performed by: INTERNAL MEDICINE

## 2018-11-15 PROCEDURE — 74011250636 HC RX REV CODE- 250/636: Performed by: FAMILY MEDICINE

## 2018-11-15 PROCEDURE — 82962 GLUCOSE BLOOD TEST: CPT

## 2018-11-15 PROCEDURE — 74011636637 HC RX REV CODE- 636/637: Performed by: FAMILY MEDICINE

## 2018-11-15 RX ORDER — BISMUTH SUBSALICYLATE 262 MG
1 TABLET,CHEWABLE ORAL DAILY
COMMUNITY

## 2018-11-15 RX ORDER — IPRATROPIUM BROMIDE AND ALBUTEROL SULFATE 2.5; .5 MG/3ML; MG/3ML
3 SOLUTION RESPIRATORY (INHALATION)
Status: DISCONTINUED | OUTPATIENT
Start: 2018-11-15 | End: 2018-11-17

## 2018-11-15 RX ADMIN — Medication 10 ML: at 15:19

## 2018-11-15 RX ADMIN — ESCITALOPRAM OXALATE 10 MG: 10 TABLET ORAL at 09:05

## 2018-11-15 RX ADMIN — HYDROCODONE BITARTRATE AND ACETAMINOPHEN 1 TABLET: 5; 325 TABLET ORAL at 17:02

## 2018-11-15 RX ADMIN — SODIUM CHLORIDE 250 ML: 900 INJECTION, SOLUTION INTRAVENOUS at 06:10

## 2018-11-15 RX ADMIN — SODIUM CHLORIDE 1.5 ML: 9 INJECTION INTRAMUSCULAR; INTRAVENOUS; SUBCUTANEOUS at 11:09

## 2018-11-15 RX ADMIN — IPRATROPIUM BROMIDE AND ALBUTEROL SULFATE 3 ML: .5; 3 SOLUTION RESPIRATORY (INHALATION) at 16:21

## 2018-11-15 RX ADMIN — HEPARIN SODIUM 5000 UNITS: 5000 INJECTION INTRAVENOUS; SUBCUTANEOUS at 09:05

## 2018-11-15 RX ADMIN — INSULIN LISPRO 4 UNITS: 100 INJECTION, SOLUTION INTRAVENOUS; SUBCUTANEOUS at 06:32

## 2018-11-15 RX ADMIN — AZITHROMYCIN MONOHYDRATE 500 MG: 500 INJECTION, POWDER, LYOPHILIZED, FOR SOLUTION INTRAVENOUS at 09:05

## 2018-11-15 RX ADMIN — SODIUM CHLORIDE 75 ML/HR: 900 INJECTION, SOLUTION INTRAVENOUS at 18:17

## 2018-11-15 RX ADMIN — INSULIN LISPRO 2 UNITS: 100 INJECTION, SOLUTION INTRAVENOUS; SUBCUTANEOUS at 19:17

## 2018-11-15 RX ADMIN — INSULIN LISPRO 2 UNITS: 100 INJECTION, SOLUTION INTRAVENOUS; SUBCUTANEOUS at 12:57

## 2018-11-15 RX ADMIN — METHYLPREDNISOLONE SODIUM SUCCINATE 60 MG: 125 INJECTION, POWDER, FOR SOLUTION INTRAMUSCULAR; INTRAVENOUS at 15:19

## 2018-11-15 RX ADMIN — METHYLPREDNISOLONE SODIUM SUCCINATE 60 MG: 125 INJECTION, POWDER, FOR SOLUTION INTRAMUSCULAR; INTRAVENOUS at 06:09

## 2018-11-15 RX ADMIN — Medication 10 ML: at 06:10

## 2018-11-15 RX ADMIN — HEPARIN SODIUM 5000 UNITS: 5000 INJECTION INTRAVENOUS; SUBCUTANEOUS at 17:02

## 2018-11-15 NOTE — H&P
[address]  HISTORY AND PHYSICAL [place] 
MR#: [id number] : 1950 ACCOUNT #: [de-identified] ADMIT DATE: 2018 CHIEF COMPLAINT:  Shortness of breath. HISTORY OF PRESENT ILLNESS:  The patient is a 51-year-old female with past medical history of diverticulitis, history of mild intermittent asthma who presents to the hospital.  Patient reports that about 5 days back, she started feeling weak, fatigued, felt like Flaca Jorge has been beaten up with a pipe\", thought she got the flu and started feeling not well. Her friend, who is sitting at the bedside, reports that there was a rhinovirus going around in the office and she suspects that the patient caught the same; regardless, patient reports that about 3 days back she starting getting increased shortness of breath associated with mild cough. She also started wheezing. Patient reports she had no energy, felt quite weak, quite lethargic, felt that she had no strength and decided to come to the hospital today. Patient reports that she was seen at [facility], had a chest x-ray done which was concerning for pneumonia and was given 2 neb treatments for relief and was referred to [facility] ER for further management and evaluation. The patient reports that today when she went to take a shower, she felt so weak that she felt that she was going to pass out. It was hard for her to get from the bathroom to her bed. She got concerned and decided to come to the hospital.  Patient, on arrival, in the ER had difficulty speaking in full sentences due to shortness of breath. Patient denies any other complaints or problems. The patient denies any headache, blurry vision, sore throat, trouble swallowing, trouble with speech.   Denies any chest pain, abdominal pain, constipation, diarrhea, urinary symptoms, focal or generalized neurological weakness, recent travel, sick contacts, falls, injuries, hematemesis, melena, hemoptysis, hematuria or any major concerns or problems. PAST MEDICAL HISTORY:  See above. HOME MEDICATIONS:  Currently the patient is on ibuprofen p.r.n., Norco 5/325 mg every 4 hours for relief of pain, Lexapro 10 mg every day, albuterol p.r.n., hydrochlorothiazide 25 mg daily, Advair Diskus 1 puff b.i.d. SOCIAL HISTORY:  Denies tobacco abuse. Occasional alcohol. Denies IV drug abuse. ALLERGIES:  GARLIC AND PENICILLIN. FAMILY HISTORY:  Discussed and found to be noncontributory. REVIEW OF SYSTEMS:  Negative except for the symptoms mentioned above. PHYSICAL EXAMINATION: 
VITAL SIGNS:  Temperature 98.3, pulse 69, respiration rate 18, blood pressure 154/72, pulse ox 96% on 2 liters. GENERAL:  Alert and oriented x3, awake, mildly distressed, pleasant male, appears to be stated age. HEENT:  Pupils equal, reactive to light. Dry mucous membranes. Tympanic membranes clear. NECK:  Supple. CHEST:  Diffuse wheezes throughout the lung fields. CARDIOVASCULAR:  S1, S2 are heard. ABDOMEN:  Soft, nontender, nondistended. Bowel sounds are physiologic. EXTREMITIES:  No clubbing, no cyanosis, no edema. NEUROPSYCHIATRIC:  Pleasant mood and affect. Cranial nerves II-XII grossly intact. Sensory grossly within normal limits. DTRs are +2-4. Strength 5/5. SKIN:  Warm. LABORATORY DATA:  White count 9.0, hemoglobin 11.7, hematocrit 36.6, platelets 520. INR 1.0. Sodium 139, potassium 3.6, chloride 100, bicarbonate 29, anion gap 10, glucose 331, BUN 22, creatinine 0.94, calcium 8.6, bilirubin total 0.3, ALT 20, AST 12, alkaline phosphatase 84, lactic acid 3.7 which came down to 2.5. Troponin less than 0.04. . Blood cultures are pending. CT of the chest shows negative for PE, cardiomegaly, no evidence of acute process. X-ray of the chest shows no acute abnormality. EKG shows normal sinus rhythm.  
 
ASSESSMENT AND PLAN: 
 1.  Shortness of breath, possibly asthma exacerbation with superimposed upper respiratory infection. The patient will be admitted on a telemetry bed. Will start patient on IV antibiotics, IV steroids, schedule DuoNeb, oxygen support and IV steroids. Will provide oxygen support. Will get an ABG and continue to closely monitor. No obvious signs of pneumonia. Influenza A and B were not done in the ER, which have been ordered and also get a respiratory PCR panel. Further intervention will be per hospital course. Continue to closely monitor. Reassess as needed. May consider getting a pulmonary consult if symptoms persist.  Will get peak flow eval done. 2.  Hyperglycemia, unclear etiology. Will get a hemoglobin A1c. Put patient on sliding scale NovoLog insulin, Accu-Cheks, diet control and continue to monitor. Patient does not have any history of diabetes. Further intervention per hospital course. Will continue to closely monitor and reassess as needed. 3.  Elevated lactic acidosis, unclear etiology. No obvious source of infection. Will repeat lactate within 4 hours. The patient received IV fluids. Will provide antibiotics and will continue to closely monitor. Further intervention will be per hospital course. Will reassess as needed. Will get an ABG. 3.  Gastrointestinal and deep venous thrombosis prophylaxis. Patient will be on sequential compression devices. Mariah Vivar MD MM/ILSA 
D: 11/14/2018 21:32    
T: 11/14/2018 22:50 
JOB #: [id number]

## 2018-11-15 NOTE — PROGRESS NOTES
TRANSFER - IN REPORT: 
 
Verbal report received from Kailey Stephen (name) on Cuauhtemoc Calvo  being received from ED (unit) for routine progression of care Report consisted of patients Situation, Background, Assessment and  
Recommendations(SBAR). Information from the following report(s) SBAR, ED Summary, Intake/Output, MAR and Recent Results was reviewed with the receiving nurse. Opportunity for questions and clarification was provided. Assessment completed upon patients arrival to unit and care assumed. Primary Nurse Naveed Villagran and Brian Najera RN performed a dual skin assessment on this patient No impairment noted Damian score is 20 EKG completed, Pt appeared to arrive in NSR, converted to Afib and sinus arrhythmia, then back to NSR Dr. Tanika Kong called about critical lactics, repeat completed, new order for a repeat in 6 hr (noon) ordered, 250 ml bolus and ABG 
 
0600: Respiratory called for breathing treatments and ABG Bedside and Verbal shift change report given to [facility] (oncoming nurse) by Katrin Olivares (offgoing nurse). Report included the following information SBAR, Kardex, ED Summary, Procedure Summary, Intake/Output, MAR and Recent Results.

## 2018-11-15 NOTE — ED NOTES
TRANSFER - OUT REPORT: 
 
Verbal report given to Schuyler Juan RN (name) on Madhu Palma  being transferred to [facility] (unit) for routine progression of care Report consisted of patients Situation, Background, Assessment and  
Recommendations(SBAR). Information from the following report(s) SBAR, ED Summary and Recent Results was reviewed with the receiving nurse. Lines:  
Peripheral IV 11/14/18 Left Antecubital (Active) Opportunity for questions and clarification was provided. Patient transported with: 
 Monitor O2 @ 2 liters Registered Nurse

## 2018-11-15 NOTE — PROGRESS NOTES
Care Management Interventions Transition of Care Consult (CM Consult): (Nne) Siat Signup: No 
Discharge Durable Medical Equipment: No 
Physical Therapy Consult: No 
Occupational Therapy Consult: No 
Speech Therapy Consult: No 
Current Support Network: Lives Alone Confirm Follow Up Transport: Family Plan discussed with Pt/Family/Caregiver: Yes Freedom of Choice Offered: (N/A) Discharge Location Discharge Placement: Home Reason for Admission:   Shortness of breath RRAT Score:            8 Plan for utilizing home health:      Not Applicable Likelihood of Readmission:  Low Transition of Care Plan:                  CM met with patient. Patient lives alone. Patient reports good family /social support. Patient is ambulatory and expects return to independent functioning. Patient is now stable on room air. Patient confirmed PCP ([facility]), health insurance, and prescription coverage. Transport at discharge will be provided by family or friend. Patient said that she has had persistent billing problems since her admissions in 2016 and 2017 with description indicating coordination of benefits issues. CM noted that current chart has Medicare as primary and commercial insurance as secondary which patient said is definitely an error. CM observed that this may be a longer term clerical error. CM spoke with Nisreen Tinsley [facility] [phone], who said that she would facilitate patient communication with a  to address the patient's concerns.

## 2018-11-15 NOTE — PROGRESS NOTES
NUTRITION COMPLETE ASSESSMENT 
 
RECOMMENDATIONS:  
1. Encourage compliance with consistent CHO diet - 3 regular meals with snacks as needed 2. Monitor BG with medication adjustments per DTC recommendations 3. Weekly weights Interventions/Plan:  
Nutrition Education:(DM diet) Assessment:  
Reason for Assessment: [x]BPA/MST Referral  
 
Diet: Cardiac, Consistent carb Supplements: none Nutritionally Significant Medications: [x] Reviewed & Includes: azithromycin, ceftriaxone, SSI, solu-medrol IV, NS @ 75ml/hr Pre-Hospitalization: 
Usual Appetite: Poor Diet at Home: regular Current Hospitalization:  
Appetite: Poor PO Ability: Independent Average po intake: Average supplements intake:    
 Subjective: \"I don't do sweets but I love my fried chicken, mashed potatoes, pasta, bread, bread, bread. \" 
 
Objective: 
Pt admitted for PNA. PMHx: diverticulitis, asthma. SOB and fatigue on admit with possible asthma exacerbation on top of respiratory infection. Abx rx. Hyperglycemia on admit with A1C 8.2% with steroids rx - no previous hx of diabetes. DTC following. Pt reports being told she had pre-diabetes last year. Does not eat many \"sweets\" but admits to eating a lot of starchy foods. Does mostly chicken/lean meats and does include a lot os non-starchy vegetables as well. She has done some of her own research into diabetic diet but reinforced basic principles. Pleasantly declining handouts at this time. Recommend consult for new DM dx education and [facility] for reinforcement. Pt motivated to make changes. Wt loss prior to admit likely d/t poor appetite with infection, as well as poorly controlled BG. Predict with abx and as condition improve appetite will increase. Wt Readings:  
11/15/18 105.9 kg (233 lb 7.5 oz) 10/16/18 109.1 kg (240 lb 8.4 oz) 10/28/17 99.3 kg (219 lb) 08/03/17 103.9 kg (229 lb) Will continue to follow for further reinforcement of DM education, po intake, BG, and wt trends. Estimated Nutrition Needs:  
Kcals/day: 6650 Kcals/day(1435-1685kcal) Protein: 85 g(0.8g/kg') Fluid: 1700 ml(1ml/kcal) Based On: [place](x 1.2 - (250-500kcal) for gradual wt loss) Weight Used: Actual wt(105.9kg) Pt expected to meet estimated nutrient needs:  []   Yes     []  No [x] Unable to predict at this time Nutrition Diagnosis:  
1. Unintended weight loss related to poor BG control?,  as evidenced by new dx of DM; 3% wt loss x 1 month;  
Goals:   
 Adherence to DM diet; Consumption of 3 regular meals per day Monitoring & Evaluation: - Total energy intake, Carbohydrate intake - Weight/weight change, Glucose profile Previous Nutrition Goals Met:   N/A Previous Recommendations:    N/A Education & Discharge Needs: 
 [] None Identified 
 [x] Identified and addressed  
 [] Participated in care plan, discharge planning, and/or interdisciplinary rounds Cultural, Mormon and ethnic food preferences identified: None Skin Integrity: [x]Intact  []Other Edema: [x]None []Other Last BM: PTA Food Allergies: []None [x]Other: garlic Diet Restrictions: Cultural/Christianity Preference(s): None Anthropometrics:   
Weight Loss Metrics 11/15/2018 10/16/2018 10/28/2017 8/3/2017 6/30/2017 8/7/2016 7/11/2014 Today's Wt 233 lb 7.5 oz 240 lb 8.4 oz 219 lb 229 lb 229 lb 229 lb 11.5 oz 236 lb 15.9 oz  
BMI 36.57 kg/m2 37.67 kg/m2 34.3 kg/m2 36.96 kg/m2 36.41 kg/m2 36.53 kg/m2 36.04 kg/m2 Weight Source: Standing scale (comment) Height: 5' 7\" (170.2 cm), Body mass index is 36.57 kg/m². IBW : 61.2 kg (135 lb), % IBW (Calculated): 172.94 % Usual Body Weight: 108.9 kg (240 lb),   
 
Labs:   
Lab Results Component Value Date/Time  Sodium 140 11/15/2018 03:18 AM  
 Potassium 3.7 11/15/2018 03:18 AM  
 Chloride 104 11/15/2018 03:18 AM  
 CO2 24 11/15/2018 03:18 AM  
 Glucose 305 (H) 11/15/2018 03:18 AM  
 BUN 20 11/15/2018 03:18 AM  
 Creatinine 0.89 11/15/2018 03:18 AM  
 Calcium 8.3 (L) 11/15/2018 03:18 AM  
 Magnesium 1.8 08/07/2016 04:17 AM  
 Phosphorus 3.7 08/07/2016 04:17 AM  
 Albumin 3.4 (L) 11/14/2018 04:37 PM  
 
Lab Results Component Value Date/Time Hemoglobin A1c 8.2 (H) 11/14/2018 09:46 PM  
 
Evelyn Henderson, RD Pager #[phone] or [phone]

## 2018-11-15 NOTE — DIABETES MGMT
DTC Progress Note Recommendations/ Comments: Chart review for extreme hyperglycemia BG' s ranged 225 mg/dL - 325 mg/dL. Pt refused lispro correction at bedtime yesterday when  mg/dL; FBG today 325 mg/dL On steroids Methyl prednisone 60 mg Q 8 hrs Pt has no hx DM; however, A1c of 8.2% indicative of diabetes If appropriate, please consider  
-Addition of Lantus 15 daily while on steroids; may consider oral agent once off steroids 
-change lispro to normal sensitivity correction scale while on steroids (now on high sensitivity) Messaged Dr Zainab Navarro regarding above recommendations and concern about new dx DM Current hospital DM medication: lispro correction scale, high sensitivity Chart reviewed on [facility]. 
 
Patient is a 76 y.o. female with no history DM  
A1c in 2016 was 6.7% indicative of DM and A1c higher now 8.2% A1c:  
Lab Results Component Value Date/Time Hemoglobin A1c 8.2 (H) 11/14/2018 09:46 PM  
 Hemoglobin A1c 6.7 (H) 08/04/2016 10:30 AM  
 
 
Recent Glucose Results:  
Lab Results Component Value Date/Time  (H) 11/15/2018 03:18 AM  
  (H) 11/14/2018 04:37 PM  
 GLUCPOC 247 (H) 11/15/2018 11:53 AM  
 GLUCPOC 325 (H) 11/15/2018 06:21 AM  
 GLUCPOC 228 (H) 11/14/2018 11:42 PM  
  
 
Lab Results Component Value Date/Time Creatinine 0.89 11/15/2018 03:18 AM  
 
Estimated Creatinine Clearance: 75.7 mL/min (based on SCr of 0.89 mg/dL). Active Orders Diet DIET CARDIAC Regular; Consistent Carb 1800kcal  
  
 
PO intake: No data found. Will continue to follow as needed. Thank you Ambar eVlez RN, CDE Pager [phone]

## 2018-11-15 NOTE — PROGRESS NOTES
Problem: Diabetes Self-Management Goal: *Monitoring blood glucose, interpreting and using results Identify recommended blood glucose targets  and personal targets. Outcome: Progressing Towards Goal 
Pt has new onset of diabetes. Pt has been educated on a diabetic diet and knows to limit intake of starches and sugar. Pt is tolerating RA. Continuing to monitor. Problem: Falls - Risk of 
Goal: *Absence of Falls Document Debbie Deal Fall Risk and appropriate interventions in the flowsheet. Outcome: Progressing Towards Goal 
Fall Risk Interventions: 
Mobility Interventions: Communicate number of staff needed for ambulation/transfer, Patient to call before getting OOB Medication Interventions: Teach patient to arise slowly, Patient to call before getting OOB, Evaluate medications/consider consulting pharmacy Elimination Interventions: Call light in reach, Patient to call for help with toileting needs, Toilet paper/wipes in reach History of Falls Interventions: Door open when patient unattended, Evaluate medications/consider consulting pharmacy, Room close to nurse's station 1800: Notified Dr. Nabeel Johnson that pt has new onset of sharp pain that pt rates 7/10 and is located under her left breast.  Pt had an aching chest pain that is worsened by breathing on admission. Per MD, will do and EKG and check the pts troponin level. Will continue to monitor. Put pt on 2 LNC as she said she was feeling short of breath. 2030: Attempted to call report, was put on hold. 2100: TRANSFER - OUT REPORT: 
 
Verbal report given to Linda(name) on Adrianne Shayne  being transferred to [facility](unit) for routine progression of care Report consisted of patients Situation, Background, Assessment and  
Recommendations(SBAR). Information from the following report(s) SBAR, ED Summary, Intake/Output, MAR, Recent Results, Med Rec Status and Cardiac Rhythm NSR was reviewed with the receiving nurse. Lines: Peripheral IV 11/14/18 Left Antecubital (Active) Site Assessment Clean, dry, & intact 11/15/2018  4:00 PM  
Phlebitis Assessment 0 11/15/2018  4:00 PM  
Infiltration Assessment 0 11/15/2018  4:00 PM  
Dressing Status Clean, dry, & intact 11/15/2018  4:00 PM  
Dressing Type Transparent;Tape 11/15/2018  4:00 PM  
Hub Color/Line Status Pink;Capped 11/15/2018  4:00 PM  
Action Taken Open ports on tubing capped 11/15/2018  4:00 PM  
Alcohol Cap Used Yes 11/15/2018  4:00 PM  
  
 
Opportunity for questions and clarification was provided. Patient transported with: 
Pt belongings Oxygen Pt chart

## 2018-11-16 LAB
ANION GAP SERPL CALC-SCNC: 8 MMOL/L (ref 5–15)
ATRIAL RATE: 65 BPM
BUN SERPL-MCNC: 23 MG/DL (ref 6–20)
BUN/CREAT SERPL: 33 (ref 12–20)
CALCIUM SERPL-MCNC: 8.1 MG/DL (ref 8.5–10.1)
CALCULATED P AXIS, ECG09: 23 DEGREES
CALCULATED R AXIS, ECG10: 22 DEGREES
CALCULATED T AXIS, ECG11: 22 DEGREES
CHLORIDE SERPL-SCNC: 104 MMOL/L (ref 97–108)
CO2 SERPL-SCNC: 26 MMOL/L (ref 21–32)
CREAT SERPL-MCNC: 0.69 MG/DL (ref 0.55–1.02)
DIAGNOSIS, 93000: NORMAL
ERYTHROCYTE [DISTWIDTH] IN BLOOD BY AUTOMATED COUNT: 13.2 % (ref 11.5–14.5)
GLUCOSE BLD STRIP.AUTO-MCNC: 171 MG/DL (ref 65–100)
GLUCOSE BLD STRIP.AUTO-MCNC: 230 MG/DL (ref 65–100)
GLUCOSE BLD STRIP.AUTO-MCNC: 245 MG/DL (ref 65–100)
GLUCOSE BLD STRIP.AUTO-MCNC: 263 MG/DL (ref 65–100)
GLUCOSE BLD STRIP.AUTO-MCNC: 277 MG/DL (ref 65–100)
GLUCOSE SERPL-MCNC: 246 MG/DL (ref 65–100)
HCT VFR BLD AUTO: 30.9 % (ref 35–47)
HGB BLD-MCNC: 9.9 G/DL (ref 11.5–16)
LACTATE SERPL-SCNC: 2 MMOL/L (ref 0.4–2)
MAGNESIUM SERPL-MCNC: 2.3 MG/DL (ref 1.6–2.4)
MCH RBC QN AUTO: 28.2 PG (ref 26–34)
MCHC RBC AUTO-ENTMCNC: 32 G/DL (ref 30–36.5)
MCV RBC AUTO: 88 FL (ref 80–99)
NRBC # BLD: 0 K/UL (ref 0–0.01)
NRBC BLD-RTO: 0 PER 100 WBC
P-R INTERVAL, ECG05: 136 MS
PLATELET # BLD AUTO: 279 K/UL (ref 150–400)
PMV BLD AUTO: 10.6 FL (ref 8.9–12.9)
POTASSIUM SERPL-SCNC: 3.9 MMOL/L (ref 3.5–5.1)
Q-T INTERVAL, ECG07: 446 MS
QRS DURATION, ECG06: 100 MS
QTC CALCULATION (BEZET), ECG08: 463 MS
RBC # BLD AUTO: 3.51 M/UL (ref 3.8–5.2)
SERVICE CMNT-IMP: ABNORMAL
SODIUM SERPL-SCNC: 138 MMOL/L (ref 136–145)
TROPONIN I SERPL-MCNC: <0.05 NG/ML
TSH SERPL DL<=0.05 MIU/L-ACNC: 1.17 UIU/ML (ref 0.36–3.74)
VENTRICULAR RATE, ECG03: 65 BPM
WBC # BLD AUTO: 10.7 K/UL (ref 3.6–11)

## 2018-11-16 PROCEDURE — 83605 ASSAY OF LACTIC ACID: CPT

## 2018-11-16 PROCEDURE — 77030029684 HC NEB SM VOL KT MONA -A

## 2018-11-16 PROCEDURE — 99218 HC RM OBSERVATION: CPT

## 2018-11-16 PROCEDURE — 80048 BASIC METABOLIC PNL TOTAL CA: CPT

## 2018-11-16 PROCEDURE — 94640 AIRWAY INHALATION TREATMENT: CPT

## 2018-11-16 PROCEDURE — 94664 DEMO&/EVAL PT USE INHALER: CPT

## 2018-11-16 PROCEDURE — 74011000258 HC RX REV CODE- 258: Performed by: HOSPITALIST

## 2018-11-16 PROCEDURE — 74011000250 HC RX REV CODE- 250: Performed by: HOSPITALIST

## 2018-11-16 PROCEDURE — 93005 ELECTROCARDIOGRAM TRACING: CPT

## 2018-11-16 PROCEDURE — 84484 ASSAY OF TROPONIN QUANT: CPT

## 2018-11-16 PROCEDURE — 74011250637 HC RX REV CODE- 250/637: Performed by: HOSPITALIST

## 2018-11-16 PROCEDURE — 77010033678 HC OXYGEN DAILY

## 2018-11-16 PROCEDURE — 85027 COMPLETE CBC AUTOMATED: CPT

## 2018-11-16 PROCEDURE — 74011250637 HC RX REV CODE- 250/637: Performed by: PHYSICIAN ASSISTANT

## 2018-11-16 PROCEDURE — 74011250636 HC RX REV CODE- 250/636: Performed by: PHYSICIAN ASSISTANT

## 2018-11-16 PROCEDURE — 74011636637 HC RX REV CODE- 636/637: Performed by: FAMILY MEDICINE

## 2018-11-16 PROCEDURE — 36415 COLL VENOUS BLD VENIPUNCTURE: CPT

## 2018-11-16 PROCEDURE — 83735 ASSAY OF MAGNESIUM: CPT

## 2018-11-16 PROCEDURE — 74011250636 HC RX REV CODE- 250/636: Performed by: FAMILY MEDICINE

## 2018-11-16 PROCEDURE — 84443 ASSAY THYROID STIM HORMONE: CPT

## 2018-11-16 PROCEDURE — 74011250636 HC RX REV CODE- 250/636: Performed by: HOSPITALIST

## 2018-11-16 PROCEDURE — 74011250637 HC RX REV CODE- 250/637: Performed by: FAMILY MEDICINE

## 2018-11-16 PROCEDURE — 74011000250 HC RX REV CODE- 250: Performed by: FAMILY MEDICINE

## 2018-11-16 PROCEDURE — 82962 GLUCOSE BLOOD TEST: CPT

## 2018-11-16 RX ORDER — DILTIAZEM HYDROCHLORIDE 180 MG/1
180 CAPSULE, COATED, EXTENDED RELEASE ORAL DAILY
Status: DISCONTINUED | OUTPATIENT
Start: 2018-11-16 | End: 2018-11-17

## 2018-11-16 RX ORDER — HYDROCHLOROTHIAZIDE 25 MG/1
25 TABLET ORAL DAILY
Status: DISCONTINUED | OUTPATIENT
Start: 2018-11-16 | End: 2018-11-18 | Stop reason: HOSPADM

## 2018-11-16 RX ADMIN — METHYLPREDNISOLONE SODIUM SUCCINATE 40 MG: 40 INJECTION, POWDER, FOR SOLUTION INTRAMUSCULAR; INTRAVENOUS at 00:06

## 2018-11-16 RX ADMIN — AZITHROMYCIN MONOHYDRATE 500 MG: 500 INJECTION, POWDER, LYOPHILIZED, FOR SOLUTION INTRAVENOUS at 07:42

## 2018-11-16 RX ADMIN — HEPARIN SODIUM 5000 UNITS: 5000 INJECTION INTRAVENOUS; SUBCUTANEOUS at 15:16

## 2018-11-16 RX ADMIN — SODIUM CHLORIDE 75 ML/HR: 900 INJECTION, SOLUTION INTRAVENOUS at 17:01

## 2018-11-16 RX ADMIN — METHYLPREDNISOLONE SODIUM SUCCINATE 40 MG: 40 INJECTION, POWDER, FOR SOLUTION INTRAMUSCULAR; INTRAVENOUS at 07:51

## 2018-11-16 RX ADMIN — HYDROCODONE BITARTRATE AND ACETAMINOPHEN 1 TABLET: 5; 325 TABLET ORAL at 08:57

## 2018-11-16 RX ADMIN — HEPARIN SODIUM 5000 UNITS: 5000 INJECTION INTRAVENOUS; SUBCUTANEOUS at 00:06

## 2018-11-16 RX ADMIN — BUDESONIDE 500 MCG: 0.5 INHALANT RESPIRATORY (INHALATION) at 21:08

## 2018-11-16 RX ADMIN — IPRATROPIUM BROMIDE AND ALBUTEROL SULFATE 3 ML: .5; 3 SOLUTION RESPIRATORY (INHALATION) at 01:26

## 2018-11-16 RX ADMIN — INSULIN LISPRO 3 UNITS: 100 INJECTION, SOLUTION INTRAVENOUS; SUBCUTANEOUS at 12:39

## 2018-11-16 RX ADMIN — INSULIN LISPRO 1 UNITS: 100 INJECTION, SOLUTION INTRAVENOUS; SUBCUTANEOUS at 01:21

## 2018-11-16 RX ADMIN — Medication 10 ML: at 22:21

## 2018-11-16 RX ADMIN — HYDROCODONE BITARTRATE AND ACETAMINOPHEN 1 TABLET: 5; 325 TABLET ORAL at 00:09

## 2018-11-16 RX ADMIN — HYDROCODONE BITARTRATE AND ACETAMINOPHEN 1 TABLET: 5; 325 TABLET ORAL at 05:15

## 2018-11-16 RX ADMIN — INSULIN LISPRO 3 UNITS: 100 INJECTION, SOLUTION INTRAVENOUS; SUBCUTANEOUS at 07:44

## 2018-11-16 RX ADMIN — ARFORMOTEROL TARTRATE 15 MCG: 15 SOLUTION RESPIRATORY (INHALATION) at 21:08

## 2018-11-16 RX ADMIN — Medication 10 ML: at 07:46

## 2018-11-16 RX ADMIN — BUDESONIDE 500 MCG: 0.5 INHALANT RESPIRATORY (INHALATION) at 08:45

## 2018-11-16 RX ADMIN — ARFORMOTEROL TARTRATE 15 MCG: 15 SOLUTION RESPIRATORY (INHALATION) at 08:45

## 2018-11-16 RX ADMIN — INSULIN LISPRO 2 UNITS: 100 INJECTION, SOLUTION INTRAVENOUS; SUBCUTANEOUS at 16:49

## 2018-11-16 RX ADMIN — METHYLPREDNISOLONE SODIUM SUCCINATE 40 MG: 40 INJECTION, POWDER, FOR SOLUTION INTRAMUSCULAR; INTRAVENOUS at 22:21

## 2018-11-16 RX ADMIN — ESCITALOPRAM OXALATE 10 MG: 10 TABLET ORAL at 08:58

## 2018-11-16 RX ADMIN — Medication 10 ML: at 00:19

## 2018-11-16 RX ADMIN — HYDROCHLOROTHIAZIDE 25 MG: 25 TABLET ORAL at 09:00

## 2018-11-16 RX ADMIN — CEFTRIAXONE 1 G: 1 INJECTION, POWDER, FOR SOLUTION INTRAMUSCULAR; INTRAVENOUS at 22:21

## 2018-11-16 RX ADMIN — Medication 10 ML: at 15:16

## 2018-11-16 RX ADMIN — CEFTRIAXONE 1 G: 1 INJECTION, POWDER, FOR SOLUTION INTRAMUSCULAR; INTRAVENOUS at 00:42

## 2018-11-16 RX ADMIN — DILTIAZEM HYDROCHLORIDE 180 MG: 180 CAPSULE, COATED, EXTENDED RELEASE ORAL at 16:49

## 2018-11-16 RX ADMIN — HEPARIN SODIUM 5000 UNITS: 5000 INJECTION INTRAVENOUS; SUBCUTANEOUS at 07:42

## 2018-11-16 NOTE — DIABETES MGMT
DTC Consult Note Recommendations/ Comments: BG results > 200 mg/dL. On steroids - Methyl Prednisone tapered to 40 mg Q 12 hours. New dx DM If appropriate, please consider  
-change lispro correction scale to resistant 
-noted plan to begin Metformin on discharge 
-could consider Lantus 20 units daily while hospitalized and on steroids On discharge will need Rx for One Touch Verio test strips and Delica lancets Current hospital DM medication: lispro high sensitivity correction scale Consult received for:  []             Assessment of home management 
              []      Medication Recommendations []             Meter/monitoring 
   []             Insulin instruction [x]             New diagnosis []             Outpatient education []             Insulin pump patient []             Insulin infusion 
   []             DKA/HHS Chart reviewed and initial evaluation complete on Ap Richmond. 
Patient is a 76 y.o. female with new dx DM. Met with pt - she states \"There was mention of higher BG's a while back. My father (she was adopted) had Type 1 DM\" I know I did this to myself. My   2 years ago and I turned to food; I don't like sweets but I know I eat way too much. I know what I do wrong and what I need to do to change\". Pt receptive to education. . 
Assessed and instructed patient on the following:  
· interpretation of lab results, she was familiar with the A1c test. Explained result of 8.2 equivalent to AV  mg/dL% · blood sugar goals explained,  
· complications of diabetes mellitus,  
· exercise,  
· medication - likely need for rmedicaiton, · Effect of steroid on BG  
· SMBG skills, she is willing to test. Given a One Touch Verio meter and instructed in its use · Nutrition - she does not drink sweet beverages and does not lek sweets. She openly admits to loving starches and eating too much\". She reads labels. Instructed in meal planning using plate method and carb booklet for portion control and appropriate carbs · referred to Diabetes Educator · She goes to [facility] but plans to find a PCP Encouraged the following: · Follow meal plan · Test BG 2x/day · Take medication as prescribed · Provided patient with the following: [x]             Survival skills education materials []             Insulin education materials []             CHO counting education materials [x]             Outpatient DTC contact number 
             [x]             Glucometer - One Touch Verio meter Patient was able to give return demonstration of 
  [x]       Glucometer One Touch Verio 
  []       saline injection  
   with []     without []       assistance needed. []       Nurse to have patient self inject prior to discharge. Discussed with patient and/or family need for follow up appointment for diabetes management after discharge. A1c:  
Lab Results Component Value Date/Time Hemoglobin A1c 8.2 (H) 11/14/2018 09:46 PM  
 
 
Recent Glucose Results:  
Lab Results Component Value Date/Time  (H) 11/16/2018 01:30 AM  
 GLUCPOC 277 (H) 11/16/2018 11:25 AM  
 GLUCPOC 263 (H) 11/16/2018 06:15 AM  
 GLUCPOC 245 (H) 11/16/2018 12:55 AM  
  
 
Lab Results Component Value Date/Time Creatinine 0.69 11/16/2018 01:30 AM  
 
Estimated Creatinine Clearance: 98.4 mL/min (based on SCr of 0.69 mg/dL). Active Orders Diet DIET CARDIAC Regular; Consistent Carb 1800kcal  
  
 
PO intake: No data found. Will continue to follow as needed. Thank you. Ana Brito RN, CDE Pager [phone]

## 2018-11-16 NOTE — PROGRESS NOTES
Hospitalist Progress Note Ramez Munoz MD 
Office: [phone] Date of Service:  2018 NAME:  Aisha Kern 
:  1950 MRN:  [id number] Admission Summary:  
 The patient is a 69-year-old female with past medical history of diverticulitis, history of mild intermittent asthma who presents to the hospital.  Patient reports that about 5 days back, she started feeling weak, fatigued, felt like Christophe Denis has been beaten up with a pipe\", thought she got the flu and started feeling not well C/c sob. Interval history / Subjective:  
  Her breathing is better but still feels very weak, and lightheaded. Lactic acid of 5 this morning. , This AM she had a.flutter on monitor, palpitations. Changed to tele floor, consulted cards. Assessment & Plan:  
 
Shortness of breath, possibly asthma exacerbation with superimposed upper respiratory infection, Respiratory panel, flu, RSV negative, BCx negative so far. Will cont with IV steroids, nebs. Added ceftriaxone. ABGs showed hypoxia. -CTA no PE. Still has wheezing on exam 
-ECHO: normal EF. -HTN: started HCTZ home med. A.flutter: with palpitations and presyncope like symptoms, had ECHO on admission. Will get TSH. Cards consulted. Lactic acidosis: of 5.1 this AM. Ceftriaxone added, normal LA., f/u on cultures. UA negative. Will repeat CXR in AM. Hyperglycemia: A1c of 8, new DM, diabetic education, SSI, decreased dose of IV steroids. Carb diet, at DC will start on Metformin. Not started now because of lactic acidosis. PT consulted. Code status: full DVT prophylaxis: SQ heparin. Care Plan discussed with: Patient/Family and Nurse Disposition: TBD Hospital Problems  Date Reviewed: 2018 Codes Class Noted POA * (Principal) SOB (shortness of breath) ICD-10-CM: R06.02 
ICD-9-CM: 786.05  2018 Unknown Review of Systems: A comprehensive review of systems was negative except for that written in the HPI. Vital Signs:  
 Last 24hrs VS reviewed since prior progress note. Most recent are: 
Visit Vitals /75 (BP 1 Location: Left arm, BP Patient Position: At rest;Supine) Pulse (!) 54 Temp 97.9 °F (36.6 °C) Resp 16 Ht 5' 7\" (1.702 m) Wt 107.4 kg (236 lb 12.4 oz) SpO2 96% Breastfeeding? No  
BMI 37.08 kg/m² No intake or output data in the 24 hours ending 11/16/18 1440 Physical Examination:  
 
 
     
Constitutional:  No acute distress, cooperative, pleasant   
ENT:  Oral mucous moist, oropharynx benign. Neck supple, Resp:   Wheezing bilaterally, decreased respiratory effort. No accessory muscle use CV:  Regular rhythm, normal rate, no murmurs, gallops, rubs GI:  Soft, non distended, non tender. normoactive bowel sounds, no hepatosplenomegaly Musculoskeletal:  No edema, warm, 2+ pulses throughout Neurologic:  Moves all extremities. AAOx3, CN II-XII reviewed Data Review:  
 Review and/or order of clinical lab test 
Review and/or order of tests in the radiology section of CPT Review and/or order of tests in the medicine section of CPT Labs:  
 
Recent Labs 11/16/18 
0130 11/15/18 
6963 WBC 10.7 6.6 HGB 9.9* 10.8* HCT 30.9* 33.2*  
 268 Recent Labs 11/16/18 
0130 11/15/18 
0318 11/14/18 
1637  140 139  
K 3.9 3.7 3.6  104 100 CO2 26 24 29 BUN 23* 20 22* CREA 0.69 0.89 0.94 * 305* 331* CA 8.1* 8.3* 8.6 MG 2.3  --   --   
 
Recent Labs 11/14/18 
1637 SGOT 12* ALT 20 AP 84 TBILI 0.3 TP 7.1 ALB 3.4*  
GLOB 3.7 Recent Labs 11/14/18 
1637 INR 1.0 PTP 10.3 APTT 28.2 No results for input(s): FE, TIBC, PSAT, FERR in the last 72 hours. No results found for: FOL, RBCF No results for input(s): PH, PCO2, PO2 in the last 72 hours. Recent Labs 11/16/18 
1013 11/15/18 
1840 11/15/18 
8087 TROIQ <0.05 <0.05 <0.05 No results found for: CHOL, CHOLX, CHLST, CHOLV, HDL, LDL, LDLC, DLDLP, TGLX, TRIGL, TRIGP, CHHD, CHHDX Lab Results Component Value Date/Time Glucose (POC) 277 (H) 11/16/2018 11:25 AM  
 Glucose (POC) 263 (H) 11/16/2018 06:15 AM  
 Glucose (POC) 245 (H) 11/16/2018 12:55 AM  
 Glucose (POC) 219 (H) 11/15/2018 06:20 PM  
 Glucose (POC) 247 (H) 11/15/2018 11:53 AM  
 
Lab Results Component Value Date/Time Color YELLOW/STRAW 11/14/2018 07:54 PM  
 Appearance CLEAR 11/14/2018 07:54 PM  
 Specific gravity >1.030 (H) 11/14/2018 07:54 PM  
 pH (UA) 5.5 11/14/2018 07:54 PM  
 Protein NEGATIVE  11/14/2018 07:54 PM  
 Glucose 500 (A) 11/14/2018 07:54 PM  
 Ketone NEGATIVE  11/14/2018 07:54 PM  
 Bilirubin NEGATIVE  11/14/2018 07:54 PM  
 Urobilinogen 0.2 11/14/2018 07:54 PM  
 Nitrites NEGATIVE  11/14/2018 07:54 PM  
 Leukocyte Esterase NEGATIVE  11/14/2018 07:54 PM  
 Epithelial cells FEW 11/14/2018 07:54 PM  
 Bacteria NEGATIVE  11/14/2018 07:54 PM  
 WBC 0-4 11/14/2018 07:54 PM  
 RBC 0-5 11/14/2018 07:54 PM  
 
 
 
Medications Reviewed:  
 
Current Facility-Administered Medications Medication Dose Route Frequency  hydroCHLOROthiazide (HYDRODIURIL) tablet 25 mg  25 mg Oral DAILY  methylPREDNISolone (PF) (SOLU-MEDROL) injection 40 mg  40 mg IntraVENous Q12H  
 influenza vaccine 2018-19 (6 mos+)(PF) (FLUARIX QUAD/FLULAVAL QUAD) injection 0.5 mL  0.5 mL IntraMUSCular PRIOR TO DISCHARGE  cefTRIAXone (ROCEPHIN) 1 g in 0.9% sodium chloride (MBP/ADV) 50 mL  1 g IntraVENous Q24H  
 albuterol-ipratropium (DUO-NEB) 2.5 MG-0.5 MG/3 ML  3 mL Nebulization Q4H PRN  
 escitalopram oxalate (LEXAPRO) tablet 10 mg  10 mg Oral DAILY  HYDROcodone-acetaminophen (NORCO) 5-325 mg per tablet 1 Tab  1 Tab Oral Q4H PRN  
 sodium chloride (NS) flush 5-10 mL  5-10 mL IntraVENous Q8H  
 sodium chloride (NS) flush 5-10 mL  5-10 mL IntraVENous PRN  
  0.9% sodium chloride infusion  75 mL/hr IntraVENous CONTINUOUS  
 azithromycin (ZITHROMAX) 500 mg in 0.9% sodium chloride 250 mL ([id number])  500 mg IntraVENous Q24H  
 heparin (porcine) injection 5,000 Units  5,000 Units SubCUTAneous Q8H  
 glucose chewable tablet 16 g  4 Tab Oral PRN  
 dextrose (D50W) injection syrg 12.5-25 g  25-50 mL IntraVENous PRN  
 glucagon (GLUCAGEN) injection 1 mg  1 mg IntraMUSCular PRN  
 insulin lispro (HUMALOG) injection   SubCUTAneous AC&HS  
 arformoterol (BROVANA) neb solution 15 mcg  15 mcg Nebulization BID RT And  
 budesonide (PULMICORT) 500 mcg/2 ml nebulizer suspension  500 mcg Nebulization BID RT  
 
______________________________________________________________________ EXPECTED LENGTH OF STAY: - - - 
ACTUAL LENGTH OF STAY:          0 Kelton Tan MD

## 2018-11-16 NOTE — CONSULTS
Cardiology Consult Note      Patient Name: Ap Richmond  : 1950 MRN: [id number]  Date: 2018  Time: 1:56 PM  Admit Diagnosis: SOB (shortness of breath)  Primary Cardiologist: none  Consulting Cardiologist: Nisreen Marin M.D.  Noé Rodriguez for Consult: arrhythmia   Requesting MD: Shelley Starkey MD    HPI:  Ap Richmond is a 76 y.o. female admitted on 2018  for SOB (shortness of breath). has a past medical history of Asthma, Gastrointestinal disorder, and Ovarian cyst.   Assessment/Plan/Discussion:Cardiology Attending:     Patient seen on the day of progress note and examined  and agree with Advance Practice Provider (DECLAN, NP,PA)  assessment and plans. Ap Richmond is a 76 y.o. female   Nice active lady with near syncope likely related to acute pulmonary illness  Found to have flutter with RVR with similar symptoms to near syncope  Has new DM2  Elevated CHADS-Vasc score  Start [facility], diltiazem  Cautious use of any beta blocker , consider coreg if needed  Goal is eliminate PAflutter and lower risk for CVA  Dr Rigoberto Andres will see over weekend and I will fu in office in 10 days if discharged over weekend  Yaya Aiken MD          Presented to the ED for syncopal episode with resulted in her falling to her knees and almost blacking out. She did feel palpitations during this time. She is a good historian. She reports this has never happened before. She also reports having no heart issues. She does admit to asthma. She also c/o having a cough and feeling poorly with associated fevers, chills for days prior to presenting. Associated SOB and pleuritic CP. CXR in ED was normal.    Today she was noted to have a 9 second run of \"Vfib\" on remote telemetry. This was at 9 am this morning. She notes during this time she felt as she did before when almost blacking outa nd palpitations. Lightheadedness and SOB. The episode was self terminating. She has since been transferred to [facility]. No h/o smoking or ETOH use. She was a high school and . She also was a  and continued to [facility] in shape\". She has since fallen off her exercising. Admitting to liking food too much. Subjective:  Denies CP or SOB. Dry, tight cough noted. Currently no palpitations      Assessment and Plan     1. PAF/flutter with RVR - Not VFib   - Echo EF 55-60%, NRWMA, LA dilated. Otherwise normal   - Check TSH   - Avoid BB with asthma exacerbation, on IV steroids   - Will start PO dilt  2. SOB with asthma exacerbation   - Nebs and IV steroids   - per Primary team  3. Hyperglycemia   - A1c 8.2   - DM II   - per Primary team    Telemetry personally reviewed. Aflutter with RVR to 190. Echo normal as above. Will start PO dilt as BB needs to be avoided with Asthma exacerbation. Also will start Eliquis with elevated CHaDsVasc score.         Review of Systems:     GENERAL   Recent weight loss - no   Fever -----------------   no   Chills -----------------   no     EYES, VISION   Visual Changes - no     EARS, NOSE, THROAT   Hearing loss ----------- no   Swallowing difficulties - no     CARDIOVASCULAR   Chest pain/pressure ---- no   Arrhythmia/palpitations - no       RESPIRATORY   Cough ------------------ no   Shortness of breath - no   Wheezing -------------- no   GASTROINTESTINAL   Abdominal pain - no   Heartburn -------- no   Bloody stool ----- no     GENITOURINARY   Frequent urination - no   Urgency -------------- no     MUSCULOSKELETAL   Joint pain/swelling ---- no   Musculoskeletal pain - no     SKIN & INTEGUMENTARY   Rashes - no   Sores --- no         NEUROLOGICAL   Numbness/tingling - no   Sensation loss ------ no     PSYCHIATRIC   Nervousness/anxiety - no   Depression -------------- no     ENDOCRINE   Heat/cold intolerance - no   Excessive thirst -------- no     HEMATOLOGIC/LYMPHATIC   Abnormal bleeding - no     ALL/IMMUN   Allergic reaction ------ no Recurrent infections - no     Previous treatment/evaluation includes echocardiogram .  Cardiac risk factors: diabetes mellitus, obesity, hypertension, post-menopausal.    Past Medical History:   Diagnosis Date    Asthma     Gastrointestinal disorder     diverticulitis    Ovarian cyst      Past Surgical History:   Procedure Laterality Date    HX APPENDECTOMY      HX CHOLECYSTECTOMY      HX HYSTERECTOMY      partial, has ovaries    HX ORTHOPAEDIC      tennis elbow     Current Facility-Administered Medications   Medication Dose Route Frequency    hydroCHLOROthiazide (HYDRODIURIL) tablet 25 mg  25 mg Oral DAILY    methylPREDNISolone (PF) (SOLU-MEDROL) injection 40 mg  40 mg IntraVENous Q12H    influenza vaccine 2018-19 (6 mos+)(PF) (FLUARIX QUAD/FLULAVAL QUAD) injection 0.5 mL  0.5 mL IntraMUSCular PRIOR TO DISCHARGE    cefTRIAXone (ROCEPHIN) 1 g in 0.9% sodium chloride (MBP/ADV) 50 mL  1 g IntraVENous Q24H    albuterol-ipratropium (DUO-NEB) 2.5 MG-0.5 MG/3 ML  3 mL Nebulization Q4H PRN    escitalopram oxalate (LEXAPRO) tablet 10 mg  10 mg Oral DAILY    HYDROcodone-acetaminophen (NORCO) 5-325 mg per tablet 1 Tab  1 Tab Oral Q4H PRN    sodium chloride (NS) flush 5-10 mL  5-10 mL IntraVENous Q8H    sodium chloride (NS) flush 5-10 mL  5-10 mL IntraVENous PRN    0.9% sodium chloride infusion  75 mL/hr IntraVENous CONTINUOUS    azithromycin (ZITHROMAX) 500 mg in 0.9% sodium chloride 250 mL (Urae7Hms)  500 mg IntraVENous Q24H    heparin (porcine) injection 5,000 Units  5,000 Units SubCUTAneous Q8H    glucose chewable tablet 16 g  4 Tab Oral PRN    dextrose (D50W) injection syrg 12.5-25 g  25-50 mL IntraVENous PRN    glucagon (GLUCAGEN) injection 1 mg  1 mg IntraMUSCular PRN    insulin lispro (HUMALOG) injection   SubCUTAneous AC&HS    arformoterol (BROVANA) neb solution 15 mcg  15 mcg Nebulization BID RT    And    budesonide (PULMICORT) 500 mcg/2 ml nebulizer suspension  500 mcg Nebulization BID RT Allergies   Allergen Reactions    Garlic Hives    Penicillin G Hives      History reviewed. No pertinent family history. Social History     Socioeconomic History    Marital status:      Spouse name: Not on file    Number of children: Not on file    Years of education: Not on file    Highest education level: Not on file   Social Needs    Financial resource strain: Not on file    Food insecurity - worry: Not on file    Food insecurity - inability: Not on file    Transportation needs - medical: Not on file    Transportation needs - non-medical: Not on file   Occupational History    Not on file   Tobacco Use    Smoking status: Never Smoker    Smokeless tobacco: Never Used   Substance and Sexual Activity    Alcohol use: Yes     Comment: once per 6 months at social event    Drug use: No    Sexual activity: Not on file   Other Topics Concern    Not on file   Social History Narrative    Not on file       Objective:    Physical Exam    Vitals:   Vitals:    11/16/18 0816 11/16/18 0845 11/16/18 1029 11/16/18 1127   BP: 183/73  149/69 165/75   Pulse: (!) 58  61 (!) 54   Resp: 20  16 16   Temp: 97.4 °F (36.3 °C)  97.9 °F (36.6 °C) 97.9 °F (36.6 °C)   SpO2: 94% 96% 93% 96%   Weight:   236 lb 12.4 oz (107.4 kg)    Height:           General:    Alert, cooperative, no distress, appears stated age. Neck:   Supple, no carotid bruit and no JVD. Back:     Symmetric, normal curvature. Lungs:     End exp wheeze to auscultation bilaterally. Heart[de-identified]    Regular rate and rhythm, S1, S2 normal, no murmur, click, rub or gallop. Abdomen:     Soft, non-tender. Bowel sounds normal.    Extremities:   Extremities normal, atraumatic, no cyanosis or edema. Vascular:   Pulses - 2+ radials   Skin:   Skin color normal. No rashes or lesions   Neurologic:   CN II-XII grossly intact.         Telemetry:     Aflutter, variable, with         ECG:   EKG Results     Procedure 720 Value Units Date/Time    EKG, 12 LEAD, INITIAL [[id number]] Collected:  11/16/18 1001    Order Status:  Completed Updated:  11/16/18 1200     Ventricular Rate 65 BPM      Atrial Rate 65 BPM      P-R Interval 136 ms      QRS Duration 100 ms      Q-T Interval 446 ms      QTC Calculation (Bezet) 463 ms      Calculated P Axis 23 degrees      Calculated R Axis 22 degrees      Calculated T Axis 22 degrees      Diagnosis --     Sinus rhythm with marked sinus arrhythmia  Otherwise normal ECG  When compared with ECG of 15-NOV-2018 18:24,  No significant change was found  Confirmed by Mary Jane Figueroa MD. ([id number]) on 11/16/2018 12:00:09 PM      EKG, 12 LEAD, INITIAL [[id number]] Collected:  11/15/18 1824    Order Status:  Completed Updated:  11/15/18 2045     Ventricular Rate 76 BPM      Atrial Rate 76 BPM      P-R Interval 154 ms      QRS Duration 104 ms      Q-T Interval 430 ms      QTC Calculation (Bezet) 483 ms      Calculated P Axis 58 degrees      Calculated R Axis 40 degrees      Calculated T Axis 24 degrees      Diagnosis --     Normal sinus rhythm  Confirmed by Huang London M.D., Kylie Ray ([id number]) on 11/15/2018 8:45:32 PM      EKG, 12 LEAD, INITIAL [[id number]] Collected:  11/15/18 0449    Order Status:  Completed Updated:  11/15/18 1228     Ventricular Rate 71 BPM      Atrial Rate 71 BPM      P-R Interval 172 ms      QRS Duration 100 ms      Q-T Interval 426 ms      QTC Calculation (Bezet) 462 ms      Calculated P Axis 52 degrees      Calculated R Axis 36 degrees      Calculated T Axis 32 degrees      Diagnosis --     Normal sinus rhythm  When compared with ECG of 15-NOV-2018 04:49,  No significant change was found  Confirmed by Mary Jane Figueroa MD. ([id number]) on 11/15/2018 12:28:37 PM      EKG, 12 LEAD, INITIAL [[id number]] Collected:  11/14/18 1711    Order Status:  Completed Updated:  11/15/18 1222     Ventricular Rate 68 BPM      Atrial Rate 68 BPM      P-R Interval 166 ms      QRS Duration 98 ms      Q-T Interval 446 ms      QTC Calculation (Bezet) 474 ms      Calculated P Axis 70 degrees      Calculated R Axis 12 degrees      Calculated T Axis 28 degrees      Diagnosis --     Normal sinus rhythm  When compared with ECG of 16-OCT-2018 17:21,  premature atrial complexes are no longer present  Confirmed by Eric Richardson MD. ([id number]) on 11/15/2018 12:22:13 PM              Data Review:     Radiology:   XR Results (most recent):  Results from [place] encounter on 11/14/18   XR CHEST PA LAT    Narrative Chest PA and lateral    History: Cough. Rule out pneumonia    Comparison: 3/19/2017    Findings: The lungs are well expanded. No focal consolidation, pleural  effusion, or pneumothorax. The heart is mildly enlarged, but unchanged. Multilevel mild spondylosis. Impression Impression:  No acute cardiopulmonary process. Recent Labs     11/16/18  1013 11/15/18  1840 11/15/18  0318   TROIQ <0.05 <0.05 <0.05     Recent Labs     11/16/18  0130 11/15/18  0318    140   K 3.9 3.7    104   CO2 26 24   BUN 23* 20   CREA 0.69 0.89   * 305*   CA 8.1* 8.3*     Recent Labs     11/16/18  0130 11/15/18  0318   WBC 10.7 6.6   HGB 9.9* 10.8*   HCT 30.9* 33.2*    268     Recent Labs     11/14/18  1637   PTP 10.3   INR 1.0   SGOT 12*   AP 84     No results for input(s): CHOL, LDLC in the last 72 hours. No lab exists for component: TGL, HDLC,  HBA1C  No results for input(s): CRP, TSH, TSHEXT in the last 72 hours. No lab exists for component: ESR    Karlos Olvera. [facility] Moment Siva Williamson M.D. [facility] [address] [place] [address] [address] [facility] [address]     [place], [place]     [phone]    CC: Other, MD Sophy

## 2018-11-16 NOTE — PROGRESS NOTES
0825: Paged Dr. Diego Aleman regarding the patient having a blood pressure of 183/73 and no BP medications ordered. 0900: Spoke with Dr. Diego Aleman and she instructed me to order hydrochlorothiazide 25 mg PO Daily. 0915: Received a call from the monitor techFrancisco, that the patient had a run of vfib in the 190s. Paged Dr. Diego Aleman. 56: Spoke with Dr. Diego Aleman and she ordered an EKG, troponin and lactic acid lab draws, and transfer to a telemetry unit. 1030: Received a call back from the monitor tech, Francisco Nettles, and she stated she had read the strip wrong and the patient was actually in vtach and a flutter in the 190s and it lasted 6 seconds.

## 2018-11-16 NOTE — PROGRESS NOTES
Problem: Falls - Risk of 
Goal: *Absence of Falls Document Alicia Heart Fall Risk and appropriate interventions in the flowsheet. Outcome: Progressing Towards Goal 
Fall Risk Interventions: 
Mobility Interventions: Communicate number of staff needed for ambulation/transfer, Patient to call before getting OOB Medication Interventions: Assess postural VS orthostatic hypotension, Evaluate medications/consider consulting pharmacy, Patient to call before getting OOB, Teach patient to arise slowly Elimination Interventions: Call light in reach, Patient to call for help with toileting needs History of Falls Interventions: Bed/chair exit alarm Bedside and Verbal shift change report given to Allan Kramer RN (oncoming nurse) by Clover Nelson RN (offgoing nurse). Report included the following information SBAR, Kardex, Recent Results and Cardiac Rhythm NSR.

## 2018-11-16 NOTE — PROGRESS NOTES
TRANSFER - OUT REPORT: 
 
Verbal report given to Bess Garrido RN (name) on Ligia Nassar  being transferred to [facility]) for change in patient condition(runs of vtach and a flutter) Report consisted of patients Situation, Background, Assessment and  
Recommendations(SBAR). Information from the following report(s) SBAR, Kardex, Procedure Summary, Intake/Output, MAR and Recent Results was reviewed with the receiving nurse. Lines:  
Peripheral IV 11/14/18 Left Antecubital (Active) Site Assessment Clean, dry, & intact 11/16/2018  8:58 AM  
Phlebitis Assessment 0 11/16/2018  8:58 AM  
Infiltration Assessment 0 11/16/2018  8:58 AM  
Dressing Status Clean, dry, & intact 11/16/2018  8:58 AM  
Dressing Type Transparent;Tape 11/16/2018  8:58 AM  
Hub Color/Line Status Pink; Infusing 11/16/2018  8:58 AM  
Action Taken Open ports on tubing capped 11/15/2018 10:00 PM  
Alcohol Cap Used Yes 11/16/2018  8:58 AM  
  
 
Opportunity for questions and clarification was provided. Patient transported with: 
 Monitor Registered Nurse

## 2018-11-16 NOTE — PROGRESS NOTES
Physical therapy: 
 
Orders received and chart reviewed. Patient transferred to [facility] for a-flutter and v-tach. Will hold therapy until pt is stable. Thank you Bob Banks, PT, DPT

## 2018-11-17 LAB
ANION GAP SERPL CALC-SCNC: 8 MMOL/L (ref 5–15)
BUN SERPL-MCNC: 23 MG/DL (ref 6–20)
BUN/CREAT SERPL: 32 (ref 12–20)
CALCIUM SERPL-MCNC: 8.6 MG/DL (ref 8.5–10.1)
CHLORIDE SERPL-SCNC: 101 MMOL/L (ref 97–108)
CO2 SERPL-SCNC: 28 MMOL/L (ref 21–32)
CREAT SERPL-MCNC: 0.71 MG/DL (ref 0.55–1.02)
ERYTHROCYTE [DISTWIDTH] IN BLOOD BY AUTOMATED COUNT: 13.2 % (ref 11.5–14.5)
GLUCOSE BLD STRIP.AUTO-MCNC: 221 MG/DL (ref 65–100)
GLUCOSE BLD STRIP.AUTO-MCNC: 231 MG/DL (ref 65–100)
GLUCOSE BLD STRIP.AUTO-MCNC: 278 MG/DL (ref 65–100)
GLUCOSE BLD STRIP.AUTO-MCNC: 285 MG/DL (ref 65–100)
GLUCOSE BLD STRIP.AUTO-MCNC: 292 MG/DL (ref 65–100)
GLUCOSE SERPL-MCNC: 277 MG/DL (ref 65–100)
HCT VFR BLD AUTO: 32.6 % (ref 35–47)
HGB BLD-MCNC: 10.4 G/DL (ref 11.5–16)
MCH RBC QN AUTO: 28.7 PG (ref 26–34)
MCHC RBC AUTO-ENTMCNC: 31.9 G/DL (ref 30–36.5)
MCV RBC AUTO: 89.8 FL (ref 80–99)
NRBC # BLD: 0 K/UL (ref 0–0.01)
NRBC BLD-RTO: 0 PER 100 WBC
PLATELET # BLD AUTO: 276 K/UL (ref 150–400)
PMV BLD AUTO: 10.8 FL (ref 8.9–12.9)
POTASSIUM SERPL-SCNC: 4 MMOL/L (ref 3.5–5.1)
RBC # BLD AUTO: 3.63 M/UL (ref 3.8–5.2)
SERVICE CMNT-IMP: ABNORMAL
SODIUM SERPL-SCNC: 137 MMOL/L (ref 136–145)
WBC # BLD AUTO: 7.3 K/UL (ref 3.6–11)

## 2018-11-17 PROCEDURE — 94640 AIRWAY INHALATION TREATMENT: CPT

## 2018-11-17 PROCEDURE — G8978 MOBILITY CURRENT STATUS: HCPCS

## 2018-11-17 PROCEDURE — 74011250637 HC RX REV CODE- 250/637: Performed by: INTERNAL MEDICINE

## 2018-11-17 PROCEDURE — 74011000250 HC RX REV CODE- 250: Performed by: FAMILY MEDICINE

## 2018-11-17 PROCEDURE — 97161 PT EVAL LOW COMPLEX 20 MIN: CPT

## 2018-11-17 PROCEDURE — G8979 MOBILITY GOAL STATUS: HCPCS

## 2018-11-17 PROCEDURE — 74011636637 HC RX REV CODE- 636/637: Performed by: FAMILY MEDICINE

## 2018-11-17 PROCEDURE — G8980 MOBILITY D/C STATUS: HCPCS

## 2018-11-17 PROCEDURE — 74011250636 HC RX REV CODE- 250/636: Performed by: FAMILY MEDICINE

## 2018-11-17 PROCEDURE — 80048 BASIC METABOLIC PNL TOTAL CA: CPT

## 2018-11-17 PROCEDURE — 65660000000 HC RM CCU STEPDOWN

## 2018-11-17 PROCEDURE — 36415 COLL VENOUS BLD VENIPUNCTURE: CPT

## 2018-11-17 PROCEDURE — 74011000250 HC RX REV CODE- 250: Performed by: HOSPITALIST

## 2018-11-17 PROCEDURE — 74011250637 HC RX REV CODE- 250/637: Performed by: PHYSICIAN ASSISTANT

## 2018-11-17 PROCEDURE — 99218 HC RM OBSERVATION: CPT

## 2018-11-17 PROCEDURE — 74011250637 HC RX REV CODE- 250/637: Performed by: HOSPITALIST

## 2018-11-17 PROCEDURE — 74011250636 HC RX REV CODE- 250/636: Performed by: HOSPITALIST

## 2018-11-17 PROCEDURE — 82962 GLUCOSE BLOOD TEST: CPT

## 2018-11-17 PROCEDURE — 85027 COMPLETE CBC AUTOMATED: CPT

## 2018-11-17 PROCEDURE — 74011000258 HC RX REV CODE- 258: Performed by: HOSPITALIST

## 2018-11-17 PROCEDURE — 97116 GAIT TRAINING THERAPY: CPT

## 2018-11-17 PROCEDURE — 74011250637 HC RX REV CODE- 250/637: Performed by: FAMILY MEDICINE

## 2018-11-17 RX ORDER — IPRATROPIUM BROMIDE AND ALBUTEROL SULFATE 2.5; .5 MG/3ML; MG/3ML
3 SOLUTION RESPIRATORY (INHALATION)
Status: DISCONTINUED | OUTPATIENT
Start: 2018-11-17 | End: 2018-11-18 | Stop reason: HOSPADM

## 2018-11-17 RX ORDER — PREDNISONE 20 MG/1
40 TABLET ORAL
Status: DISCONTINUED | OUTPATIENT
Start: 2018-11-18 | End: 2018-11-18 | Stop reason: HOSPADM

## 2018-11-17 RX ORDER — DILTIAZEM HYDROCHLORIDE 120 MG/1
120 CAPSULE, COATED, EXTENDED RELEASE ORAL DAILY
Status: DISCONTINUED | OUTPATIENT
Start: 2018-11-17 | End: 2018-11-18 | Stop reason: HOSPADM

## 2018-11-17 RX ORDER — METFORMIN HYDROCHLORIDE 500 MG/1
500 TABLET ORAL 2 TIMES DAILY WITH MEALS
Status: DISCONTINUED | OUTPATIENT
Start: 2018-11-17 | End: 2018-11-17

## 2018-11-17 RX ORDER — LISINOPRIL 10 MG/1
10 TABLET ORAL DAILY
Status: DISCONTINUED | OUTPATIENT
Start: 2018-11-17 | End: 2018-11-18 | Stop reason: HOSPADM

## 2018-11-17 RX ORDER — METFORMIN HYDROCHLORIDE 500 MG/1
500 TABLET ORAL 2 TIMES DAILY WITH MEALS
Status: DISCONTINUED | OUTPATIENT
Start: 2018-11-18 | End: 2018-11-18 | Stop reason: HOSPADM

## 2018-11-17 RX ADMIN — CEFTRIAXONE 1 G: 1 INJECTION, POWDER, FOR SOLUTION INTRAMUSCULAR; INTRAVENOUS at 23:11

## 2018-11-17 RX ADMIN — Medication 10 ML: at 07:35

## 2018-11-17 RX ADMIN — IPRATROPIUM BROMIDE AND ALBUTEROL SULFATE 3 ML: .5; 3 SOLUTION RESPIRATORY (INHALATION) at 09:42

## 2018-11-17 RX ADMIN — ESCITALOPRAM OXALATE 10 MG: 10 TABLET ORAL at 08:50

## 2018-11-17 RX ADMIN — METHYLPREDNISOLONE SODIUM SUCCINATE 40 MG: 40 INJECTION, POWDER, FOR SOLUTION INTRAMUSCULAR; INTRAVENOUS at 08:50

## 2018-11-17 RX ADMIN — BUDESONIDE 500 MCG: 0.5 INHALANT RESPIRATORY (INHALATION) at 09:42

## 2018-11-17 RX ADMIN — Medication 10 ML: at 14:57

## 2018-11-17 RX ADMIN — IPRATROPIUM BROMIDE AND ALBUTEROL SULFATE 3 ML: .5; 3 SOLUTION RESPIRATORY (INHALATION) at 20:11

## 2018-11-17 RX ADMIN — INSULIN LISPRO 2 UNITS: 100 INJECTION, SOLUTION INTRAVENOUS; SUBCUTANEOUS at 12:10

## 2018-11-17 RX ADMIN — BUDESONIDE 500 MCG: 0.5 INHALANT RESPIRATORY (INHALATION) at 20:11

## 2018-11-17 RX ADMIN — AZITHROMYCIN MONOHYDRATE 500 MG: 500 INJECTION, POWDER, LYOPHILIZED, FOR SOLUTION INTRAVENOUS at 08:49

## 2018-11-17 RX ADMIN — INSULIN LISPRO 1 UNITS: 100 INJECTION, SOLUTION INTRAVENOUS; SUBCUTANEOUS at 22:00

## 2018-11-17 RX ADMIN — DILTIAZEM HYDROCHLORIDE 120 MG: 120 CAPSULE, COATED, EXTENDED RELEASE ORAL at 08:50

## 2018-11-17 RX ADMIN — INSULIN LISPRO 3 UNITS: 100 INJECTION, SOLUTION INTRAVENOUS; SUBCUTANEOUS at 07:42

## 2018-11-17 RX ADMIN — IPRATROPIUM BROMIDE AND ALBUTEROL SULFATE 3 ML: .5; 3 SOLUTION RESPIRATORY (INHALATION) at 13:29

## 2018-11-17 RX ADMIN — APIXABAN 5 MG: 5 TABLET, FILM COATED ORAL at 23:11

## 2018-11-17 RX ADMIN — INSULIN LISPRO 3 UNITS: 100 INJECTION, SOLUTION INTRAVENOUS; SUBCUTANEOUS at 16:50

## 2018-11-17 RX ADMIN — HYDROCHLOROTHIAZIDE 25 MG: 25 TABLET ORAL at 08:50

## 2018-11-17 RX ADMIN — Medication 10 ML: at 23:12

## 2018-11-17 RX ADMIN — LISINOPRIL 10 MG: 10 TABLET ORAL at 18:17

## 2018-11-17 RX ADMIN — HYDROCODONE BITARTRATE AND ACETAMINOPHEN 1 TABLET: 5; 325 TABLET ORAL at 12:10

## 2018-11-17 RX ADMIN — APIXABAN 5 MG: 5 TABLET, FILM COATED ORAL at 08:50

## 2018-11-17 NOTE — PROGRESS NOTES
Hospitalist Progress Note Mathew Rutledge MD 
Office: [phone] Date of Service:  2018 NAME:  Kasandra Borges 
:  1950 MRN:  [id number] Admission Summary:  
 The patient is a 51-year-old female with past medical history of diverticulitis, history of mild intermittent asthma who presents to the hospital.  Patient reports that about 5 days back, she started feeling weak, fatigued, felt like Yahir Smoker has been beaten up with a pipe\", thought she got the flu and started feeling not well C/c sob. Interval history / Subjective:  
  Her breathing is better but still feels very weak, and lightheaded. Lactic acid of 5 this morning. , This AM she had a.flutter on monitor, palpitations. Changed to tele floor, consulted cards. : 
Patient feels better. Start Tapering Steroids. Cardizem adjusted by Cardiologist today. Start Metformin tomorrow  for new Diagnosis of NIDDM. She had CTA on  so will wait for 24 hours. Assessment & Plan:  
 
Shortness of breath, possibly asthma exacerbation with superimposed upper respiratory infection, Respiratory panel, flu, RSV negative, BCx negative so far. Will cont with IV steroids, nebs. Added ceftriaxone. ABGs showed hypoxia. -CTA no PE. Still has wheezing on exam 
-ECHO: normal EF. -HTN: started HCTZ home med. A.flutter: with palpitations and presyncope like symptoms, had ECHO on admission. Will get TSH. Cards consulted. Lactic acidosis: of 5.1 this AM. Ceftriaxone added, normal LA., f/u on cultures. UA negative. Will repeat CXR in AM. Hyperglycemia: A1c of 8, new DM, diabetic education, SSI, decreased dose of IV steroids. Carb diet, at DC will start on Metformin. Not started now because of lactic acidosis. PT consulted. Code status: full DVT prophylaxis: SQ heparin. Care Plan discussed with: Patient/Family and Nurse Disposition: TBD  
 
 Hospital Problems  Date Reviewed: 11/14/2018 Codes Class Noted POA * (Principal) SOB (shortness of breath) ICD-10-CM: R06.02 
ICD-9-CM: 786.05  11/14/2018 Unknown Review of Systems: A comprehensive review of systems was negative except for that written in the HPI. Vital Signs:  
 Last 24hrs VS reviewed since prior progress note. Most recent are: 
Visit Vitals /72 (BP 1 Location: Left arm, BP Patient Position: At rest) Pulse 60 Temp 97.8 °F (36.6 °C) Resp 18 Ht 5' 7\" (1.702 m) Wt 107.3 kg (236 lb 8.9 oz) SpO2 97% Breastfeeding? No  
BMI 37.05 kg/m² Intake/Output Summary (Last 24 hours) at 11/17/2018 9600 Last data filed at 11/17/2018 4613 Gross per 24 hour Intake 1400 ml Output  Net 1400 ml Physical Examination:  
 
 
     
Constitutional:  No acute distress, cooperative, pleasant   
ENT:  Oral mucous moist, oropharynx benign. Neck supple, Resp:   Wheezing bilaterally, decreased respiratory effort. No accessory muscle use CV:  Regular rhythm, normal rate, no murmurs, gallops, rubs GI:  Soft, non distended, non tender. normoactive bowel sounds, no hepatosplenomegaly Musculoskeletal:  No edema, warm, 2+ pulses throughout Neurologic:  Moves all extremities. AAOx3, CN II-XII reviewed Data Review:  
 Review and/or order of clinical lab test 
Review and/or order of tests in the radiology section of CPT Review and/or order of tests in the medicine section of CPT Labs:  
 
Recent Labs 11/17/18 0443 11/16/18 
0130 WBC 7.3 10.7 HGB 10.4* 9.9*  
HCT 32.6* 30.9*  279 Recent Labs 11/17/18 
0443 11/16/18 
0130 11/15/18 
9027  138 140  
K 4.0 3.9 3.7  104 104 CO2 28 26 24 BUN 23* 23* 20  
CREA 0.71 0.69 0.89 * 246* 305* CA 8.6 8.1* 8.3*  
MG  --  2.3  --   
 
Recent Labs 11/14/18 
1637 SGOT 12* ALT 20 AP 84 TBILI 0.3 TP 7.1 ALB 3.4*  
GLOB 3.7 Recent Labs 11/14/18 
1637 INR 1.0 PTP 10.3 APTT 28.2 No results for input(s): FE, TIBC, PSAT, FERR in the last 72 hours. No results found for: FOL, RBCF No results for input(s): PH, PCO2, PO2 in the last 72 hours. Recent Labs 11/16/18 
1013 11/15/18 
1840 11/15/18 
4662 TROIQ <0.05 <0.05 <0.05 No results found for: CHOL, CHOLX, CHLST, CHOLV, HDL, LDL, LDLC, DLDLP, TGLX, TRIGL, TRIGP, CHHD, CHHDX Lab Results Component Value Date/Time Glucose (POC) 285 (H) 11/17/2018 07:35 AM  
 Glucose (POC) 171 (H) 11/16/2018 10:06 PM  
 Glucose (POC) 230 (H) 11/16/2018 04:29 PM  
 Glucose (POC) 277 (H) 11/16/2018 11:25 AM  
 Glucose (POC) 263 (H) 11/16/2018 06:15 AM  
 
Lab Results Component Value Date/Time Color YELLOW/STRAW 11/14/2018 07:54 PM  
 Appearance CLEAR 11/14/2018 07:54 PM  
 Specific gravity >1.030 (H) 11/14/2018 07:54 PM  
 pH (UA) 5.5 11/14/2018 07:54 PM  
 Protein NEGATIVE  11/14/2018 07:54 PM  
 Glucose 500 (A) 11/14/2018 07:54 PM  
 Ketone NEGATIVE  11/14/2018 07:54 PM  
 Bilirubin NEGATIVE  11/14/2018 07:54 PM  
 Urobilinogen 0.2 11/14/2018 07:54 PM  
 Nitrites NEGATIVE  11/14/2018 07:54 PM  
 Leukocyte Esterase NEGATIVE  11/14/2018 07:54 PM  
 Epithelial cells FEW 11/14/2018 07:54 PM  
 Bacteria NEGATIVE  11/14/2018 07:54 PM  
 WBC 0-4 11/14/2018 07:54 PM  
 RBC 0-5 11/14/2018 07:54 PM  
 
 
 
Medications Reviewed:  
 
Current Facility-Administered Medications Medication Dose Route Frequency  dilTIAZem CD (CARDIZEM CD) capsule 120 mg  120 mg Oral DAILY  metFORMIN (GLUCOPHAGE) tablet 500 mg  500 mg Oral BID WITH MEALS  predniSONE (DELTASONE) tablet 40 mg  40 mg Oral DAILY WITH BREAKFAST  hydroCHLOROthiazide (HYDRODIURIL) tablet 25 mg  25 mg Oral DAILY  influenza vaccine 2018-19 (6 mos+)(PF) (FLUARIX QUAD/FLULAVAL QUAD) injection 0.5 mL  0.5 mL IntraMUSCular PRIOR TO DISCHARGE  apixaban (ELIQUIS) tablet 5 mg  5 mg Oral Q12H  cefTRIAXone (ROCEPHIN) 1 g in 0.9% sodium chloride (MBP/ADV) 50 mL  1 g IntraVENous Q24H  
 albuterol-ipratropium (DUO-NEB) 2.5 MG-0.5 MG/3 ML  3 mL Nebulization Q4H PRN  
 escitalopram oxalate (LEXAPRO) tablet 10 mg  10 mg Oral DAILY  HYDROcodone-acetaminophen (NORCO) 5-325 mg per tablet 1 Tab  1 Tab Oral Q4H PRN  
 sodium chloride (NS) flush 5-10 mL  5-10 mL IntraVENous Q8H  
 sodium chloride (NS) flush 5-10 mL  5-10 mL IntraVENous PRN  
 0.9% sodium chloride infusion  75 mL/hr IntraVENous CONTINUOUS  
 azithromycin (ZITHROMAX) 500 mg in 0.9% sodium chloride 250 mL (Kohr0Jyn)  500 mg IntraVENous Q24H  
 glucose chewable tablet 16 g  4 Tab Oral PRN  
 dextrose (D50W) injection syrg 12.5-25 g  25-50 mL IntraVENous PRN  
 glucagon (GLUCAGEN) injection 1 mg  1 mg IntraMUSCular PRN  
 insulin lispro (HUMALOG) injection   SubCUTAneous AC&HS  
 arformoterol (BROVANA) neb solution 15 mcg  15 mcg Nebulization BID RT And  
 budesonide (PULMICORT) 500 mcg/2 ml nebulizer suspension  500 mcg Nebulization BID RT  
 
______________________________________________________________________ EXPECTED LENGTH OF STAY: - - - 
ACTUAL LENGTH OF STAY:          0 Mathew Rutledge MD

## 2018-11-17 NOTE — PROGRESS NOTES
physical Therapy EVALUATION/DISCHARGE Patient: Luis Enrique Padilla (1 y.o. female) Date: 11/17/2018 Primary Diagnosis: SOB (shortness of breath) standard ASSESSMENT : 
Based on the objective data described below, the patient presents with safe independent ambulation including steps. Systolic BP increased with activity, nursing notified. Skilled physical therapy is not indicated at this time. PLAN : 
Discharge Recommendations: None Further Equipment Recommendations for Discharge: none SUBJECTIVE:  
Patient stated I am doing better.  OBJECTIVE DATA SUMMARY:  
HISTORY:   
Past Medical History:  
Diagnosis Date  Asthma  Gastrointestinal disorder   
 diverticulitis  Ovarian cyst   
 
Past Surgical History:  
Procedure Laterality Date  HX APPENDECTOMY  HX CHOLECYSTECTOMY  HX HYSTERECTOMY partial, has ovaries  HX ORTHOPAEDIC    
 tennis elbow Prior Level of Function/Home Situation: Independent and active Personal factors and/or comorbidities impacting plan of care:  
 
Home Situation Home Environment: Private residence # Steps to Enter: 4 Rails to Enter: Yes Hand Rails : Right Wheelchair Ramp: No 
One/Two Story Residence: Two story, live on 1st floor Living Alone: Yes Support Systems: Family member(s), Friends \ neighbors Patient Expects to be Discharged to[de-identified] Private residence Current DME Used/Available at Home: None EXAMINATION/PRESENTATION/DECISION MAKING:  
Critical Behavior: 
Neurologic State: Alert Orientation Level: Oriented X4 Cognition: Appropriate safety awareness, Appropriate for age attention/concentration, Follows commands Range Of Motion: 
AROM: Within functional limits PROM: Within functional limits Strength:   
Strength: Within functional limits Tone & Sensation:  
Tone: Normal 
  
 Coordination: 
Coordination: Within functional limits Functional Mobility: 
Bed Mobility: 
Rolling: Independent Supine to Sit: Independent Sit to Supine: Independent Scooting: Independent Transfers: 
Sit to Stand: Independent Stand to Sit: Independent Balance:  
Sitting: Intact Standing: Intact Ambulation/Gait Training: 
Distance (ft): 200 Feet (ft) Ambulation - Level of Assistance: Independent Gait Description (WDL): Exceptions to [facility] Stairs: 
Number of Stairs Trained: 3 Stairs - Level of Assistance: Modified independent Rail Use: Right Functional Measure: 
Tinetti test: 
 
Sitting Balance: 1 Arises: 1 Attempts to Rise: 2 Immediate Standing Balance: 2 Standing Balance: 2 Nudged: 2 Eyes Closed: 1 Turn 360 Degrees - Continuous/Discontinuous: 1 Turn 360 Degrees - Steady/Unsteady: 1 Sitting Down: 2 Balance Score: 15 Indication of Gait: 1 
R Step Length/Height: 1 L Step Length/Height: 1 
R Foot Clearance: 1 L Foot Clearance: 1 Step Symmetry: 1 Step Continuity: 1 Path: 2 Trunk: 2 Walking Time: 1 Gait Score: 12 Total Score: 27 Tinetti Test and G-code impairment scale: 
Percentage of Impairment CH 
 
0% 
 CI 
 
1-19% CJ 
 
20-39% CK 
 
40-59% CL 
 
60-79% CM 
 
80-99% CN  
 
100% Tinetti Score 0-28 28 23-27 17-22 12-16 6-11 1-5 0 Tinetti Tool Score Risk of Falls 
<19 = High Fall Risk 19-24 = Moderate Fall Risk 25-28 = Low Fall Risk Tinetti ME. Performance-Oriented Assessment of Mobility Problems in Elderly Patients. [facility]; [id number]. (Scoring Description: PT Bulletin Feb. 10, 1993) Older adults: Babita Bah et al, 2009; n = [address] elderly evaluated with ABC, TANIA, ADL, and IADL) · Mean TANIA score for males aged 69-68 years = 26.21(3.40) · Mean TANIA score for females age 69-68 years = 25.16(4.30) · Mean TANIA score for males over 80 years = 23.29(6.02) · Mean TANIA score for females over 80 years = 17.20(8.32) G codes: In compliance with CMSs Claims Based Outcome Reporting, the following G-code set was chosen for this patient based on their primary functional limitation being treated: The outcome measure chosen to determine the severity of the functional limitation was the tinetti with a score of 27/28 which was correlated with the impairment scale. ? Mobility - Walking and Moving Around:  
  - CURRENT STATUS: CI - 1%-19% impaired, limited or restricted  - GOAL STATUS: CI - 1%-19% impaired, limited or restricted  - D/C STATUS:  CI - 1%-19% impaired, limited or restricted Pain: 
Pain Scale 1: Numeric (0 - 10) Pain Intensity 1: 0 Activity Tolerance:  
Sitting HR 63 /95 O2 sats on RA96% Post activityy sitting HR 75 /83 O2 sats on RA 94% Please refer to the flowsheet for vital signs taken during this treatment. After treatment:  
[x]   Patient left in no apparent distress sitting up in chair 
[]   Patient left in no apparent distress in bed 
[x]   Call bell left within reach [x]   Nursing notified 
[]   Caregiver present 
[]   Bed alarm activated COMMUNICATION/EDUCATION:  
Communication/Collaboration: 
[x]   Fall prevention education was provided and the patient/caregiver indicated understanding. [x]   Patient/family have participated as able and agree with findings and recommendations. []   Patient is unable to participate in plan of care at this time. Findings and recommendations were discussed with: Registered Nurse Thank you for this referral. 
Kashif Martinez, PT Time Calculation: 18 mins

## 2018-11-17 NOTE — PROGRESS NOTES
Cardiology Progress Note Patient Name: Kasandra Borges  : 1950 MRN: [id number] Date: 2018  Time: 1:56 PM 
Admit Diagnosis: SOB (shortness of breath) Primary Cardiologist: none  Consulting Cardiologist: Bibi Hussein. Rosario Stone M.D. 
D[place] Mckeon for Consult: arrhythmia Requesting MD: Chandrika Motta MD 
 
HPI: 
Kasandra Borges is a 76 y.o. female admitted on 2018  for SOB (shortness of breath). has a past medical history of Asthma, Gastrointestinal disorder, and Ovarian cyst.  
Assessment/Plan/Discussion:Cardiology Attending:  
 
. Kasandra Borges is a 76 y.o. female Nice active lady with near syncope likely related to acute pulmonary illness Found to have flutter with RVR with similar symptoms to near syncope Has new DM2 Elevated CHADS-Vasc score Started [address], diltiazem- some periods of what looks like junctional rhythm overnight, so will adjust dilt slightly to 120mg CD.  
-should get up and ambulated today, watch hr and see how she feels. Cautious use of any beta blocker , consider coreg if needed Goal is eliminate PAflutter and lower risk for CVA Op folllow-up with Dr. Andriy Scott. Dennise Edouard MD   
 
 
 
Presented to the ED for syncopal episode with resulted in her falling to her knees and almost blacking out. She did feel palpitations during this time. She is a good historian. She reports this has never happened before. She also reports having no heart issues. She does admit to asthma. She also c/o having a cough and feeling poorly with associated fevers, chills for days prior to presenting. Associated SOB and pleuritic CP. CXR in ED was normal.   
Today she was noted to have a 9 second run of \"Vfib\" on remote telemetry. This was at 9 am this morning. She notes during this time she felt as she did before when almost blacking outa nd palpitations.   Lightheadedness and SOB.  The episode was self terminating. She has since been transferred to [facility]. No h/o smoking or ETOH use. She was a high school and . She also was a  and continued to [facility] in shape\". She has since fallen off her exercising. Admitting to liking food too much. Subjective: 
Denies CP or SOB. Dry, tight cough noted. Currently no palpitations Assessment and Plan 1. PAF/flutter with RVR - Not VFib 
 - Echo EF 55-60%, NRWMA, LA dilated. Otherwise normal 
 - Check TSH 
 - Avoid BB with asthma exacerbation, on IV steroids - Will decreasePO dilt slightly 2. SOB with asthma exacerbation 
 - Nebs and IV steroids - per Primary team 
3. Hyperglycemia 
 - A1c 8.2 
 - DM II 
 - per Primary team 
 
Telemetry personally reviewed. Aflutter with RVR to 190. Echo normal as above. Will start PO dilt as BB needs to be avoided with Asthma exacerbation. Also will start Eliquis with elevated CHaDsVasc score. Review of Systems: 
 
 GENERAL Recent weight loss - no Fever -----------------   no 
 Chills -----------------   no 
 
 EYES, VISION Visual Changes - no 
 
 EARS, NOSE, THROAT Hearing loss ----------- no 
 Swallowing difficulties - no CARDIOVASCULAR Chest pain/pressure ---- no 
 Arrhythmia/palpitations - no RESPIRATORY Cough ------------------ no Shortness of breath - no Wheezing -------------- no GASTROINTESTINAL Abdominal pain - no Heartburn -------- no 
 Bloody stool ----- no 
 
 GENITOURINARY Frequent urination - no Urgency -------------- no 
 MUSCULOSKELETAL Joint pain/swelling ---- no 
 Musculoskeletal pain - no 
 
 SKIN & INTEGUMENTARY Rashes - no Sores --- no 
 
 
   NEUROLOGICAL Numbness/tingling - no Sensation loss ------ no 
 
 PSYCHIATRIC Nervousness/anxiety - no Depression -------------- no 
 
 ENDOCRINE Heat/cold intolerance - no Excessive thirst -------- no 
 
 HEMATOLOGIC/LYMPHATIC Abnormal bleeding - no ALL/IMMUN Allergic reaction ------ no 
 Recurrent infections - no Previous treatment/evaluation includes echocardiogram . Cardiac risk factors: diabetes mellitus, obesity, hypertension, post-menopausal. 
 
Past Medical History:  
Diagnosis Date  Asthma  Gastrointestinal disorder   
 diverticulitis  Ovarian cyst   
 
Past Surgical History:  
Procedure Laterality Date  HX APPENDECTOMY  HX CHOLECYSTECTOMY  HX HYSTERECTOMY partial, has ovaries  HX ORTHOPAEDIC    
 tennis elbow Current Facility-Administered Medications Medication Dose Route Frequency  hydroCHLOROthiazide (HYDRODIURIL) tablet 25 mg  25 mg Oral DAILY  methylPREDNISolone (PF) (SOLU-MEDROL) injection 40 mg  40 mg IntraVENous Q12H  
 influenza vaccine 2018-19 (6 mos+)(PF) (FLUARIX QUAD/FLULAVAL QUAD) injection 0.5 mL  0.5 mL IntraMUSCular PRIOR TO DISCHARGE  dilTIAZem CD (CARDIZEM CD) capsule 180 mg  180 mg Oral DAILY  apixaban (ELIQUIS) tablet 5 mg  5 mg Oral Q12H  cefTRIAXone (ROCEPHIN) 1 g in 0.9% sodium chloride (MBP/ADV) 50 mL  1 g IntraVENous Q24H  
 albuterol-ipratropium (DUO-NEB) 2.5 MG-0.5 MG/3 ML  3 mL Nebulization Q4H PRN  
 escitalopram oxalate (LEXAPRO) tablet 10 mg  10 mg Oral DAILY  HYDROcodone-acetaminophen (NORCO) 5-325 mg per tablet 1 Tab  1 Tab Oral Q4H PRN  
 sodium chloride (NS) flush 5-10 mL  5-10 mL IntraVENous Q8H  
 sodium chloride (NS) flush 5-10 mL  5-10 mL IntraVENous PRN  
 0.9% sodium chloride infusion  75 mL/hr IntraVENous CONTINUOUS  
 azithromycin (ZITHROMAX) 500 mg in 0.9% sodium chloride 250 mL ([id number])  500 mg IntraVENous Q24H  
 glucose chewable tablet 16 g  4 Tab Oral PRN  
 dextrose (D50W) injection syrg 12.5-25 g  25-50 mL IntraVENous PRN  
 glucagon (GLUCAGEN) injection 1 mg  1 mg IntraMUSCular PRN  
 insulin lispro (HUMALOG) injection   SubCUTAneous AC&HS  
  arformoterol (BROVANA) neb solution 15 mcg  15 mcg Nebulization BID RT And  
 budesonide (PULMICORT) 500 mcg/2 ml nebulizer suspension  500 mcg Nebulization BID RT Allergies Allergen Reactions  Garlic Hives  Penicillin G Hives History reviewed. No pertinent family history. Social History Socioeconomic History  Marital status:  Spouse name: Not on file  Number of children: Not on file  Years of education: Not on file  Highest education level: Not on file Social Needs  Financial resource strain: Not on file  Food insecurity - worry: Not on file  Food insecurity - inability: Not on file  Transportation needs - medical: Not on file  Transportation needs - non-medical: Not on file Occupational History  Not on file Tobacco Use  Smoking status: Never Smoker  Smokeless tobacco: Never Used Substance and Sexual Activity  Alcohol use: Yes Comment: once per 6 months at social event  Drug use: No  
 Sexual activity: Not on file Other Topics Concern  Not on file Social History Narrative  Not on file Objective: 
  Physical Exam 
 
Vitals:  
Vitals:  
 11/16/18 1916 11/16/18 2109 11/16/18 2340 11/17/18 0450 BP: 140/73  139/62 169/71 Pulse: 61  60 (!) 51 Resp: 20  18 17 Temp: 98.3 °F (36.8 °C)  98 °F (36.7 °C) 97.9 °F (36.6 °C) SpO2: 95% 95% 96% 94% Weight:    236 lb 8.9 oz (107.3 kg) Height:      
 
 
General:    Alert, cooperative, no distress, appears stated age. Neck:   Supple, no carotid bruit and no JVD. Back:     Symmetric, normal curvature. Lungs:     End exp wheeze to auscultation bilaterally. Heart[de-identified]    Regular rate and rhythm, S1, S2 normal, no murmur, click, rub or gallop. Abdomen:     Soft, non-tender. Bowel sounds normal.   
Extremities:   Extremities normal, atraumatic, no cyanosis or edema. Vascular:   Pulses - 2+ radials Skin:   Skin color normal. No rashes or lesions Neurologic:   CN II-XII grossly intact. Telemetry:  
 
Aflutter, variable, with  ECG:  
EKG Results Procedure 720 Value Units Date/Time EKG, 12 LEAD, INITIAL [[id number]] Collected:  11/16/18 1001 Order Status:  Completed Updated:  11/16/18 1200 Ventricular Rate 65 BPM   
  Atrial Rate 65 BPM   
  P-R Interval 136 ms QRS Duration 100 ms Q-T Interval 446 ms   
  QTC Calculation (Bezet) 463 ms Calculated P Axis 23 degrees Calculated R Axis 22 degrees Calculated T Axis 22 degrees Diagnosis --  
  Sinus rhythm with marked sinus arrhythmia Otherwise normal ECG When compared with ECG of 15-NOV-2018 18:24, No significant change was found Confirmed by Ainsley Moreno MD. ([id number]) on 11/16/2018 12:00:09 PM 
  
 EKG, 12 LEAD, INITIAL [[id number]] Collected:  11/15/18 1824 Order Status:  Completed Updated:  11/15/18 2045 Ventricular Rate 76 BPM   
  Atrial Rate 76 BPM   
  P-R Interval 154 ms QRS Duration 104 ms Q-T Interval 430 ms QTC Calculation (Bezet) 483 ms Calculated P Axis 58 degrees Calculated R Axis 40 degrees Calculated T Axis 24 degrees Diagnosis --  
  Normal sinus rhythm Confirmed by Stefani Lucero M.D., Lacie Qureshi ([id number]) on 11/15/2018 8:45:32 PM 
  
 EKG, 12 LEAD, INITIAL [[id number]] Collected:  11/15/18 0449 Order Status:  Completed Updated:  11/15/18 1228 Ventricular Rate 71 BPM   
  Atrial Rate 71 BPM   
  P-R Interval 172 ms QRS Duration 100 ms Q-T Interval 426 ms   
  QTC Calculation (Bezet) 462 ms Calculated P Axis 52 degrees Calculated R Axis 36 degrees Calculated T Axis 32 degrees Diagnosis --  
  Normal sinus rhythm When compared with ECG of 15-NOV-2018 04:49, No significant change was found Confirmed by Ainsley Moreno MD. ([id number]) on 11/15/2018 12:28:37 PM 
  
 EKG, 12 LEAD, INITIAL [[id number]] Collected:  11/14/18 8174 Order Status:  Completed Updated:  11/15/18 1518 Ventricular Rate 68 BPM   
  Atrial Rate 68 BPM   
  P-R Interval 166 ms   
  QRS Duration 98 ms Q-T Interval 446 ms   
  QTC Calculation (Bezet) 474 ms Calculated P Axis 70 degrees Calculated R Axis 12 degrees Calculated T Axis 28 degrees Diagnosis --  
  Normal sinus rhythm When compared with ECG of 16-OCT-2018 17:21, 
premature atrial complexes are no longer present Confirmed by Patricia Macias MD. ([id number]) on 11/15/2018 12:22:13 PM 
  
  
 
 
 
Data Review:  
 
Radiology: XR Results (most recent): 
Results from Hospital Encounter encounter on 11/14/18 XR CHEST PA LAT Narrative Chest PA and lateral 
 
History: Cough. Rule out pneumonia Comparison: 3/19/2017 Findings: The lungs are well expanded. No focal consolidation, pleural 
effusion, or pneumothorax. The heart is mildly enlarged, but unchanged. Multilevel mild spondylosis. Impression Impression: No acute cardiopulmonary process. Recent Labs 11/16/18 
1013 11/15/18 
1840 11/15/18 
5288 TROIQ <0.05 <0.05 <0.05 Recent Labs 11/17/18 
0443 11/16/18 
0130  138  
K 4.0 3.9  104 CO2 28 26 BUN 23* 23* CREA 0.71 0.69 * 246* CA 8.6 8.1* Recent Labs 11/17/18 
0443 11/16/18 
0130 WBC 7.3 10.7 HGB 10.4* 9.9*  
HCT 32.6* 30.9*  279 Recent Labs 11/14/18 
1637 PTP 10.3 INR 1.0  
SGOT 12* AP 84 No results for input(s): CHOL, LDLC in the last 72 hours. No lab exists for component: TGL, HDLC,  HBA1C Recent Labs 11/16/18 
1013 TSH 1.17 Dilcia Qureshi. THOMAS Mercedes M.D. [facility], [address] [place], [facility] [address] 
   [phone] CC: Other, MD Sophy

## 2018-11-17 NOTE — PROGRESS NOTES
Problem: Falls - Risk of 
Goal: *Absence of Falls Document Ramandeep Green Fall Risk and appropriate interventions in the flowsheet. Outcome: Progressing Towards Goal 
Fall Risk Interventions: 
Mobility Interventions: Communicate number of staff needed for ambulation/transfer, Patient to call before getting OOB Medication Interventions: Assess postural VS orthostatic hypotension, Evaluate medications/consider consulting pharmacy, Patient to call before getting OOB, Teach patient to arise slowly Elimination Interventions: Call light in reach History of Falls Interventions: Consult care management for discharge planning Bedside and Verbal shift change report given to Sergio Jernigan RN (oncoming nurse) by Tamiko Morocho RN (offgoing nurse). Report included the following information SBAR, Kardex, Recent Results and Cardiac Rhythm Sinus Artice Coup.

## 2018-11-18 VITALS
HEART RATE: 50 BPM | BODY MASS INDEX: 37.13 KG/M2 | OXYGEN SATURATION: 96 % | WEIGHT: 236.55 LBS | RESPIRATION RATE: 18 BRPM | TEMPERATURE: 97.9 F | HEIGHT: 67 IN | SYSTOLIC BLOOD PRESSURE: 168 MMHG | DIASTOLIC BLOOD PRESSURE: 73 MMHG

## 2018-11-18 LAB
ANION GAP SERPL CALC-SCNC: 7 MMOL/L (ref 5–15)
BUN SERPL-MCNC: 24 MG/DL (ref 6–20)
BUN/CREAT SERPL: 31 (ref 12–20)
CALCIUM SERPL-MCNC: 8.4 MG/DL (ref 8.5–10.1)
CHLORIDE SERPL-SCNC: 101 MMOL/L (ref 97–108)
CO2 SERPL-SCNC: 29 MMOL/L (ref 21–32)
CREAT SERPL-MCNC: 0.77 MG/DL (ref 0.55–1.02)
ERYTHROCYTE [DISTWIDTH] IN BLOOD BY AUTOMATED COUNT: 12.9 % (ref 11.5–14.5)
GLUCOSE BLD STRIP.AUTO-MCNC: 203 MG/DL (ref 65–100)
GLUCOSE SERPL-MCNC: 214 MG/DL (ref 65–100)
HCT VFR BLD AUTO: 31.5 % (ref 35–47)
HGB BLD-MCNC: 10.3 G/DL (ref 11.5–16)
MCH RBC QN AUTO: 28.5 PG (ref 26–34)
MCHC RBC AUTO-ENTMCNC: 32.7 G/DL (ref 30–36.5)
MCV RBC AUTO: 87.3 FL (ref 80–99)
NRBC # BLD: 0 K/UL (ref 0–0.01)
NRBC BLD-RTO: 0 PER 100 WBC
PLATELET # BLD AUTO: 264 K/UL (ref 150–400)
PMV BLD AUTO: 10.5 FL (ref 8.9–12.9)
POTASSIUM SERPL-SCNC: 3.3 MMOL/L (ref 3.5–5.1)
RBC # BLD AUTO: 3.61 M/UL (ref 3.8–5.2)
SERVICE CMNT-IMP: ABNORMAL
SODIUM SERPL-SCNC: 137 MMOL/L (ref 136–145)
WBC # BLD AUTO: 8.4 K/UL (ref 3.6–11)

## 2018-11-18 PROCEDURE — 90686 IIV4 VACC NO PRSV 0.5 ML IM: CPT | Performed by: HOSPITALIST

## 2018-11-18 PROCEDURE — 80048 BASIC METABOLIC PNL TOTAL CA: CPT

## 2018-11-18 PROCEDURE — 74011250636 HC RX REV CODE- 250/636: Performed by: FAMILY MEDICINE

## 2018-11-18 PROCEDURE — 74011250637 HC RX REV CODE- 250/637: Performed by: INTERNAL MEDICINE

## 2018-11-18 PROCEDURE — 90471 IMMUNIZATION ADMIN: CPT

## 2018-11-18 PROCEDURE — 74011636637 HC RX REV CODE- 636/637: Performed by: FAMILY MEDICINE

## 2018-11-18 PROCEDURE — 74011636637 HC RX REV CODE- 636/637: Performed by: HOSPITALIST

## 2018-11-18 PROCEDURE — 74011000250 HC RX REV CODE- 250: Performed by: HOSPITALIST

## 2018-11-18 PROCEDURE — 82962 GLUCOSE BLOOD TEST: CPT

## 2018-11-18 PROCEDURE — 74011250637 HC RX REV CODE- 250/637: Performed by: HOSPITALIST

## 2018-11-18 PROCEDURE — 74011250636 HC RX REV CODE- 250/636: Performed by: HOSPITALIST

## 2018-11-18 PROCEDURE — 74011250637 HC RX REV CODE- 250/637: Performed by: FAMILY MEDICINE

## 2018-11-18 PROCEDURE — 85027 COMPLETE CBC AUTOMATED: CPT

## 2018-11-18 PROCEDURE — 74011000250 HC RX REV CODE- 250: Performed by: FAMILY MEDICINE

## 2018-11-18 PROCEDURE — 36415 COLL VENOUS BLD VENIPUNCTURE: CPT

## 2018-11-18 PROCEDURE — 94640 AIRWAY INHALATION TREATMENT: CPT

## 2018-11-18 PROCEDURE — 74011250637 HC RX REV CODE- 250/637: Performed by: PHYSICIAN ASSISTANT

## 2018-11-18 RX ORDER — DILTIAZEM HYDROCHLORIDE 120 MG/1
120 CAPSULE, COATED, EXTENDED RELEASE ORAL DAILY
Qty: 30 CAP | Refills: 0 | Status: SHIPPED | OUTPATIENT
Start: 2018-11-19 | End: 2018-12-12 | Stop reason: SDUPTHER

## 2018-11-18 RX ORDER — METFORMIN HYDROCHLORIDE 500 MG/1
500 TABLET ORAL 2 TIMES DAILY WITH MEALS
Qty: 60 TAB | Refills: 0 | Status: SHIPPED | OUTPATIENT
Start: 2018-11-18 | End: 2019-06-19 | Stop reason: DRUGHIGH

## 2018-11-18 RX ORDER — POTASSIUM CHLORIDE 750 MG/1
20 TABLET, FILM COATED, EXTENDED RELEASE ORAL
Status: COMPLETED | OUTPATIENT
Start: 2018-11-18 | End: 2018-11-18

## 2018-11-18 RX ORDER — LISINOPRIL 10 MG/1
10 TABLET ORAL DAILY
Qty: 30 TAB | Refills: 0 | Status: SHIPPED | OUTPATIENT
Start: 2018-11-19 | End: 2018-12-12 | Stop reason: SDUPTHER

## 2018-11-18 RX ADMIN — METFORMIN HYDROCHLORIDE 500 MG: 500 TABLET ORAL at 08:15

## 2018-11-18 RX ADMIN — AZITHROMYCIN MONOHYDRATE 500 MG: 500 INJECTION, POWDER, LYOPHILIZED, FOR SOLUTION INTRAVENOUS at 08:20

## 2018-11-18 RX ADMIN — DILTIAZEM HYDROCHLORIDE 120 MG: 120 CAPSULE, COATED, EXTENDED RELEASE ORAL at 08:20

## 2018-11-18 RX ADMIN — Medication 10 ML: at 08:20

## 2018-11-18 RX ADMIN — PREDNISONE 40 MG: 20 TABLET ORAL at 08:20

## 2018-11-18 RX ADMIN — INFLUENZA VIRUS VACCINE 0.5 ML: 15; 15; 15; 15 SUSPENSION INTRAMUSCULAR at 09:39

## 2018-11-18 RX ADMIN — IPRATROPIUM BROMIDE AND ALBUTEROL SULFATE 3 ML: .5; 3 SOLUTION RESPIRATORY (INHALATION) at 02:45

## 2018-11-18 RX ADMIN — IPRATROPIUM BROMIDE AND ALBUTEROL SULFATE 3 ML: .5; 3 SOLUTION RESPIRATORY (INHALATION) at 07:32

## 2018-11-18 RX ADMIN — APIXABAN 5 MG: 5 TABLET, FILM COATED ORAL at 08:20

## 2018-11-18 RX ADMIN — HYDROCHLOROTHIAZIDE 25 MG: 25 TABLET ORAL at 08:20

## 2018-11-18 RX ADMIN — POTASSIUM CHLORIDE 20 MEQ: 750 TABLET, EXTENDED RELEASE ORAL at 08:56

## 2018-11-18 RX ADMIN — ESCITALOPRAM OXALATE 10 MG: 10 TABLET ORAL at 08:20

## 2018-11-18 RX ADMIN — BUDESONIDE 500 MCG: 0.5 INHALANT RESPIRATORY (INHALATION) at 07:32

## 2018-11-18 RX ADMIN — LISINOPRIL 10 MG: 10 TABLET ORAL at 08:20

## 2018-11-18 RX ADMIN — Medication 10 ML: at 06:00

## 2018-11-18 RX ADMIN — INSULIN LISPRO 2 UNITS: 100 INJECTION, SOLUTION INTRAVENOUS; SUBCUTANEOUS at 07:11

## 2018-11-18 NOTE — PROGRESS NOTES
11/18/18 To whom it may concern, 
 
 Ms Enma Au has been in the hospital from 11/14/2018 till 11/18/2018. Please excuse her absence. She should not return to work till seen by her primary care physician.  
 
  John Harris MD

## 2018-11-18 NOTE — PROGRESS NOTES
I have reviewed discharge instructions with the patient. The patient verbalized understanding. I went over follow up appointments and prescriptions. Pt was given medication handouts including side effects on all prescriptions. Pt was given opportunity for clarification and questions. Pt signed copy of discharge summary.

## 2018-11-18 NOTE — PROGRESS NOTES
Cardiology Progress Note Patient Name: Orlando Shafer  : 1950 MRN: [id number] Date: 2018  Time: 1:56 PM 
Admit Diagnosis: SOB (shortness of breath) 'light-for-dates' infant with signs of fetal malnutrition Primary Cardiologist: none  Consulting Cardiologist: Mj Leija M.D. 
Carli George for Consult: arrhythmia Requesting MD: Javi Fitzpatrick MD 
 
HPI: 
Orlando Shafer is a 76 y.o. female admitted on 2018  for SOB (shortness of breath) 'light-for-dates' infant with signs of fetal malnutrition. has a past medical history of Asthma, Gastrointestinal disorder, and Ovarian cyst.  
Assessment/Plan/Discussion:Cardiology Attending:  
 
Orlando Shafer is a 76 y.o. female Nice active lady with near syncope likely related to acute pulmonary illness Found to have flutter with RVR with similar symptoms to near syncope Has new DM2 Elevated CHADS-Vasc score Started [address], tolerating dilt at 120mg CD. Back to nsr today Cautious use of any beta blocker , consider coreg if needed Goal is eliminate PAflutter and lower risk for CVA 
-she intends to make lifestyle changes including controlling stress and exercising. stable to dc today from cardiac standpoint. Op folllow-up with Dr. Israel Merchant. Heather Baumann MD   
 
 
 
Presented to the ED for syncopal episode with resulted in her falling to her knees and almost blacking out. She did feel palpitations during this time. She is a good historian. She reports this has never happened before. She also reports having no heart issues. She does admit to asthma. She also c/o having a cough and feeling poorly with associated fevers, chills for days prior to presenting. Associated SOB and pleuritic CP. CXR in ED was normal.   
Today she was noted to have a 9 second run of \"Vfib\" on remote telemetry. This was at 9 am this morning.   She notes during this time she felt as she did before when almost blacking outa nd palpitations. Lightheadedness and SOB. The episode was self terminating. She has since been transferred to [facility]. No h/o smoking or ETOH use. She was a high school and . She also was a  and continued to [facility] in shape\". She has since fallen off her exercising. Admitting to liking food too much. Subjective: 
Denies CP or SOB. Dry, tight cough noted. Currently no palpitations Assessment and Plan 1. PAF/flutter with RVR - Not VFib 
 - Echo EF 55-60%, NRWMA, LA dilated. Otherwise normal 
 - Avoid BB with asthma exacerbation, on IV steroids 2. SOB with asthma exacerbation improved 3. Hyperglycemia 
 - A1c 8.2 
 - DM II 
 - per Primary team 
 
Telemetry personally reviewed. Aflutter with RVR to 190. NSR now on the po dilt and tolerating the eliquis, stable to DC Review of Systems: 
 
 GENERAL Recent weight loss - no Fever -----------------   no 
 Chills -----------------   no 
 
 EYES, VISION Visual Changes - no 
 
 EARS, NOSE, THROAT Hearing loss ----------- no 
 Swallowing difficulties - no CARDIOVASCULAR Chest pain/pressure ---- no 
 Arrhythmia/palpitations - no RESPIRATORY Cough ------------------ no Shortness of breath - no Wheezing -------------- no GASTROINTESTINAL Abdominal pain - no Heartburn -------- no 
 Bloody stool ----- no 
 
 GENITOURINARY Frequent urination - no Urgency -------------- no 
 MUSCULOSKELETAL Joint pain/swelling ---- no 
 Musculoskeletal pain - no 
 
 SKIN & INTEGUMENTARY Rashes - no Sores --- no 
 
 
   NEUROLOGICAL Numbness/tingling - no Sensation loss ------ no 
 
 PSYCHIATRIC Nervousness/anxiety - no Depression -------------- no 
 
 ENDOCRINE Heat/cold intolerance - no Excessive thirst -------- no 
 
 HEMATOLOGIC/LYMPHATIC Abnormal bleeding - no ALL/IMMUN  Allergic reaction ------ no 
 Recurrent infections - no 
 
 Previous treatment/evaluation includes echocardiogram . Cardiac risk factors: diabetes mellitus, obesity, hypertension, post-menopausal. 
 
Past Medical History:  
Diagnosis Date  Asthma  Gastrointestinal disorder   
 diverticulitis  Ovarian cyst   
 
Past Surgical History:  
Procedure Laterality Date  HX APPENDECTOMY  HX CHOLECYSTECTOMY  HX HYSTERECTOMY partial, has ovaries  HX ORTHOPAEDIC    
 tennis elbow Current Facility-Administered Medications Medication Dose Route Frequency  dilTIAZem CD (CARDIZEM CD) capsule 120 mg  120 mg Oral DAILY  predniSONE (DELTASONE) tablet 40 mg  40 mg Oral DAILY WITH BREAKFAST  metFORMIN (GLUCOPHAGE) tablet 500 mg  500 mg Oral BID WITH MEALS  
 albuterol-ipratropium (DUO-NEB) 2.5 MG-0.5 MG/3 ML  3 mL Nebulization Q6H RT  
 lisinopril (PRINIVIL, ZESTRIL) tablet 10 mg  10 mg Oral DAILY  hydroCHLOROthiazide (HYDRODIURIL) tablet 25 mg  25 mg Oral DAILY  influenza vaccine 2018-19 (6 mos+)(PF) (FLUARIX QUAD/FLULAVAL QUAD) injection 0.5 mL  0.5 mL IntraMUSCular PRIOR TO DISCHARGE  apixaban (ELIQUIS) tablet 5 mg  5 mg Oral Q12H  cefTRIAXone (ROCEPHIN) 1 g in 0.9% sodium chloride (MBP/ADV) 50 mL  1 g IntraVENous Q24H  
 escitalopram oxalate (LEXAPRO) tablet 10 mg  10 mg Oral DAILY  HYDROcodone-acetaminophen (NORCO) 5-325 mg per tablet 1 Tab  1 Tab Oral Q4H PRN  
 sodium chloride (NS) flush 5-10 mL  5-10 mL IntraVENous Q8H  
 sodium chloride (NS) flush 5-10 mL  5-10 mL IntraVENous PRN  
 azithromycin (ZITHROMAX) 500 mg in 0.9% sodium chloride 250 mL ([id number])  500 mg IntraVENous Q24H  
 glucose chewable tablet 16 g  4 Tab Oral PRN  
 dextrose (D50W) injection syrg 12.5-25 g  25-50 mL IntraVENous PRN  
 glucagon (GLUCAGEN) injection 1 mg  1 mg IntraMUSCular PRN  
 insulin lispro (HUMALOG) injection   SubCUTAneous AC&HS  
 arformoterol (BROVANA) neb solution 15 mcg  15 mcg Nebulization BID RT  And  
  budesonide (PULMICORT) 500 mcg/2 ml nebulizer suspension  500 mcg Nebulization BID RT Allergies Allergen Reactions  Garlic Hives  Penicillin G Hives History reviewed. No pertinent family history. Social History Socioeconomic History  Marital status:  Spouse name: Not on file  Number of children: Not on file  Years of education: Not on file  Highest education level: Not on file Social Needs  Financial resource strain: Not on file  Food insecurity - worry: Not on file  Food insecurity - inability: Not on file  Transportation needs - medical: Not on file  Transportation needs - non-medical: Not on file Occupational History  Not on file Tobacco Use  Smoking status: Never Smoker  Smokeless tobacco: Never Used Substance and Sexual Activity  Alcohol use: Yes Comment: once per 6 months at social event  Drug use: No  
 Sexual activity: Not on file Other Topics Concern  Not on file Social History Narrative  Not on file Objective: 
  Physical Exam 
 
Vitals:  
Vitals:  
 11/18/18 0726 11/18/18 0245 11/18/18 3465 11/18/18 6731 BP: 151/70  168/73 Pulse: (!) 51  (!) 50 Resp: 18  18 Temp: 98 °F (36.7 °C)  97.9 °F (36.6 °C) SpO2: 94% 96% 96% 96% Weight:      
Height:      
 
 
General:    Alert, cooperative, no distress, appears stated age. Neck:   Supple, no carotid bruit and no JVD. Back:     Symmetric, normal curvature. Lungs:     End exp wheeze to auscultation bilaterally. Heart[de-identified]    Regular rate and rhythm, S1, S2 normal, no murmur, click, rub or gallop. Abdomen:     Soft, non-tender. Bowel sounds normal.   
Extremities:   Extremities normal, atraumatic, no cyanosis or edema. Vascular:   Pulses - 2+ radials Skin:   Skin color normal. No rashes or lesions Neurologic:   CN II-XII grossly intact. Telemetry:  
 
Aflutter, variable, with  ECG:  
EKG Results Procedure 720 Value Units Date/Time SCANNED CARDIAC RHYTHM STRIP [[id number]] Collected:  11/18/18 0600 Order Status:  Completed Updated:  11/18/18 0652 SCANNED CARDIAC RHYTHM STRIP [[id number]] Collected:  11/17/18 0121 Order Status:  Completed Updated:  11/17/18 6628 SCANNED CARDIAC RHYTHM STRIP [[id number]] Collected:  11/17/18 0340 Order Status:  Completed Updated:  11/17/18 2903 EKG, 12 LEAD, INITIAL [[id number]] Collected:  11/16/18 1001 Order Status:  Completed Updated:  11/16/18 1200 Ventricular Rate 65 BPM   
  Atrial Rate 65 BPM   
  P-R Interval 136 ms QRS Duration 100 ms Q-T Interval 446 ms   
  QTC Calculation (Bezet) 463 ms Calculated P Axis 23 degrees Calculated R Axis 22 degrees Calculated T Axis 22 degrees Diagnosis --  
  Sinus rhythm with marked sinus arrhythmia Otherwise normal ECG When compared with ECG of 15-NOV-2018 18:24, No significant change was found Confirmed by Eric Richardson MD. ([id number]) on 11/16/2018 12:00:09 PM 
  
 EKG, 12 LEAD, INITIAL [[id number]] Collected:  11/15/18 1824 Order Status:  Completed Updated:  11/15/18 2045 Ventricular Rate 76 BPM   
  Atrial Rate 76 BPM   
  P-R Interval 154 ms QRS Duration 104 ms Q-T Interval 430 ms QTC Calculation (Bezet) 483 ms Calculated P Axis 58 degrees Calculated R Axis 40 degrees Calculated T Axis 24 degrees Diagnosis --  
  Normal sinus rhythm Confirmed by Vladimir De Dios M.D., Tom Bullion ([id number]) on 11/15/2018 8:45:32 PM 
  
 EKG, 12 LEAD, INITIAL [[id number]] Collected:  11/15/18 0449 Order Status:  Completed Updated:  11/15/18 1228 Ventricular Rate 71 BPM   
  Atrial Rate 71 BPM   
  P-R Interval 172 ms QRS Duration 100 ms Q-T Interval 426 ms   
  QTC Calculation (Bezet) 462 ms Calculated P Axis 52 degrees Calculated R Axis 36 degrees Calculated T Axis 32 degrees Diagnosis --  
  Normal sinus rhythm When compared with ECG of 15-NOV-2018 04:49, No significant change was found Confirmed by Patricia Macias MD. ([id number]) on 11/15/2018 12:28:37 PM 
  
 EKG, 12 LEAD, INITIAL [[id number]] Collected:  11/14/18 1711 Order Status:  Completed Updated:  11/15/18 5221 Ventricular Rate 68 BPM   
  Atrial Rate 68 BPM   
  P-R Interval 166 ms   
  QRS Duration 98 ms Q-T Interval 446 ms   
  QTC Calculation (Bezet) 474 ms Calculated P Axis 70 degrees Calculated R Axis 12 degrees Calculated T Axis 28 degrees Diagnosis --  
  Normal sinus rhythm When compared with ECG of 16-OCT-2018 17:21, 
premature atrial complexes are no longer present Confirmed by Patricia Macias MD. ([id number]) on 11/15/2018 12:22:13 PM 
  
  
 
 
 
Data Review:  
 
Radiology: XR Results (most recent): 
Results from Hospital Encounter encounter on 11/14/18 XR CHEST PA LAT Narrative Chest PA and lateral 
 
History: Cough. Rule out pneumonia Comparison: 3/19/2017 Findings: The lungs are well expanded. No focal consolidation, pleural 
effusion, or pneumothorax. The heart is mildly enlarged, but unchanged. Multilevel mild spondylosis. Impression Impression: No acute cardiopulmonary process. Recent Labs 11/16/18 
1013 11/15/18 
1840 TROIQ <0.05 <0.05 Recent Labs 11/18/18 
0225 11/17/18 
4745  137  
K 3.3* 4.0  
 101 CO2 29 28 BUN 24* 23* CREA 0.77 0.71 * 277* CA 8.4* 8.6 Recent Labs 11/18/18 
0225 11/17/18 
4842 WBC 8.4 7.3 HGB 10.3* 10.4* HCT 31.5* 32.6*  
 276 No results for input(s): PTP, INR, GPT, SGOT, AP in the last 72 hours. No lab exists for component: PTTP, INREXT, INREXT No results for input(s): CHOL, LDLC in the last 72 hours. No lab exists for component: TGL, HDLC,  HBA1C Recent Labs 11/16/18 
1013 TSH 1.17 Dilcia Qureshi. THOMAS Mercedes [address] [place] [address], M.D [facility] [address] [place] [address], [address] Khalida Dash 
   [phone] CC: Other, MD Sophy

## 2018-11-19 NOTE — DISCHARGE SUMMARY
[id number]  MR#: [id number]  : 1950  ACCOUNT #: [de-identified]   ADMIT DATE: 2018  DISCHARGE DATE: 2018    PRIMARY CARE PHYSICIAN:  [facility]    DIAGNOSIS ON ADMISSION:  Shortness of breath. DISCHARGE DIAGNOSES:  1. Asthma exacerbation. 2.  New diagnosis of diabetes. 3.  Atrial flutter. 4.  Hypertension. HISTORY OF PRESENT ILLNESS:  The patient is a very pleasant 69-year-old  female with previous history significant for mild intermittent asthma, usually controlled by Advair and p.r.n. albuterol, presented to the emergency room due to feeling fatigued, weak and short of breath. Patient was seen at [facility].  A chest x-ray was done there and it was suggestive of questionable pneumonia. She was given 2 nebulizer treatments and then sent to the emergency room. In the emergency room, patient was diagnosed with hyperglycemia. A CTA of the chest was done, which was negative for pulmonary embolism. COURSE DURING HOSPITAL STAY:  The patient remained stable during her hospital stay. She was admitted on telemetry and was found to be in atrial flutter with rapid ventricular response. The patient had elevated CHADS2 VASc score, so was started on oral anticoagulants and Cardizem. A beta blocker was not used due to her history of asthma. Patient was seen by Dr. Ry Ibanez and was subsequently seen by Dr. Yves Gomez. An echocardiogram was done which showed an ejection fraction of 55-60%. She was treated with Zithromax and Rocephin for bronchitis/community-acquired pneumonia. The patient improved dramatically and is back to baseline. Her heart rate is well controlled. She will be discharged. Incidentally, the patient was found to be hypertensive. Lisinopril was also added. Patient was treated with sliding scale as she had a CTA, but now will be started on metformin. DISCHARGE MEDICATIONS:  1. Apixaban 5 mg every 12 hours. 2.  Cardizem- mg once daily. 3.  Hydrochlorothiazide 25 mg once daily. 4.  Lexapro 10 mg once daily. 5.  Metformin 500 mg b.i.d. FOLLOWUP:  The patient is supposed to follow up with her primary care physician in 3-5 days. She is also supposed to follow up with Dr. Artie Torres in 2-3 weeks. It is of note that patient has received Rocephin and Zithromax and her cultures are negative. DISPOSITION:  Home in a stable condition.       MD GLORIA Esquivel/PN  D: 11/18/2018 08:35     T: 11/19/2018 08:05  JOB #: [id number]

## 2018-11-20 ENCOUNTER — TELEPHONE (OUTPATIENT)
Dept: CARDIOLOGY CLINIC | Age: 68
End: 2018-11-20

## 2018-11-20 LAB
BACTERIA SPEC CULT: NORMAL
SERVICE CMNT-IMP: NORMAL

## 2018-11-26 ENCOUNTER — OFFICE VISIT (OUTPATIENT)
Dept: CARDIOLOGY CLINIC | Age: 68
End: 2018-11-26

## 2018-11-26 VITALS
WEIGHT: 212 LBS | OXYGEN SATURATION: 96 % | HEIGHT: 67 IN | BODY MASS INDEX: 33.27 KG/M2 | HEART RATE: 73 BPM | RESPIRATION RATE: 18 BRPM | SYSTOLIC BLOOD PRESSURE: 130 MMHG | DIASTOLIC BLOOD PRESSURE: 80 MMHG

## 2018-11-26 DIAGNOSIS — I10 ESSENTIAL HYPERTENSION: ICD-10-CM

## 2018-11-26 DIAGNOSIS — J45.909 ASTHMA, UNSPECIFIED ASTHMA SEVERITY, UNSPECIFIED WHETHER COMPLICATED, UNSPECIFIED WHETHER PERSISTENT: ICD-10-CM

## 2018-11-26 DIAGNOSIS — R11.2 NAUSEA AND VOMITING, INTRACTABILITY OF VOMITING NOT SPECIFIED, UNSPECIFIED VOMITING TYPE: ICD-10-CM

## 2018-11-26 DIAGNOSIS — I48.92 PAROXYSMAL ATRIAL FLUTTER (HCC): Primary | ICD-10-CM

## 2018-11-26 DIAGNOSIS — E87.6 HYPOKALEMIA: ICD-10-CM

## 2018-11-26 DIAGNOSIS — R06.02 SOB (SHORTNESS OF BREATH): ICD-10-CM

## 2018-11-26 RX ORDER — POTASSIUM CHLORIDE 750 MG/1
TABLET, EXTENDED RELEASE ORAL
COMMUNITY
End: 2020-08-31

## 2018-11-26 NOTE — PATIENT INSTRUCTIONS
Stop HCTZ and follow up in 1 month. Follow up with Primary care if vomiting returns. A referral has been sent for you to see [facility]monary associates. They will be in contact with you to scheduled an appointment. If you do not receive a call by next week please contact them at [phone].

## 2018-11-26 NOTE — PROGRESS NOTES
Adrianne Bella     1950       Marcelino Rodriguez MD, [facility]  Date of Visit-11/26/2018   PCP is Other, MD Sophy   [facility]  [facility]  HPI:  Adrianne Bella is a 76 y.o. female   Has asthma  Hospital stay last week with worse shortness of breath  Developed atrial flutter with RVR   Treated with diltiazem and put on [address] [place]  Had HTN and lisinopril and HCTZ started  Before hospital stay not on  Cardiac or HTN meds other than HCTZ  Takes Advair Proair  Takes one inhaler daily  Since dc, has not felt well, has nausea and vomiting and stomach upset  Saw PCP at [facility] and UA showed no UTI, K was 3.2 and put on KCl 10 meq daily  Chest pain was mild resolved, no longer feeling tachycardia   Has some brown urine, violent vomiting, ache in legs and diarrhea, dizziness  Feeling better today  Echo on 14th showed normal EF  She has labs from PCP post dc and notes pre admit that are reviewed and to be scanned   Before dc she was back in SR  Today sounds RRR and EKG today sinus with PAC  Has not seen pulmonary before and asthma she feels is worse   CTA chest with no PE  Avoid beta blocker due to lung disease  Echo 11-14-18- EF 55-60% and mild LAE    Assessment/Plan:     1. Paroxysmal atrial flutter  -now in SR, continue diltiazem as HR was quite rapid when first occurred  Continue [address] for now    2. GI illness ?viral, fu with PCP   3. Hypokalemia fu with PCP  4. HTN ,now with volume depletion, keep fluids up, stop HCTZ  5. Asthma suggest she establish with Pulmonogist    Future Appointments   Date Time Provider Jovana Granado   1/3/2019  3:20 PM Norris Coburn  [address]      Patient Instructions   Stop HCTZ and follow up in 1 month. Follow up with Primary care if vomiting returns. A referral has been sent for you to see [facility]. They will be in contact with you to scheduled an appointment.   If you do not receive a call by next week please contact them at [phone]. Key CAD CHF Meds             apixaban (ELIQUIS) 5 mg tablet  (Taking) Take 1 Tab by mouth every twelve (12) hours. dilTIAZem CD (CARDIZEM CD) 120 mg ER capsule  (Taking) Take 1 Cap by mouth daily. lisinopril (PRINIVIL, ZESTRIL) 10 mg tablet  (Taking) Take 1 Tab by mouth daily. Omega-3 Fatty Acids (FISH OIL) 500 mg cap  (Taking) Take 1 Cap by mouth daily. Impression:   1. Paroxysmal atrial flutter ([address]    2. SOB (shortness of breath)    3. Nausea and vomiting, intractability of vomiting not specified, unspecified vomiting type    4. Hypokalemia    5. Essential hypertension    6. Asthma, unspecified asthma severity, unspecified whether complicated, unspecified whether persistent       Cardiac History:   No specialty comments available. ROS-except as noted above. . A complete cardiac and respiratory are reviewed and negative except as above ; Resp-denies wheezing  or productive cough,. Const- No unusual weight loss or fever; Neuro-no recent seizure or CVA ; GI- No BRBPR, abdom pain, bloating ; - no  hematuria   see supplement sheet, initialed and to be scanned by staff  Past Medical History:   Diagnosis Date    Asthma     Gastrointestinal disorder     diverticulitis    Ovarian cyst       Social Hx= reports that  has never smoked. she has never used smokeless tobacco. She reports that she drinks alcohol. She reports that she does not use drugs. Exam and Labs:    Visit Vitals  /80 (BP 1 Location: Left arm, BP Patient Position: Sitting)   Pulse 73   Resp 18   Ht 5' 7\" (1.702 m)   Wt 212 lb (96.2 kg)   SpO2 96%   BMI 33.20 kg/m²    @Constitutional:  NAD, comfortable  Head: NC,AT. Eyes: No scleral icterus. Neck:  Neck supple. No JVD present. Throat: moist mucous membranes. Chest: Effort normal & normal respiratory excursion . Neurological: alert, conversant and oriented . Skin: Skin is not cold. No obvious systemic rash noted. Not diaphoretic.  No erythema. Psychiatric:  Grossly normal mood and affect. Behavior appears normal. Extremities:  no clubbing or cyanosis. Abdomen: non distended    Lungs:breath sounds normal. No stridor. distress, wheezes or  Rales. Heart:    normal rate, regular rhythm, normal S1, S2, no murmurs, rubs, clicks or gallops , PMI non displaced. occ ectopy  Edema: Edema is none. Lab Results   Component Value Date/Time    Sodium 137 11/18/2018 02:25 AM    Potassium 3.3 (L) 11/18/2018 02:25 AM    Chloride 101 11/18/2018 02:25 AM    CO2 29 11/18/2018 02:25 AM    Anion gap 7 11/18/2018 02:25 AM    Glucose 214 (H) 11/18/2018 02:25 AM    BUN 24 (H) 11/18/2018 02:25 AM    Creatinine 0.77 11/18/2018 02:25 AM    BUN/Creatinine ratio 31 (H) 11/18/2018 02:25 AM    GFR est AA >60 11/18/2018 02:25 AM    GFR est non-AA >60 11/18/2018 02:25 AM    Calcium 8.4 (L) 11/18/2018 02:25 AM      Wt Readings from Last 3 Encounters:   11/26/18 212 lb (96.2 kg)   11/17/18 236 lb 8.9 oz (107.3 kg)   10/16/18 240 lb 8.4 oz (109.1 kg)      BP Readings from Last 3 Encounters:   11/26/18 130/80   11/18/18 168/73   10/16/18 174/81      Current Outpatient Medications   Medication Sig    potassium chloride (KLOR-CON M10) 10 mEq tablet Take  by mouth.  apixaban (ELIQUIS) 5 mg tablet Take 1 Tab by mouth every twelve (12) hours.  dilTIAZem CD (CARDIZEM CD) 120 mg ER capsule Take 1 Cap by mouth daily.  lisinopril (PRINIVIL, ZESTRIL) 10 mg tablet Take 1 Tab by mouth daily.  metFORMIN (GLUCOPHAGE) 500 mg tablet Take 1 Tab by mouth two (2) times daily (with meals).  estrogens, conjugated, (PREMARIN) 0.45 mg tablet Take 0.45 mg by mouth daily.  multivitamin (ONE A DAY) tablet Take 1 Tab by mouth daily.  Omega-3 Fatty Acids (FISH OIL) 500 mg cap Take 1 Cap by mouth daily.  escitalopram oxalate (LEXAPRO) 10 mg tablet Take 10 mg by mouth daily.     albuterol (PROVENTIL HFA, VENTOLIN HFA, PROAIR HFA) 90 mcg/actuation inhaler Take 1 Puff by inhalation every four (4) hours as needed for Wheezing.  dicyclomine (BENTYL) 10 mg capsule Take 10 mg by mouth two (2) times a day.  fluticasone-salmeterol (ADVAIR DISKUS) 100-50 mcg/dose diskus inhaler Take 1 Puff by inhalation every twelve (12) hours. No current facility-administered medications for this visit. Impression see above.

## 2018-11-26 NOTE — PROGRESS NOTES
Visit Vitals  /80 (BP 1 Location: Left arm, BP Patient Position: Sitting)   Pulse 73   Resp 18   Ht 5' 7\" (1.702 m)   Wt 212 lb (96.2 kg)   SpO2 96%   BMI 33.20 kg/m²

## 2018-12-12 ENCOUNTER — TELEPHONE (OUTPATIENT)
Dept: CARDIOLOGY CLINIC | Age: 68
End: 2018-12-12

## 2018-12-12 RX ORDER — LISINOPRIL 10 MG/1
10 TABLET ORAL DAILY
Qty: 90 TAB | Refills: 0 | Status: SHIPPED | OUTPATIENT
Start: 2018-12-12 | End: 2019-02-08

## 2018-12-12 RX ORDER — DILTIAZEM HYDROCHLORIDE 120 MG/1
120 CAPSULE, COATED, EXTENDED RELEASE ORAL DAILY
Qty: 90 CAP | Refills: 0 | Status: SHIPPED | OUTPATIENT
Start: 2018-12-12 | End: 2020-08-31

## 2018-12-12 NOTE — TELEPHONE ENCOUNTER
Pt is calling in regards to meidcations dilTIAZem CD (CARDIZEM CD) 120 mg ER capsule, lisinopril (PRINIVIL, ZESTRIL) 10 mg tablet, and apixaban (ELIQUIS) 5 mg tablet. Pt wanted to know when medications are complete do would patient need to refill medication. If so patient requested can Dr. Nelly Leija refill them for her when time comes. Pt wanted to see if Dr. Nelly Leija received the paperwork she submitted and wanted to know if he completed them. Phone #: [phone]  Thanks.

## 2018-12-12 NOTE — TELEPHONE ENCOUNTER
Attempted to reach patient by telephone. A message was left for return call. Verified patient with two types of identifiers. Patient returned my call and wanted to let Dr. Ana Cristina Pfeiffer know she is feeling better and that she needs refills for eliquis 5mg BID, diltiazem 120mg daily and lisinopril 10mg daily. Refills sent per verbal order by MD. Reminded patient of follow up appointment on 1-3-19. Patient verbalized understanding and will call with any other questions.

## 2018-12-13 ENCOUNTER — TELEPHONE (OUTPATIENT)
Dept: CARDIOLOGY CLINIC | Age: 68
End: 2018-12-13

## 2018-12-14 ENCOUNTER — TELEPHONE (OUTPATIENT)
Dept: CARDIOLOGY CLINIC | Age: 68
End: 2018-12-14

## 2018-12-14 NOTE — TELEPHONE ENCOUNTER
Verified patient with two types of identifiers. Let patient know Dr. Lul Marin signed her papers yesterday and they are ready to picked up at her convenience.

## 2018-12-14 NOTE — TELEPHONE ENCOUNTER
Pt called to see if papers have been signed and if they are ready for   Please call pt [phone].   Thanks

## 2019-01-21 NOTE — TELEPHONE ENCOUNTER
Pharmacy confirmed.    patient also stated shes in a new medication and she believes shes having side effects (terrible cough) she would like a recommendation   Phone: [phone]

## 2019-01-22 ENCOUNTER — TELEPHONE (OUTPATIENT)
Dept: CARDIOLOGY CLINIC | Age: 69
End: 2019-01-22

## 2019-01-22 DIAGNOSIS — I10 ESSENTIAL HYPERTENSION: ICD-10-CM

## 2019-01-22 DIAGNOSIS — I48.0 PAF (PAROXYSMAL ATRIAL FIBRILLATION) (HCC): Primary | ICD-10-CM

## 2019-01-22 NOTE — TELEPHONE ENCOUNTER
Called in for a refill of eliquis and was c/o a cough and wants a new medication. Pt is currently on Lisinopril. Attempted to reach patient by telephone. A message was left for return call.

## 2019-01-22 NOTE — TELEPHONE ENCOUNTER
Request for eliquis 5mg twice a day. Last office visit 11/26/18, next office visit not scheduled.  Refills per verbal order from Dr. Shayna Hinojosa.

## 2019-01-24 NOTE — TELEPHONE ENCOUNTER
Verified patient with two types of identifiers. Patient returning Cindy's call. She states she stopped taking lisinopril and cough resolved. Will check with MD about a different medication. Patient verbalized understanding and will call with any other questions.

## 2019-02-08 ENCOUNTER — TELEPHONE (OUTPATIENT)
Dept: CARDIOLOGY CLINIC | Age: 69
End: 2019-02-08

## 2019-02-08 RX ORDER — OLMESARTAN MEDOXOMIL 20 MG/1
20 TABLET ORAL DAILY
Qty: 90 TAB | Refills: 3 | Status: SHIPPED | OUTPATIENT
Start: 2019-02-08 | End: 2019-05-16

## 2019-02-08 NOTE — TELEPHONE ENCOUNTER
Verified patient with two types of identifiers. Patient returning my call. Gave her Dr. Jair Wong message about starting Benicar 20mg daily in place of lisinopril. Educated patient about possible side effects and to monitor her BP with goal of systolic less than 850. Also patient reports she missed her last follow up related to illness. Rescheduled her appointment. Patient verbalized understanding and will call with any other questions.

## 2019-02-25 ENCOUNTER — OFFICE VISIT (OUTPATIENT)
Dept: CARDIOLOGY CLINIC | Age: 69
End: 2019-02-25

## 2019-02-25 VITALS
WEIGHT: 228.8 LBS | HEIGHT: 67 IN | SYSTOLIC BLOOD PRESSURE: 130 MMHG | HEART RATE: 60 BPM | RESPIRATION RATE: 16 BRPM | OXYGEN SATURATION: 97 % | DIASTOLIC BLOOD PRESSURE: 60 MMHG | BODY MASS INDEX: 35.91 KG/M2

## 2019-02-25 DIAGNOSIS — I48.0 PAF (PAROXYSMAL ATRIAL FIBRILLATION) (HCC): Primary | ICD-10-CM

## 2019-02-25 DIAGNOSIS — E87.6 HYPOKALEMIA: ICD-10-CM

## 2019-02-25 DIAGNOSIS — R07.2 SUBSTERNAL PRECORDIAL CHEST PAIN: ICD-10-CM

## 2019-02-25 DIAGNOSIS — I10 ESSENTIAL HYPERTENSION: ICD-10-CM

## 2019-02-25 DIAGNOSIS — I48.92 PAROXYSMAL ATRIAL FLUTTER (HCC): ICD-10-CM

## 2019-02-25 PROBLEM — E66.01 SEVERE OBESITY (HCC): Status: ACTIVE | Noted: 2019-02-25

## 2019-02-25 NOTE — PATIENT INSTRUCTIONS
You will be scheduled for nuclear stress testing after your appointment today. Wear comfortable clothing (shorts or pants with a shirt or blouse- no underwire bras) and walking or athletic shoes. Do not eat or drink anything, except water, for at least 2 hours prior to your appointment. Avoid tobacco products for at least 6 hours prior to your test.    Do not eat or drink anything containing caffeine, including but not limited to the following: chocolate, regular and decaffeinated coffee, soft drinks, or tea for at least 24 hours prior to your test.    Do take all  scheduled medications prior to your test.     If you are a diabetic, please ask your physician how to adjust your food and insulin prior to your test.     Please bring all medications you are currently taking. Your test will be performed on a one day protocol. This is determined by your height, weight, and other risk factors. For a 1 day test, please allow for 4 hours in the office that day. The radioactive isotope used for your testing is different from any of the dyes that are commonly used in x-ray procedures, and is ordered specially for your test. Please call to cancel or reschedule your appointment at least 24 hours prior to your scheduled appointment to avoid being billed for the expensive isotope. Our office will call you with test results. Follow up with Dr. Lisbet Mensah in 6 months.

## 2019-02-25 NOTE — PROGRESS NOTES
Paddy Watson     1950       Marcelino Rodriguez MD, [facility]    PCP is Other, MD Sophy   [address] [facility] [place]  [facility]  HPI:  Date of Visit- 2-25-19  Paddy Watson is a 71 y.o. female   Three month fu   Has hospital stay with asthma and developed atrial flutter with RVR   Treated with diltiazem and put on [facility] Eliqukay[facility]nn  Had HTN and lisinopril and HCTZ started  Before hospital stay not on  Cardiac or HTN meds other than HCTZ  Her echo was normal    Returns. .. With some chest pain occasionally lasting few minutes about once a month or more  Sometimes with stress or exertion  Mild to moderate  tachycardia and some heart palpitation  Denies  edema, syncope or shortness of breath at rest    Had leg and back pain with injury and has had injections  Assessment/Plan:     1. Paroxysmal atrial flutter  -now in SR, continue diltiazem as HR was quite rapid when first occurred  Continue [facility] for now  Some CP/fatigue still, will check nuclear stress test with her flutter and may change med management     2. GI illness ?viral, likely trigger, resolved  3. HTN ,off HCTZ, on Benicar  BP Readings from Last 3 Encounters:   02/25/19 130/60   11/26/18 130/80   11/18/18 168/73      4. chest pain workup     Future Appointments   Date Time Provider Jovana Granado   3/15/2019  9:00 AM NUCLEAR, [address]   8/26/2019 10:40 AM Marcelino Rodriguez  [address]      Patient Instructions   You will be scheduled for nuclear stress testing after your appointment today. Our office will call you with test results. Follow up with Dr. Crystal Perea in 6 months. Key CAD CHF Meds             apixaban (ELIQUIS) 5 mg tablet  (Taking) Take 1 Tab by mouth every twelve (12) hours. olmesartan (BENICAR) 20 mg tablet  (Taking) Take 1 Tab by mouth daily. dilTIAZem CD (CARDIZEM CD) 120 mg ER capsule  (Taking) Take 1 Cap by mouth daily.             1. PAF (paroxysmal atrial fibrillation) ([facility] [address]    2. Substernal precordial chest pain    3. Essential hypertension    4. Paroxysmal atrial flutter (HCC)    5. Hypokalemia         Cardiac History:   No specialty comments available. ROS-except as noted above. . A complete cardiac and respiratory are reviewed and negative except as above ; Resp-denies wheezing  or productive cough,. Const- No unusual weight loss or fever; Neuro-no recent seizure or CVA ; GI- No BRBPR, abdom pain, bloating ; - no  hematuria   see supplement sheet, initialed and to be scanned by staff    Past Medical History:   Diagnosis Date    Asthma     Gastrointestinal disorder     diverticulitis    Ovarian cyst       Social Hx= reports that  has never smoked. she has never used smokeless tobacco. She reports that she drinks alcohol. She reports that she does not use drugs. Exam and Labs:    Visit Vitals  /60 (BP 1 Location: Left arm, BP Patient Position: Sitting)   Pulse 60   Resp 16   Ht 5' 7\" (1.702 m)   Wt 228 lb 12.8 oz (103.8 kg)   SpO2 97%   BMI 35.84 kg/m²      Constitutional:  NAD, comfortable  Head: NC,AT. Eyes: No scleral icterus. Neck:  Neck supple. No JVD present. Throat: moist mucous membranes. Chest: Effort normal & normal respiratory excursion . Neurological: alert, conversant and oriented . Skin: Skin is not cold. No obvious systemic rash noted. Not diaphoretic. No erythema. Psychiatric:  Grossly normal mood and affect. Behavior appears normal. Extremities:  no clubbing or cyanosis. Abdomen: non distended    Lungs:breath sounds normal. No stridor. distress, wheezes or  Rales. Heart:    normal rate, regular rhythm, normal S1, S2, no murmurs, rubs, clicks or gallops , PMI non displaced. Edema: Edema is none.     Lab Results   Component Value Date/Time    TSH 1.17 11/16/2018 10:13 AM      Lab Results   Component Value Date/Time    Sodium 137 11/18/2018 02:25 AM    Potassium 3.3 (L) 11/18/2018 02:25 AM    Chloride 101 11/18/2018 02:25 AM CO2 29 11/18/2018 02:25 AM    Anion gap 7 11/18/2018 02:25 AM    Glucose 214 (H) 11/18/2018 02:25 AM    BUN 24 (H) 11/18/2018 02:25 AM    Creatinine 0.77 11/18/2018 02:25 AM    BUN/Creatinine ratio 31 (H) 11/18/2018 02:25 AM    GFR est AA >60 11/18/2018 02:25 AM    GFR est non-AA >60 11/18/2018 02:25 AM    Calcium 8.4 (L) 11/18/2018 02:25 AM      Wt Readings from Last 3 Encounters:   02/25/19 228 lb 12.8 oz (103.8 kg)   11/26/18 212 lb (96.2 kg)   11/17/18 236 lb 8.9 oz (107.3 kg)      BP Readings from Last 3 Encounters:   02/25/19 130/60   11/26/18 130/80   11/18/18 168/73        Current Outpatient Medications   Medication Sig    apixaban (ELIQUIS) 5 mg tablet Take 1 Tab by mouth every twelve (12) hours.  olmesartan (BENICAR) 20 mg tablet Take 1 Tab by mouth daily.  dilTIAZem CD (CARDIZEM CD) 120 mg ER capsule Take 1 Cap by mouth daily.  potassium chloride (KLOR-CON M10) 10 mEq tablet Take  by mouth.  metFORMIN (GLUCOPHAGE) 500 mg tablet Take 1 Tab by mouth two (2) times daily (with meals).  estrogens, conjugated, (PREMARIN) 0.45 mg tablet Take 0.45 mg by mouth daily.  multivitamin (ONE A DAY) tablet Take 1 Tab by mouth daily.  escitalopram oxalate (LEXAPRO) 10 mg tablet Take 10 mg by mouth daily.  albuterol (PROVENTIL HFA, VENTOLIN HFA, PROAIR HFA) 90 mcg/actuation inhaler Take 1 Puff by inhalation every four (4) hours as needed for Wheezing.  dicyclomine (BENTYL) 10 mg capsule Take 10 mg by mouth two (2) times a day.  fluticasone-salmeterol (ADVAIR DISKUS) 100-50 mcg/dose diskus inhaler Take 1 Puff by inhalation every twelve (12) hours. No current facility-administered medications for this visit. Impression see above.

## 2019-03-04 NOTE — PROGRESS NOTES
Physical Therapy Daily Progress Note        Patient: Kelsey Nicholson   : 1957  Diagnosis/ICD-10 Code:  Acute right ankle pain [M25.571]  Referring practitioner: Anoop Trinh MD  Date of Initial Visit: Type: THERAPY  Noted: 2019  Today's Date: 3/4/2019  Patient seen for 4 sessions         Kelsey Nicholson reports:       Subjective   Patient reports she has tried to rest her foot more but has been busy making it difficult. States she is wanting to try walking with a cane.    Objective   See Exercise, Manual, and Modality Logs for complete treatment.       Assessment/Plan  Patient had improved ROM following manual therapy and improved scar mobility.  She continues to have high pain levels which is limiting her WB and ROM tolerance.  Progressed HEP with weight shifts.  Educated on the requirements to safely transition to ambulation with a cane.  Progress per Plan of Care           Manual Therapy:    13     mins  47775;  Therapeutic Exercise:    15     mins  79174;     Neuromuscular Tiki:        mins  12398;    Therapeutic Activity:          mins  59556;     Gait Training:           mins  67051;     Ultrasound:          mins  45178;    Electrical Stimulation:    15     mins  49687 ( );  Dry Needling          mins self-pay    Timed Treatment:   28   mins   Total Treatment:     47   mins    Kenyatta Green PT  Physical Therapist                   Faxed referral and office note to [facility] at fax number [phone]. Fax confirmation received.

## 2019-03-06 PROBLEM — R07.2 SUBSTERNAL PRECORDIAL CHEST PAIN: Status: ACTIVE | Noted: 2019-03-06

## 2019-03-06 PROBLEM — I48.0 PAF (PAROXYSMAL ATRIAL FIBRILLATION) (HCC): Status: ACTIVE | Noted: 2019-03-06

## 2019-03-06 PROBLEM — I48.92 PAROXYSMAL ATRIAL FLUTTER (HCC): Status: ACTIVE | Noted: 2019-03-06

## 2019-03-06 PROBLEM — I10 ESSENTIAL HYPERTENSION: Status: ACTIVE | Noted: 2019-03-06

## 2019-04-04 ENCOUNTER — TELEPHONE (OUTPATIENT)
Dept: CARDIOLOGY CLINIC | Age: 69
End: 2019-04-04

## 2019-04-04 NOTE — TELEPHONE ENCOUNTER
Two patient identifiers verified. Per MD patient called and given results of nuclear stress test. Confirmed follow up. Patient verbalized understanding and denies any further questions or concerns at this time.

## 2019-05-16 ENCOUNTER — TELEPHONE (OUTPATIENT)
Dept: CARDIOLOGY CLINIC | Age: 69
End: 2019-05-16

## 2019-05-16 RX ORDER — HYDROCHLOROTHIAZIDE 25 MG/1
25 TABLET ORAL DAILY
COMMUNITY
Start: 2019-05-16 | End: 2020-08-31 | Stop reason: SDUPTHER

## 2019-05-16 NOTE — TELEPHONE ENCOUNTER
Patient is calling again to discuss her medication. She states that her PCP thinks that olmesartan 20mg may be causing muscle spasms and severe pain in her legs. She would like to know how to stop that medication safely. Please advise.     Phone #: [phone]  Thanks

## 2019-05-16 NOTE — TELEPHONE ENCOUNTER
Pt called stating that she would like to discuss two of her prescriptions.   Phone #[phone]  Thanks

## 2019-05-16 NOTE — TELEPHONE ENCOUNTER
Can stop  BP Readings from Last 6 Encounters:   02/25/19 130/60   11/26/18 130/80   11/18/18 168/73   10/16/18 174/81   10/28/17 149/65   08/07/17 152/82      Will need to replace likely with her high BP  So substitute Atacand 16 mg daily-small 2 week supply at first then refill longer If improved  Future Appointments   Date Time Provider [facility]   6/17/2019 10:30 AM [facility]   8/26/2019 10:40 AM Meggan Richards  [address]      See me next week if not better

## 2019-05-16 NOTE — TELEPHONE ENCOUNTER
Verified patient with two types of identifiers. Gave patient Dr. Thelma Toth message. She states her PCP started her on HCTZ 25mg daily. Will updated her medication list and let MD know. Patient verbalized understanding and will call with any other questions.

## 2019-05-21 NOTE — TELEPHONE ENCOUNTER
Verified patient with two types of identifiers. Patient returning my call. She will keep a BP diary and call me next week to update me. Patient verbalized understanding and will call with any other questions.

## 2019-10-10 ENCOUNTER — TELEPHONE (OUTPATIENT)
Dept: CARDIOLOGY CLINIC | Age: 69
End: 2019-10-10

## 2019-10-10 NOTE — TELEPHONE ENCOUNTER
Pt came to wrong location for appt on 10/10/19, was scheduled for SP. I offered to let her drive to the location she stated she was not interested going to that area and was not familiar with the location. Pt would really like to be added to the [place] schedule she stated that she did not have time constraints on days that cannot be scheduled on.      Call back: [phone]

## 2019-11-25 ENCOUNTER — HOSPITAL ENCOUNTER (EMERGENCY)
Age: 69
Discharge: HOME | End: 2019-11-26
Attending: EMERGENCY MEDICINE | Admitting: EMERGENCY MEDICINE
Payer: COMMERCIAL

## 2019-11-25 ENCOUNTER — APPOINTMENT (OUTPATIENT)
Dept: GENERAL RADIOLOGY | Age: 69
End: 2019-11-25
Attending: EMERGENCY MEDICINE
Payer: COMMERCIAL

## 2019-11-25 DIAGNOSIS — L97.921 ULCER OF LEFT LOWER EXTREMITY, LIMITED TO BREAKDOWN OF SKIN (HCC): Primary | ICD-10-CM

## 2019-11-25 LAB
ALBUMIN SERPL-MCNC: 3.8 G/DL (ref 3.5–5)
ALBUMIN/GLOB SERPL: 1.2 {RATIO} (ref 1.1–2.2)
ALP SERPL-CCNC: 89 U/L (ref 45–117)
ALT SERPL-CCNC: 25 U/L (ref 12–78)
ANION GAP SERPL CALC-SCNC: 5 MMOL/L (ref 5–15)
AST SERPL-CCNC: 13 U/L (ref 15–37)
BASOPHILS # BLD: 0.1 K/UL (ref 0–0.1)
BASOPHILS NFR BLD: 1 % (ref 0–1)
BILIRUB SERPL-MCNC: 0.3 MG/DL (ref 0.2–1)
BUN SERPL-MCNC: 29 MG/DL (ref 6–20)
BUN/CREAT SERPL: 25 (ref 12–20)
CALCIUM SERPL-MCNC: 9.2 MG/DL (ref 8.5–10.1)
CHLORIDE SERPL-SCNC: 105 MMOL/L (ref 97–108)
CO2 SERPL-SCNC: 28 MMOL/L (ref 21–32)
COMMENT, HOLDF: NORMAL
CREAT SERPL-MCNC: 1.18 MG/DL (ref 0.55–1.02)
DIFFERENTIAL METHOD BLD: ABNORMAL
EOSINOPHIL # BLD: 0.4 K/UL (ref 0–0.4)
EOSINOPHIL NFR BLD: 5 % (ref 0–7)
ERYTHROCYTE [DISTWIDTH] IN BLOOD BY AUTOMATED COUNT: 13.3 % (ref 11.5–14.5)
GLOBULIN SER CALC-MCNC: 3.3 G/DL (ref 2–4)
GLUCOSE SERPL-MCNC: 133 MG/DL (ref 65–100)
HCT VFR BLD AUTO: 33.9 % (ref 35–47)
HGB BLD-MCNC: 10.5 G/DL (ref 11.5–16)
IMM GRANULOCYTES # BLD AUTO: 0 K/UL (ref 0–0.04)
IMM GRANULOCYTES NFR BLD AUTO: 0 % (ref 0–0.5)
LYMPHOCYTES # BLD: 2.6 K/UL (ref 0.8–3.5)
LYMPHOCYTES NFR BLD: 31 % (ref 12–49)
MCH RBC QN AUTO: 29 PG (ref 26–34)
MCHC RBC AUTO-ENTMCNC: 31 G/DL (ref 30–36.5)
MCV RBC AUTO: 93.6 FL (ref 80–99)
MONOCYTES # BLD: 0.5 K/UL (ref 0–1)
MONOCYTES NFR BLD: 5 % (ref 5–13)
NEUTS SEG # BLD: 4.9 K/UL (ref 1.8–8)
NEUTS SEG NFR BLD: 58 % (ref 32–75)
NRBC # BLD: 0 K/UL (ref 0–0.01)
NRBC BLD-RTO: 0 PER 100 WBC
PLATELET # BLD AUTO: 265 K/UL (ref 150–400)
PMV BLD AUTO: 11.1 FL (ref 8.9–12.9)
POTASSIUM SERPL-SCNC: 4.2 MMOL/L (ref 3.5–5.1)
PROT SERPL-MCNC: 7.1 G/DL (ref 6.4–8.2)
RBC # BLD AUTO: 3.62 M/UL (ref 3.8–5.2)
SAMPLES BEING HELD,HOLD: NORMAL
SODIUM SERPL-SCNC: 138 MMOL/L (ref 136–145)
WBC # BLD AUTO: 8.4 K/UL (ref 3.6–11)

## 2019-11-25 PROCEDURE — 96375 TX/PRO/DX INJ NEW DRUG ADDON: CPT

## 2019-11-25 PROCEDURE — 36415 COLL VENOUS BLD VENIPUNCTURE: CPT

## 2019-11-25 PROCEDURE — 73590 X-RAY EXAM OF LOWER LEG: CPT

## 2019-11-25 PROCEDURE — 96365 THER/PROPH/DIAG IV INF INIT: CPT

## 2019-11-25 PROCEDURE — 80053 COMPREHEN METABOLIC PANEL: CPT

## 2019-11-25 PROCEDURE — 85025 COMPLETE CBC W/AUTO DIFF WBC: CPT

## 2019-11-25 PROCEDURE — 74011250636 HC RX REV CODE- 250/636: Performed by: EMERGENCY MEDICINE

## 2019-11-25 PROCEDURE — 99283 EMERGENCY DEPT VISIT LOW MDM: CPT

## 2019-11-25 RX ORDER — CLINDAMYCIN HYDROCHLORIDE 300 MG/1
300 CAPSULE ORAL 4 TIMES DAILY
Qty: 28 CAP | Refills: 0 | Status: SHIPPED | OUTPATIENT
Start: 2019-11-25 | End: 2019-12-02

## 2019-11-25 RX ORDER — KETOROLAC TROMETHAMINE 30 MG/ML
15 INJECTION, SOLUTION INTRAMUSCULAR; INTRAVENOUS
Status: COMPLETED | OUTPATIENT
Start: 2019-11-25 | End: 2019-11-25

## 2019-11-25 RX ORDER — HYDROCODONE BITARTRATE AND ACETAMINOPHEN 5; 325 MG/1; MG/1
1 TABLET ORAL
Qty: 10 TAB | Refills: 0 | Status: SHIPPED | OUTPATIENT
Start: 2019-11-25 | End: 2019-11-28

## 2019-11-25 RX ORDER — CLINDAMYCIN PHOSPHATE 600 MG/50ML
600 INJECTION INTRAVENOUS
Status: COMPLETED | OUTPATIENT
Start: 2019-11-25 | End: 2019-11-25

## 2019-11-25 RX ADMIN — KETOROLAC TROMETHAMINE 15 MG: 30 INJECTION, SOLUTION INTRAMUSCULAR at 22:35

## 2019-11-25 RX ADMIN — CLINDAMYCIN IN 5 PERCENT DEXTROSE 600 MG: 12 INJECTION, SOLUTION INTRAVENOUS at 22:34

## 2019-11-26 VITALS
SYSTOLIC BLOOD PRESSURE: 110 MMHG | WEIGHT: 219.58 LBS | OXYGEN SATURATION: 99 % | HEART RATE: 88 BPM | BODY MASS INDEX: 34.46 KG/M2 | TEMPERATURE: 97.7 F | RESPIRATION RATE: 18 BRPM | HEIGHT: 67 IN | DIASTOLIC BLOOD PRESSURE: 58 MMHG

## 2019-11-26 NOTE — DISCHARGE INSTRUCTIONS
Patient Education      Blood work and xrays looked good today. I am changing your antibiotics to clindamycin, which we gave you IV tonight. Please call and arrange close follow up with your primary care doctor for Wednesday for recheck, as well as the wound clinic for further management. Return to the ED if you develop a fever, increased redness/drainage/pain at the ulcer site. Venous Skin Ulcer: Care Instructions  Your Care Instructions  A venous skin ulcer is a shallow wound that develops when the leg veins do not move blood back to the heart normally. Your veins have one-way valves that keep blood flowing toward the heart. When the valves are damaged, the blood can back up and pool in the vein. The blood may leak out of the vein into tissue around the vein. The tissue can break down and form an ulcer. The first sign of a venous skin ulcer is skin that turns dark red or purple over the area where the blood is leaking out of the vein. The skin also may become thick, dry, and itchy. Without treatment, an ulcer may form. The ulcer may be painful. Your leg also may swell and ache. If the ulcer becomes infected, the infection may cause an odor, and pus may drain from the ulcer. The area around the ulcer also may be more tender and red. Follow-up care is a key part of your treatment and safety. Be sure to make and go to all appointments, and call your doctor if you are having problems. It's also a good idea to know your test results and keep a list of the medicines you take. How can you care for yourself at home? · Follow your doctor's instructions on how to clean the ulcer and change the bandage. · If your doctor prescribed antibiotics, take them as directed. Do not stop taking them just because you feel better. You need to take the full course of antibiotics. · Lift your legs above the level of your heart as often as possible. For example, lie down and then prop up your legs with pillows.   · Wear compression stockings or bandages. They help the blood circulate in your legs. And they help prevent blood from pooling in your legs. But there are different types of stockings, and they need to fit right. So your doctor will recommend what you need. · After your ulcer has healed, continue to wear compression stockings. Take them off only when you bathe and sleep. Compression helps your blood circulate and helps prevent other ulcers from forming. · Walk daily. Walking helps your blood circulation. When should you call for help? Call your doctor now or seek immediate medical care if:    · You have symptoms of infection, such as:  ? Increased pain, swelling, warmth, or redness. ? Red streaks leading from the ulcer. ? Pus draining from the ulcer. ? A fever.    Watch closely for changes in your health, and be sure to contact your doctor if:    · Your ulcer is not healing.     · You have new ulcers.     · The ulcer starts to bleed, and blood soaks through the bandage. Oozing small amounts of a mix of blood and fluid is normal.     · You have new bleeding.     · You do not get better as expected. Where can you learn more? Go to http://miranda-maren.info/. Enter F359 in the search box to learn more about \"Venous Skin Ulcer: Care Instructions. \"  Current as of: June 26, 2019  Content Version: 12.2  © 4432-9648 [employer]. Care instructions adapted under license by [employer] (which disclaims liability or warranty for this information). If you have questions about a medical condition or this instruction, always ask your healthcare professional. [place] [address] any warranty or liability for your use of this information.

## 2019-11-26 NOTE — ED TRIAGE NOTES
Referred from PCP with wound L lower leg. Has been treated over 10 days with 2 different antibiotics. Was debrided on Thurs. But last night started getting more painful.

## 2019-11-26 NOTE — ED NOTES
I have reviewed discharge instructions with the patient. The patient verbalized understanding. Patient ambulatory off unit in no acute signs of distress.

## 2019-11-26 NOTE — ED PROVIDER NOTES
HPI     80-year-old female with diabetes, asthma, a flutter, hypertension, presents to the ED for by her primary care doctor, [facility], for a wound on her left lower leg that is not healing. She has been on 10 days of Bactrim and Augmentin without relief. .  The pain is getting worse. She denies fever, nausea vomiting, chills, she has had decreased appetite. She was also being treated for cystitis, those symptoms have resolved. She has no chest pain cough cold congestion shortness of breath. She has no abdominal pain nausea vomiting or diarrhea. She has not been referred to the wound clinic. She states the swelling has improved slightly. It is the deep pain that has gotten worse in the wound has not  healed    Past Medical History:   Diagnosis Date    Asthma     Essential hypertension 3/6/2019    Gastrointestinal disorder     diverticulitis    Hypokalemia 8/4/2016    Ovarian cyst     PAF (paroxysmal atrial fibrillation) ([facility] [address] 3/6/2019    Paroxysmal atrial flutter ([facility] [address] 3/6/2019       Past Surgical History:   Procedure Laterality Date    HX APPENDECTOMY      HX CHOLECYSTECTOMY      HX HYSTERECTOMY      partial, has ovaries    HX ORTHOPAEDIC      tennis elbow         History reviewed. No pertinent family history.     Social History     Socioeconomic History    Marital status:      Spouse name: Not on file    Number of children: Not on file    Years of education: Not on file    Highest education level: Not on file   Occupational History    Not on file   Social Needs    Financial resource strain: Not on file    Food insecurity:     Worry: Not on file     Inability: Not on file    Transportation needs:     Medical: Not on file     Non-medical: Not on file   Tobacco Use    Smoking status: Never Smoker    Smokeless tobacco: Never Used   Substance and Sexual Activity    Alcohol use: Yes     Comment: once per 6 months at social event    Drug use: No    Sexual activity: Not on file Lifestyle    Physical activity:     Days per week: Not on file     Minutes per session: Not on file    Stress: Not on file   Relationships    Social connections:     Talks on phone: Not on file     Gets together: Not on file     Attends [employer] service: Not on file     Active member of club or organization: Not on file     Attends meetings of clubs or organizations: Not on file     Relationship status: Not on file    Intimate partner violence:     Fear of current or ex partner: Not on file     Emotionally abused: Not on file     Physically abused: Not on file     Forced sexual activity: Not on file   Other Topics Concern    Not on file   Social History Narrative    Not on file         ALLERGIES: Garlic and Penicillin g    Review of Systems   Constitutional: Negative for fever. HENT: Negative for congestion. Eyes: Negative for visual disturbance. Respiratory: Negative for cough and shortness of breath. Cardiovascular: Negative for chest pain. Gastrointestinal: Negative for abdominal pain, nausea and vomiting. Genitourinary: Negative for dysuria. Musculoskeletal: Negative for gait problem. Skin: Positive for wound. Neurological: Negative for headaches. Psychiatric/Behavioral: Negative for dysphoric mood. Vitals:    11/25/19 2018   BP: 114/61   Pulse: 85   Resp: 16   Temp: 97.5 °F (36.4 °C)   SpO2: 95%   Weight: 99.6 kg (219 lb 9.3 oz)   Height: 5' 7\" (1.702 m)            Physical Exam  Constitutional:       General: She is not in acute distress. Appearance: She is well-developed. HENT:      Head: Normocephalic and atraumatic. Mouth/Throat:      Pharynx: No oropharyngeal exudate. Eyes:      General: No scleral icterus. Right eye: No discharge. Left eye: No discharge. Pupils: Pupils are equal, round, and reactive to light. Neck:      Musculoskeletal: Normal range of motion and neck supple. Vascular: No JVD.    Cardiovascular:      Rate and Rhythm: Normal rate and regular rhythm. Heart sounds: Normal heart sounds. No murmur. Pulmonary:      Effort: Pulmonary effort is normal. No respiratory distress. Breath sounds: Normal breath sounds. No stridor. No wheezing or rales. Chest:      Chest wall: No tenderness. Abdominal:      General: Bowel sounds are normal. There is no distension. Palpations: Abdomen is soft. There is no mass. Tenderness: There is no tenderness. There is no guarding or rebound. Musculoskeletal: Normal range of motion. Skin:     General: Skin is warm and dry. Capillary Refill: Capillary refill takes less than 2 seconds. Findings: No rash. Comments: 2-3cm oval ulceration to lateral lower left leg. No drainage, mild erythema, no warmth. Mild swelling to left lower leg. No calf pain/tenderness   Neurological:      Mental Status: She is oriented to person, place, and time. Psychiatric:         Behavior: Behavior normal.         Thought Content: Thought content normal.         Judgment: Judgment normal.          MDM       Procedures    Labs look good, xray neg. Vitals stable. Will change to [place] and close follow up with pcp and referral to wound clinic.  Return if fever, increased redness/drainage/pain, etc.

## 2019-12-13 ENCOUNTER — HOSPITAL ENCOUNTER (OUTPATIENT)
Dept: WOUND CARE | Age: 69
Discharge: HOME | End: 2019-12-13
Payer: COMMERCIAL

## 2019-12-13 VITALS — HEART RATE: 80 BPM | DIASTOLIC BLOOD PRESSURE: 77 MMHG | TEMPERATURE: 98.3 F | SYSTOLIC BLOOD PRESSURE: 145 MMHG

## 2019-12-13 PROBLEM — S81.852S: Status: ACTIVE | Noted: 2019-12-13
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
## 2019-12-13 PROBLEM — L97.922 CHRONIC ULCER OF LEFT LEG WITH FAT LAYER EXPOSED (HCC): Status: ACTIVE | Noted: 2019-12-13

## 2019-12-13 PROCEDURE — 99212 OFFICE O/P EST SF 10 MIN: CPT

## 2019-12-13 PROCEDURE — 97597 DBRDMT OPN WND 1ST 20 CM/<: CPT

## 2019-12-13 NOTE — WOUND CARE
12/13/19 8847 Wound Leg lower Left;Lateral  
Date First Assessed/Time First Assessed: 12/13/19 0854   Present on Hospital Admission: Yes  Primary Wound Type: Traumatic  Location: Leg lower  Wound Location Orientation: Left;Lateral  
Dressing Type Applied Honey;Gauze;Gauze wrap (lynda) Wound Procedure Type Debridement- Surgical  
Procedure Time Out 3345 Consent Obtained  Yes Procedure Bleeding Minimal  
Procedure Hemostasis  Pressure Type of Tissue Removed  Devitalized Procedure Instrument  Blade Procedure Pain Scale Numeric 8/10 Debridement Procedure Performed by Adolfo Heart Discharge Condition: Stable Pain: 0 Ambulatory Status: Walking Discharge Destination: Home Transportation: Car Accompanied by: Self Discharge instructions reviewed with Patient and copy or written instructions have been provided. All questions/concerns have been addressed at this time.

## 2019-12-13 NOTE — DISCHARGE INSTRUCTIONS
Discharge Instructions for  [place] [id number] [facility], [address]  Telephone: [phone]    NAME:  Axel Jennings  YOB: 1950  MEDICAL RECORD NUMBER:  [id number]  DATE:  12/13/2019    Wound Cleansing:   Do not scrub or use excessive force. Cleanse wound prior to applying a clean dressing with:  [x] Cleanse wound with Mild Soap & Water [facility]    [x] Don't  Shower      Topical Treatments:  Do not apply lotions, creams, or ointments to wound bed unless directed. Dressings:           Wound Location left lower leg  [] Apply Primary Dressing:  [x] MediHoney Gel     [x] Cover and Secure with:     [x] Gauze [x] Jhonathan  [x] Cover Roll Tape    Avoid contact of tape with skin. [x] Change dressing: [x] Daily        Dietary:  [x] Diet as tolerated: [x] Calorie Diabetic Diet: [x] No Added Salt:  [x] Increase Protein:      Activity:  [x] Activity as tolerated:               Return Appointment:  [x] Return Appointment: With  James Wallace   in [facility])  [] Ordered tests:      [facility]: Should you experience any significant changes in your wound(s) or have questions about your wound care, please contact the [address] [place] [address] at [address] [place] [address] 8:00 am - 4:30. If you need help with your wound outside these hours and cannot wait until we are again available, contact your PCP or go to the hospital emergency room. PLEASE NOTE: IF YOU ARE UNABLE TO OBTAIN WOUND SUPPLIES, CONTINUE TO USE THE SUPPLIES YOU HAVE AVAILABLE UNTIL YOU ARE ABLE TO REACH US. IT IS MOST IMPORTANT TO KEEP THE WOUND COVERED AT ALL TIMES.       Physician Signature:_______________________    Date: ___________ Time:  ____________    Patient's Signature ___________________    Date: ______________ Time: ____________    Clinician's Signature_____________________    Date:_______________Time: _____________

## 2019-12-13 NOTE — H&P
Patient presents to wound clinic for: Milton Ghosh is a 71 y.o. female who presents for initial evaluation of the following: left lower extremity wound due to a cat bite several months ago. Patient notes that the wound has been slow to heal, and that it is been infected on several occasions. Patient has been on multiple oral antibiotics. Currently is dressing the area with mupirocin and covering with a dry bandage daily. Relates mild pain to the area. Pertinent Medical History:  Past Medical History:   Diagnosis Date    Asthma     Diabetes ([address]     Essential hypertension 3/6/2019    Gastrointestinal disorder     diverticulitis    Hypokalemia 8/4/2016    Ovarian cyst     PAF (paroxysmal [facility]) ([facility] [address] 3/6/2019    Paroxysmal atrial flutter ([facility] [address] 3/6/2019     Past Surgical History:   Procedure Laterality Date    HX APPENDECTOMY      HX CHOLECYSTECTOMY      HX HYSTERECTOMY      partial, has ovaries    HX ORTHOPAEDIC      tennis elbow     Prior to Admission medications    Medication Sig Start Date End Date Taking? Authorizing Provider   gabapentin (NEURONTIN) 100 mg capsule TAKE 2 CAPS BY MOUTH NIGHTLY. 7/15/19  Yes Renny Onofre MD   metFORMIN (GLUCOPHAGE) 1,000 mg tablet Take 1 Tab by mouth two (2) times daily (with meals). 6/19/19  Yes Renny Onofre MD   atorvastatin (LIPITOR) 40 mg tablet Take 1 Tab by mouth daily. 6/19/19  Yes Renny Onofre MD   hydroCHLOROthiazide (HYDRODIURIL) 25 mg tablet Take 1 Tab by mouth daily. 5/16/19  Yes Leila Campa MD   apixaban (ELIQUIS) 5 mg tablet Take 1 Tab by mouth every twelve (12) hours. 2/8/19  Yes Leila Campa MD   dilTIAZem CD (CARDIZEM CD) 120 mg ER capsule Take 1 Cap by mouth daily. 12/12/18  Yes Leila Campa MD   multivitamin (ONE A DAY) tablet Take 1 Tab by mouth daily.    Yes Provider, Historical   albuterol (PROVENTIL HFA, VENTOLIN HFA, PROAIR HFA) 90 mcg/actuation inhaler Take 1 Puff by inhalation every four (4) hours as needed for Wheezing. Yes Provider, Historical   dicyclomine (BENTYL) 10 mg capsule Take 10 mg by mouth two (2) times a day. Yes Provider, Historical   fluticasone-salmeterol (ADVAIR DISKUS) 100-50 mcg/dose diskus inhaler Take 1 Puff by inhalation every twelve (12) hours. Yes Other, MD Sophy   albuterol (PROVENTIL HFA, VENTOLIN HFA, PROAIR HFA) 90 mcg/actuation inhaler Take 2 Puffs by inhalation every six (6) hours as needed for Wheezing. 6/17/19   Caryle Cumber, MD   potassium chloride (KLOR-CON M10) 10 mEq tablet Take  by mouth. Provider, Historical     Allergies   Allergen Reactions    Garlic Hives    Penicillin G Hives     History reviewed. No pertinent family history.   Social History     Socioeconomic History    Marital status:      Spouse name: Not on file    Number of children: Not on file    Years of education: Not on file    Highest education level: Not on file   Occupational History    Not on file   Social Needs    Financial resource strain: Not on file    Food insecurity:     Worry: Not on file     Inability: Not on file    Transportation needs:     Medical: Not on file     Non-medical: Not on file   Tobacco Use    Smoking status: Never Smoker    Smokeless tobacco: Never Used   Substance and Sexual Activity    Alcohol use: Yes     Comment: once per 6 months at social event    Drug use: No    Sexual activity: Not on file   Lifestyle    Physical activity:     Days per week: Not on file     Minutes per session: Not on file    Stress: Not on file   Relationships    Social connections:     Talks on phone: Not on file     Gets together: Not on file     Attends Yarsani service: Not on file     Active member of club or organization: Not on file     Attends meetings of clubs or organizations: Not on file     Relationship status: Not on file    Intimate partner violence:     Fear of current or ex partner: Not on file     Emotionally abused: Not on file     Physically abused: Not on file     Forced sexual activity: Not on file   Other Topics Concern     Service Not Asked    Blood Transfusions Not Asked    Caffeine Concern Not Asked    Occupational Exposure Not Asked    Hobby Hazards Not Asked    Sleep Concern Not Asked    Stress Concern Not Asked    Weight Concern Not Asked    Special Diet Not Asked    Back Care Not Asked    Exercise Not Asked    Bike Helmet Not Asked   [address] Not Asked    Self-Exams Not Asked   Social History Narrative    Not on file       Vitals:    12/13/19 0836   BP: 145/77   Pulse: 80   Temp: 98.3 °F (36.8 °C)       Review of Systems:    Gen: No fever, chills, malaise, weight loss/gain. Heent: No headache, rhinorrhea, epistaxis, ear pain, hearing loss, sinus pain, neck pain/stiffness, sore throat. Heart: No chest pain, palpitations, ROSE, pnd, or orthopnea. Resp: No cough, hemoptysis, wheezing and shortness of breath. GI: No nausea, vomiting, diarrhea, constipation, melena or hematochezia. : No urinary obstruction, dysuria or hematuria. Derm: see below  Musc/skeletal: no bone or joint complains. Vasc: No edema, cyanosis or claudication. Endo: No heat/cold intolerance, no polyuria,polydipsia or polyphagia. Neuro: No unilateral weakness, numbness, tingling. No seizures. Heme: No easy bruising or bleeding.     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   -Patient to apply medihoney and gauze to wound for the next week to soften up the adherent fibrotic tissue so that wound can be adequately debrided at next visit.  -Follow-up 1 week.

## 2019-12-13 NOTE — WOUND CARE
12/13/19 3751 Wound Leg lower Left;Lateral  
Date First Assessed/Time First Assessed: 12/13/19 0854   Present on Hospital Admission: Yes  Primary Wound Type: Traumatic  Location: Leg lower  Wound Location Orientation: Left;Lateral  
Dressing Status Removed Dressing Type Adhesive wound dressing (Band-Aid) Wound Length (cm) 1.7 cm Wound Width (cm) 1.1 cm Wound Depth (cm) 0.1 cm Wound Surface Area (cm^2) 1.87 cm^2 Wound Volume (cm^3) 0.19 cm^3 Condition of Base Pink;Slough Condition of Edges Open Drainage Amount Small Drainage Color Serosanguinous Wound Odor None Tara-wound Assessment Dry;Pink Cleansing and Cleansing Agents  Normal saline Visit Vitals /77 Pulse 80 Temp 98.3 °F (36.8 °C) Breastfeeding No

## 2019-12-13 NOTE — WOUND CARE
Clinic Level of Care Assessment NAME:  Dunia Carbajal YOB: 1950 GENDER: female MEDICAL RECORD NUMBER:  [id number] DATE:  12/13/2019 Wound Count Document in [facility] Number of Wounds Assessed Points No Wounds/Ulcers []   0 Less than Three Wounds/Ulcers [x]   1  
3-6 Wounds/Ulcers []   2 Greater than 6 Wounds/Ulcers []   3 Ambulation Status Document in Coord/EDISON/Mobility tab Status Definition Points Independent Independently able to ambulate. Fully able (without any assistance) to get on/off exam table/chair. [x]   0 Minimal Physical Assistance Requires physical assistance of one person to ambulate and/or position patient to be examined. Includes necessary physical assistance to position lower extremities on/off stool. []   1 Moderate Physical Assistance Requires at least one staff member to physically assist patient in ambulating into treatment room, and on/off exam table. []   2 Full Assistance Requires assistance of at least two staff members to transfer patient into treatment room and/or on/off exam table/chair. \"Total Transfer\". []   3 Dressing Complexity Document in [facility] and Write Appropriate Order Complexity Definition Points No Dressing  []   0 Simple Minimal, simple dressing. i.e. Band-aid, gauze, simple wrap. []   1 Intermediate Moderately complicated requiring licensed personnel to apply i.e. collagen matrix, ointments, gels, alginates. [x]   2 Complex Complicated requiring licensed personnel to apply dressings 6 or more wounds. []   3 Teaching Effort Document in Education Tab Effort Definition Points No Teaching  []   0 Simple Reinforce two or less topics. Document in Education navigator. [x]   1 Intermediate Reinforce three to five topics and/or one additional  
new topic. Document in Education navigator. []   2 Complex Teach more than one new topic. New patient information packet reviewed and/or reinforce more than three topics. Document in Education navigator. HBO initial instruction. []   3 Patient Assessment and Planning Planning Definition Points Simple Multiple System Simple: Simple follow-up with routine assessment and planning. If Discharged, instructions and long term/follow-up care given to patient/caregiver. Discharged, instructions and/or After Visit Summary given to patient/caregiver and instructions completed. [x]   1 Intermediate Multiple System Intermediate: Contact with outside resources; i.e. Telephone calls to home health, [facility]. May include filling out forms and writing letters, arranging transportation, communication with insurance , vendors, etc.  Discharged, instructions and/or After Visit Summary given to patient/caregiver and instructions completed. []   2 Complex Multiple System Complex: Full, comprehensive assessment and planning. Follow the entire navigator under Wound Visit charting filling out each tab which includes OP Adm Database Screening, Education and CarePlan  HBO risk assessment completed. Discharged, instructions and/or After Visit Summary given to patient/caregiver and instructions completed. []   3 Is this the Patient's First Visit to the [facility] [address] Yes Is this Patient Established @ [facility] 
Yes Clinical Level of Care Points  0-2  Level 1 [] Points  3-5  Level 2 [x] Points  6-9  Level 3 [] Points  10-12  Level 4 [] Points  13-15  Level 5 [] Electronically signed by Iftikhar Ybarra RN on 12/13/2019 at 11:38 AM

## 2019-12-20 ENCOUNTER — HOSPITAL ENCOUNTER (OUTPATIENT)
Dept: WOUND CARE | Age: 69
Discharge: HOME | End: 2019-12-20
Payer: COMMERCIAL

## 2019-12-20 VITALS
SYSTOLIC BLOOD PRESSURE: 127 MMHG | RESPIRATION RATE: 18 BRPM | HEART RATE: 71 BPM | TEMPERATURE: 97.9 F | DIASTOLIC BLOOD PRESSURE: 71 MMHG

## 2019-12-20 PROCEDURE — 97597 DBRDMT OPN WND 1ST 20 CM/<: CPT

## 2019-12-20 NOTE — WOUND CARE
12/20/19 2537 Wound Leg lower Left;Lateral  
Date First Assessed/Time First Assessed: 12/13/19 0854   Present on Hospital Admission: Yes  Primary Wound Type: Traumatic  Location: Leg lower  Wound Location Orientation: Left;Lateral  
Dressing Status Removed Dressing Type Gauze Wound Length (cm) 1.2 cm Wound Width (cm) 0.7 cm Wound Depth (cm) 0.1 cm Wound Surface Area (cm^2) 0.84 cm^2 Wound Volume (cm^3) 0.08 cm^3 Drainage Amount Moderate Drainage Color Serosanguinous; Tan  
Wound Odor None Tara-wound Assessment Dry;Pink Cleansing and Cleansing Agents  Normal saline Visit Vitals /71 Pulse 71 Temp 97.9 °F (36.6 °C) Resp 18

## 2019-12-20 NOTE — WOUND CARE
12/20/19 0920 Wound Leg lower Left;Lateral  
Date First Assessed/Time First Assessed: 12/13/19 0854   Present on Hospital Admission: Yes  Primary Wound Type: Traumatic  Location: Leg lower  Wound Location Orientation: Left;Lateral  
Dressing Changed Changed/New Dressing Type Applied Honey;Gauze;Gauze wrap (lynda) Procedure Tolerated Well Discharge Condition: Stable Pain: 0 Ambulatory Status: Walking Discharge Destination: Work Transportation: Car Accompanied by: Self Discharge instructions reviewed with Patient and copy or written instructions have been provided. All questions/concerns have been addressed at this time.

## 2019-12-20 NOTE — DISCHARGE INSTRUCTIONS
Discharge Instructions for  [address] [facility], [address]  Telephone: [phone]    NAME:  Patito Edwards  YOB: 1950  MEDICAL RECORD NUMBER:  [id number]  DATE:  12/20/2019    Wound Cleansing:   Do not scrub or use excessive force. Cleanse wound prior to applying a clean dressing with:  [x] Cleanse wound with Mild Soap & Water [employer] [facility]    [x] Don't  Shower      Topical Treatments:  Do not apply lotions, creams, or ointments to wound bed unless directed. Dressings:           Wound Location left lower leg  [] Apply Primary Dressing:  [x] MediHoney Gel     [x] Cover and Secure with:     [x] Gauze [x] Jhonathan  [x] Cover Roll Tape    Avoid contact of tape with skin. [x] Change dressing: [x] Daily        Dietary:  [x] Diet as tolerated: [x] Calorie Diabetic Diet: [x] No Added Salt:  [x] Increase Protein:      Activity:  [x] Activity as tolerated:               Return Appointment:  [x] Return Appointment: With  Corinne Polite   in [facility])  [] Ordered tests:      Electronically signed on 12/20/2019 at 9:15 AM by ESDRAS [place] Information: Should you experience any significant changes in your wound(s) or have questions about your wound care, please contact the [address] [place] [address] at [address] [place] [address] 8:00 am - 4:30. If you need help with your wound outside these hours and cannot wait until we are again available, contact your PCP or go to the hospital emergency room. PLEASE NOTE: IF YOU ARE UNABLE TO OBTAIN WOUND SUPPLIES, CONTINUE TO USE THE SUPPLIES YOU HAVE AVAILABLE UNTIL YOU ARE ABLE TO REACH US. IT IS MOST IMPORTANT TO KEEP THE WOUND COVERED AT ALL TIMES.       Physician Signature:_______________________    Date: ___________ Time:  ____________    Patient's Signature ___________________    Date: ______________ Time: ____________    Clinician's Signature_____________________    Date:_______________Time: _____________

## 2019-12-20 NOTE — PROGRESS NOTES
Patient presents to wound clinic for: Veronica Phipps is a 71 y.o. female who presents for initial evaluation of the following: left lower extremity wound due to a cat bite several months ago. Patient notes that the wound has been slow to heal, and that it is been infected on several occasions. Patient has been on multiple oral antibiotics. Patient has been dressing the area with medihoney. She notes that the pain to the wound area has resolved. Pertinent Medical History:  Past Medical History:   Diagnosis Date    Asthma     Diabetes ([address]     Essential hypertension 3/6/2019    Gastrointestinal disorder     diverticulitis    Hypokalemia 8/4/2016    Ovarian cyst     PAF (paroxysmal [facility]) ([facility] [address] 3/6/2019    Paroxysmal atrial flutter ([address] 3/6/2019     Past Surgical History:   Procedure Laterality Date    HX APPENDECTOMY      HX CHOLECYSTECTOMY      HX HYSTERECTOMY      partial, has ovaries    HX ORTHOPAEDIC      tennis elbow     Prior to Admission medications    Medication Sig Start Date End Date Taking? Authorizing Provider   gabapentin (NEURONTIN) 100 mg capsule TAKE 2 CAPS BY MOUTH NIGHTLY. 7/15/19   Hanna Salvador MD   metFORMIN (GLUCOPHAGE) 1,000 mg tablet Take 1 Tab by mouth two (2) times daily (with meals). 6/19/19   Hanna Salvador MD   atorvastatin (LIPITOR) 40 mg tablet Take 1 Tab by mouth daily. 6/19/19   Hanna Salvador MD   albuterol (PROVENTIL HFA, VENTOLIN HFA, PROAIR HFA) 90 mcg/actuation inhaler Take 2 Puffs by inhalation every six (6) hours as needed for Wheezing. 6/17/19   Hanna Salvador MD   hydroCHLOROthiazide (HYDRODIURIL) 25 mg tablet Take 1 Tab by mouth daily. 5/16/19   Marcelino Rodriguez MD   apixaban (ELIQUIS) 5 mg tablet Take 1 Tab by mouth every twelve (12) hours. 2/8/19   Marcelino Rodriguez MD   dilTIAZem CD (CARDIZEM CD) 120 mg ER capsule Take 1 Cap by mouth daily.  12/12/18   Marcelino Rodriguez MD   potassium chloride (KLOR-CON M10) 10 mEq tablet Take  by mouth.    Provider, Historical   multivitamin (ONE A DAY) tablet Take 1 Tab by mouth daily. Provider, Historical   albuterol (PROVENTIL HFA, VENTOLIN HFA, PROAIR HFA) 90 mcg/actuation inhaler Take 1 Puff by inhalation every four (4) hours as needed for Wheezing. Provider, Historical   dicyclomine (BENTYL) 10 mg capsule Take 10 mg by mouth two (2) times a day. Provider, Historical   fluticasone-salmeterol (ADVAIR DISKUS) 100-50 mcg/dose diskus inhaler Take 1 Puff by inhalation every twelve (12) hours. Other, MD Sophy     Allergies   Allergen Reactions    Garlic Hives    Penicillin G Hives     No family history on file.   Social History     Socioeconomic History    Marital status:      Spouse name: Not on file    Number of children: Not on file    Years of education: Not on file    Highest education level: Not on file   Occupational History    Not on file   Social Needs    Financial resource strain: Not on file    Food insecurity:     Worry: Not on file     Inability: Not on file    Transportation needs:     Medical: Not on file     Non-medical: Not on file   Tobacco Use    Smoking status: Never Smoker    Smokeless tobacco: Never Used   Substance and Sexual Activity    Alcohol use: Yes     Comment: once per 6 months at social event    Drug use: No    Sexual activity: Not on file   Lifestyle    Physical activity:     Days per week: Not on file     Minutes per session: Not on file    Stress: Not on file   Relationships    Social connections:     Talks on phone: Not on file     Gets together: Not on file     Attends Pentecostal service: Not on file     Active member of club or organization: Not on file     Attends meetings of clubs or organizations: Not on file     Relationship status: Not on file    Intimate partner violence:     Fear of current or ex partner: Not on file     Emotionally abused: Not on file     Physically abused: Not on file     Forced sexual activity: Not on file Other Topics Concern     Service Not Asked    Blood Transfusions Not Asked    Caffeine Concern Not Asked    Occupational Exposure Not Asked    Hobby Hazards Not Asked    Sleep Concern Not Asked    Stress Concern Not Asked    Weight Concern Not Asked    Special Diet Not Asked    Back Care Not Asked    Exercise Not Asked    Bike Helmet Not Asked   [address] Not Asked    Self-Exams Not Asked   Social History Narrative    Not on file       Vitals:    12/20/19 0848   BP: 127/71   Pulse: 71   Resp: 18   Temp: 97.9 °F (36.6 °C)       Review of Systems:    Gen: No fever, chills, malaise, weight loss/gain. Heent: No headache, rhinorrhea, epistaxis, ear pain, hearing loss, sinus pain, neck pain/stiffness, sore throat. Heart: No chest pain, palpitations, ROSE, pnd, or orthopnea. Resp: No cough, hemoptysis, wheezing and shortness of breath. GI: No nausea, vomiting, diarrhea, constipation, melena or hematochezia. : No urinary obstruction, dysuria or hematuria. Derm: see below  Musc/skeletal: no bone or joint complains. Vasc: No edema, cyanosis or claudication. Endo: No heat/cold intolerance, no polyuria,polydipsia or polyphagia. Neuro: No unilateral weakness, numbness, tingling. No seizures. Heme: No easy bruising or bleeding. Wound Description:   Wound Type: Traumatic   Indication: Chronic >30days   Status: Improved   Measurements:    Wound Length:  1.2      Wound Width : 0.7      Wound Depth : 0.1   Tissue Type:   Epithelial: without necrosis  Granulation: without necrosis but yellowed fibrosis  Slough: yes adherent to wound base  Gangrene: yes   Drainage: Stable    Debridement: performed today for wound healing    Character of Ulcer: Improved    Indication for Debridement: Slough, Exudate, Abnormal wound edge and Abnormal Wound base     Pre debridement measurements: 1.2 cm x 0.7 cm x 0.1 cm    Risks of procedure were discussed with patient. Consent has been signed. Anesthetic: Lidocaine 4% topical cream     Level of Debridement: Fibrin/ Exudate/ Debris/ Biofilm     Tissue and/or Material removed: [address] and Subcutaneous    Type of Tissue: Non- Viable      Pre-debridement severity: Fat Layer Exposed     Post debridement severity: Fat Layer exposed    Instrument(s) used: Curette    Bleeding controlled with: Pressure    Estimated blood loss: Minimal    Pre debridement measurements: 1.3 cm x 0.8 cm x 0.2 cm    Post procedural pain: mild    Patient tolerated procedure well. 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  -Wound debrided as noted above  -Patient to continue to  apply medihoney and gauze to wound   -Follow-up 2 weeks.

## 2020-01-03 ENCOUNTER — HOSPITAL ENCOUNTER (OUTPATIENT)
Dept: WOUND CARE | Age: 70
Discharge: HOME | End: 2020-01-03
Payer: COMMERCIAL

## 2020-01-03 VITALS
SYSTOLIC BLOOD PRESSURE: 130 MMHG | TEMPERATURE: 97.4 F | HEART RATE: 74 BPM | DIASTOLIC BLOOD PRESSURE: 67 MMHG | RESPIRATION RATE: 18 BRPM

## 2020-01-03 PROCEDURE — 97597 DBRDMT OPN WND 1ST 20 CM/<: CPT

## 2020-01-03 NOTE — WOUND CARE
01/03/20 1023   Wound Leg lower Left;Lateral   Date First Assessed/Time First Assessed: 12/13/19 0854   Present on Hospital Admission: Yes  Primary Wound Type: Traumatic  Location: Leg lower  Wound Location Orientation: Left;Lateral   Dressing Type Applied Xeroform  (foam border dressing)   Wound Procedure Type Debridement- Surgical   Procedure Time Out 1024   Consent Obtained  Yes   Procedure Bleeding Minimal   Procedure Hemostasis  Pressure   Type of Tissue Removed  Devitalized   Procedure Instrument  Curette   Procedure Pain Scale Numeric 0/10   Debridement Procedure Performed by Chely Reveles       Discharge Condition: Stable     Pain: 0    Ambulatory Status: Walking    Discharge Destination: Work     Transportation: Car    Accompanied by: Self     Discharge instructions reviewed with Patient and copy or written instructions have been provided. All questions/concerns have been addressed at this time.

## 2020-01-03 NOTE — WOUND CARE
01/03/20 0945   Wound Leg lower Left;Lateral   Date First Assessed/Time First Assessed: 12/13/19 0854   Present on Hospital Admission: Yes  Primary Wound Type: Traumatic  Location: Leg lower  Wound Location Orientation: Left;Lateral   Dressing Status Removed   Dressing Type Honey;Gauze   Wound Length (cm) 0.9 cm   Wound Width (cm) 0.5 cm   Wound Depth (cm) 0.1 cm   Wound Surface Area (cm^2) 0.45 cm^2   Wound Volume (cm^3) 0.04 cm^3   Condition of Base Slough   Condition of Edges Open   Drainage Amount Small   Drainage Color Serosanguinous   Wound Odor None   Cleansing and Cleansing Agents  Normal saline   Procedure Tolerated Well       Visit Vitals  /67   Pulse 74   Temp 97.4 °F (36.3 °C)   Resp 18

## 2020-01-03 NOTE — PROGRESS NOTES
Patient presents to wound clinic for: Logan Schwartz is a 71 y.o. female who presents for initial evaluation of the following: left lower extremity wound due to a cat bite several months ago. Patient notes that the wound has been slow to heal, and that it is been infected on several occasions. Patient has been on multiple oral antibiotics. Patient has been dressing the area with medihoney. She denies any pain to the area. Pertinent Medical History:  Past Medical History:   Diagnosis Date    Asthma     Diabetes ([address]     Essential hypertension 3/6/2019    Gastrointestinal disorder     diverticulitis    Hypokalemia 8/4/2016    Ovarian cyst     PAF (paroxysmal atrial fibrillation) ([address] 3/6/2019    Paroxysmal atrial flutter ([address] 3/6/2019     Past Surgical History:   Procedure Laterality Date    HX APPENDECTOMY      HX CHOLECYSTECTOMY      HX HYSTERECTOMY      partial, has ovaries    HX ORTHOPAEDIC      tennis elbow     Prior to Admission medications    Medication Sig Start Date End Date Taking? Authorizing Provider   gabapentin (NEURONTIN) 100 mg capsule TAKE 2 CAPS BY MOUTH NIGHTLY. 7/15/19   Miriam Forbes MD   metFORMIN (GLUCOPHAGE) 1,000 mg tablet Take 1 Tab by mouth two (2) times daily (with meals). 6/19/19   Miriam Forbes MD   atorvastatin (LIPITOR) 40 mg tablet Take 1 Tab by mouth daily. 6/19/19   Miriam Forbes MD   albuterol (PROVENTIL HFA, VENTOLIN HFA, PROAIR HFA) 90 mcg/actuation inhaler Take 2 Puffs by inhalation every six (6) hours as needed for Wheezing. 6/17/19   Miriam Forbes MD   hydroCHLOROthiazide (HYDRODIURIL) 25 mg tablet Take 1 Tab by mouth daily. 5/16/19   Marcelino Rodriguez MD   apixaban (ELIQUIS) 5 mg tablet Take 1 Tab by mouth every twelve (12) hours. 2/8/19   Marcelino Rodriguez MD   dilTIAZem CD (CARDIZEM CD) 120 mg ER capsule Take 1 Cap by mouth daily. 12/12/18   Marcelino Rodriguez MD   potassium chloride (KLOR-CON M10) 10 mEq tablet Take  by mouth.     Provider, Historical   multivitamin (ONE A DAY) tablet Take 1 Tab by mouth daily. Provider, Historical   albuterol (PROVENTIL HFA, VENTOLIN HFA, PROAIR HFA) 90 mcg/actuation inhaler Take 1 Puff by inhalation every four (4) hours as needed for Wheezing. Provider, Historical   dicyclomine (BENTYL) 10 mg capsule Take 10 mg by mouth two (2) times a day. Provider, Historical   fluticasone-salmeterol (ADVAIR DISKUS) 100-50 mcg/dose diskus inhaler Take 1 Puff by inhalation every twelve (12) hours. Other, MD Sophy     Allergies   Allergen Reactions    Garlic Hives    Penicillin G Hives     No family history on file.   Social History     Socioeconomic History    Marital status:      Spouse name: Not on file    Number of children: Not on file    Years of education: Not on file    Highest education level: Not on file   Occupational History    Not on file   Social Needs    Financial resource strain: Not on file    Food insecurity:     Worry: Not on file     Inability: Not on file    Transportation needs:     Medical: Not on file     Non-medical: Not on file   Tobacco Use    Smoking status: Never Smoker    Smokeless tobacco: Never Used   Substance and Sexual Activity    Alcohol use: Yes     Comment: once per 6 months at social event    Drug use: No    Sexual activity: Not on file   Lifestyle    Physical activity:     Days per week: Not on file     Minutes per session: Not on file    Stress: Not on file   Relationships    Social connections:     Talks on phone: Not on file     Gets together: Not on file     Attends Spiritism service: Not on file     Active member of club or organization: Not on file     Attends meetings of clubs or organizations: Not on file     Relationship status: Not on file    Intimate partner violence:     Fear of current or ex partner: Not on file     Emotionally abused: Not on file     Physically abused: Not on file     Forced sexual activity: Not on file   Other Topics Concern [address] Service Not Asked    Blood Transfusions Not Asked    Caffeine Concern Not Asked    Occupational Exposure Not Asked    Hobby Hazards Not Asked    Sleep Concern Not Asked    Stress Concern Not Asked    Weight Concern Not Asked    Special Diet Not Asked    Back Care Not Asked    Exercise Not Asked    Bike Helmet Not Asked   [address] Not Asked    Self-Exams Not Asked   Social History Narrative    Not on file       Vitals:    01/03/20 0943   BP: 130/67   Pulse: 74   Resp: 18   Temp: 97.4 °F (36.3 °C)       Review of Systems:    Gen: No fever, chills, malaise, weight loss/gain. Heent: No headache, rhinorrhea, epistaxis, ear pain, hearing loss, sinus pain, neck pain/stiffness, sore throat. Heart: No chest pain, palpitations, ROSE, pnd, or orthopnea. Resp: No cough, hemoptysis, wheezing and shortness of breath. GI: No nausea, vomiting, diarrhea, constipation, melena or hematochezia. : No urinary obstruction, dysuria or hematuria. Derm: see below  Musc/skeletal: no bone or joint complains. Vasc: No edema, cyanosis or claudication. Endo: No heat/cold intolerance, no polyuria,polydipsia or polyphagia. Neuro: No unilateral weakness, numbness, tingling. No seizures. Heme: No easy bruising or bleeding. Wound Description:   Wound Type: Traumatic   Indication: Chronic >30days   Status: Improved   Measurements:    Wound Length:  0.9      Wound Width : 1.5    Wound Depth : 0.1   Tissue Type:   Epithelial: without necrosis  Granulation: without necrosis but yellowed fibrosis  Slough: yes adherent to wound base  Gangrene: yes   Drainage: Stable    Debridement: performed today for wound healing    Character of Ulcer: Improved    Indication for Debridement: Slough, Exudate, Abnormal wound edge and Abnormal Wound base     Pre debridement measurements:0.9 cm x 0.5 cm x 0.1 cm    Risks of procedure were discussed with patient. Consent has been signed.      Anesthetic: Lidocaine 4% topical cream     Level of Debridement: Fibrin/ Exudate/ Debris/ Biofilm     Tissue and/or Material removed: Fibrin/ Slough and Subcutaneous    Type of Tissue: Non- Viable      Pre-debridement severity: Fat Layer Exposed     Post debridement severity: Fat Layer exposed    Instrument(s) used: Curette    Bleeding controlled with: Pressure    Estimated blood loss: Minimal    Pre debridement measurements: 1.0 cm x 0.6  x 0.2 cm    Post procedural pain: mild    Patient tolerated procedure well. 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  -Wound debrided as noted above  -Patient to apply xeroform, gauze to area every other day. -Follow-up 2 weeks.

## 2020-01-03 NOTE — DISCHARGE INSTRUCTIONS
Discharge Instructions for  [address] [facility], [address]  Telephone: [phone]    NAME:  Logan Schwartz  YOB: 1950  MEDICAL RECORD NUMBER:  [id number]  DATE:  01/03/2020    Wound Cleansing:   Do not scrub or use excessive force. Cleanse wound prior to applying a clean dressing with:  [x] Cleanse wound with Mild Soap & Water [facility] and [facility]    [x] Don't  Shower      Topical Treatments:  Do not apply lotions, creams, or ointments to wound bed unless directed. Dressings:           Wound Location left lower leg  [] Apply Primary Dressing:  [x] xeroform     [x] Cover and Secure with:     [x] Gauze [x] Jhonathan  [x] Cover Roll Tape    Avoid contact of tape with skin. [x] Change dressing: [x]  Every other day     Dietary:  [x] Diet as tolerated: [x] Calorie Diabetic Diet: [x] No Added Salt:  [x] Increase Protein:      Activity:  [x] Activity as tolerated:               Return Appointment:  [x] Return Appointment: With  Leretha Goltz   in 2 [facility])     Electronically signed on 01/03/2020 at 10:20 AM by ESDRAS Smith, [place] Information: Should you experience any significant changes in your wound(s) or have questions about your wound care, please contact the [address] [place] [address] at [address] [place] [address] 8:00 am - 4:30. If you need help with your wound outside these hours and cannot wait until we are again available, contact your PCP or go to the hospital emergency room. PLEASE NOTE: IF YOU ARE UNABLE TO OBTAIN WOUND SUPPLIES, CONTINUE TO USE THE SUPPLIES YOU HAVE AVAILABLE UNTIL YOU ARE ABLE TO REACH US. IT IS MOST IMPORTANT TO KEEP THE WOUND COVERED AT ALL TIMES.       Physician Signature:_______________________    Date: ___________ Time:  ____________    Patient's Signature ___________________    Date: ______________ Time: ____________    Clinician's Signature_____________________    Date:_______________Time: _____________

## 2020-01-17 ENCOUNTER — HOSPITAL ENCOUNTER (OUTPATIENT)
Dept: WOUND CARE | Age: 70
Discharge: HOME | End: 2020-01-17
Payer: COMMERCIAL

## 2020-01-17 VITALS
TEMPERATURE: 97.5 F | RESPIRATION RATE: 18 BRPM | SYSTOLIC BLOOD PRESSURE: 136 MMHG | HEART RATE: 67 BPM | DIASTOLIC BLOOD PRESSURE: 74 MMHG

## 2020-01-17 PROCEDURE — 97597 DBRDMT OPN WND 1ST 20 CM/<: CPT

## 2020-01-17 NOTE — WOUND CARE
01/17/20 1030 Wound Leg lower Left;Lateral  
Date First Assessed/Time First Assessed: 12/13/19 0854   Present on Hospital Admission: Yes  Primary Wound Type: Traumatic  Location: Leg lower  Wound Location Orientation: Left;Lateral  
Dressing Type Applied Honey; Foam 
(single tubigrip) Wound Procedure Type Debridement- Surgical  
Procedure Time Out 9391 Consent Obtained  Yes Procedure Bleeding Minimal  
Procedure Hemostasis  Pressure Type of Tissue Removed  Devitalized Procedure Instrument  Blade Procedure Pain Scale Numeric 0/10 Debridement Procedure Performed by Kelsey Henderson Discharge Condition: Stable Pain: 0 Ambulatory Status: Walking Discharge Destination: Work Transportation: Car Accompanied by: Self Discharge instructions reviewed with Patient and copy or written instructions have been provided. All questions/concerns have been addressed at this time.

## 2020-01-17 NOTE — DISCHARGE INSTRUCTIONS
Discharge Instructions for  [address] [facility], [address]  Telephone: [phone]    NAME:  Logan Schwartz  YOB: 1950  MEDICAL RECORD NUMBER:  [id number]  DATE:  01/17/2020    Wound Cleansing:   Do not scrub or use excessive force. Cleanse wound prior to applying a clean dressing with:  [x] Cleanse wound with Mild Soap & Water Mariana Rangel and [facility]    Topical Treatments:  Do not apply lotions, creams, or ointments to wound bed unless directed. Dressings:           Wound Location left lower leg  [] Apply Primary Dressing:  [x] MediHoney Gel     [x] Cover and Secure with:     [x] Gauze [x] Jhonathan  [x] Cover Roll Tape    Avoid contact of tape with skin. [x] Change dressing: [x] Daily        Dietary:  [x] Diet as tolerated: [x] Calorie Diabetic Diet: [x] No Added Salt:  [x] Increase Protein:      Activity:  [x] Activity as tolerated:               Return Appointment:  [x] Return Appointment: With  Leretha Goltz   in 3 [facility])     Electronically signed on 01/17/2020 at 10:35 AM Sandra Ferrari Information: Should you experience any significant changes in your wound(s) or have questions about your wound care, please contact the [address] [place] [address] at [address] [place] [address] 8:00 am - 4:30. If you need help with your wound outside these hours and cannot wait until we are again available, contact your PCP or go to the hospital emergency room. PLEASE NOTE: IF YOU ARE UNABLE TO OBTAIN WOUND SUPPLIES, CONTINUE TO USE THE SUPPLIES YOU HAVE AVAILABLE UNTIL YOU ARE ABLE TO REACH US. IT IS MOST IMPORTANT TO KEEP THE WOUND COVERED AT ALL TIMES.       Physician Signature:_______________________    Date: ___________ Time:  ____________    Patient's Signature ___________________    Date: ______________ Time: ____________    Clinician's Signature_____________________    Date:_______________Time: _____________

## 2020-01-17 NOTE — PROGRESS NOTES
Patient presents to wound clinic for: Mark Worthington is a 71 y.o. female who presents for initial evaluation of the following: left lower extremity wound due to a cat bite several months ago. Patient notes that the wound has been slow to heal, and that it is been infected on several occasions. Patient has been on multiple oral antibiotics. Patient has been dressing the area with xeroform. She notes that it has been slightly irritating the skin and causing more drainage. She denies any pain to the area. Pertinent Medical History:  Past Medical History:   Diagnosis Date    Asthma     Diabetes ([address]     Essential hypertension 3/6/2019    Gastrointestinal disorder     diverticulitis    Hypokalemia 8/4/2016    Ovarian cyst     PAF (paroxysmal atrial fibrillation) ([address] 3/6/2019    Paroxysmal atrial flutter ([address] 3/6/2019     Past Surgical History:   Procedure Laterality Date    HX APPENDECTOMY      HX CHOLECYSTECTOMY      HX HYSTERECTOMY      partial, has ovaries    HX ORTHOPAEDIC      tennis elbow     Prior to Admission medications    Medication Sig Start Date End Date Taking? Authorizing Provider   gabapentin (NEURONTIN) 100 mg capsule TAKE 2 CAPS BY MOUTH NIGHTLY. 7/15/19   Mardee Brunner, MD   metFORMIN (GLUCOPHAGE) 1,000 mg tablet Take 1 Tab by mouth two (2) times daily (with meals). 6/19/19   Mardee Brunner, MD   atorvastatin (LIPITOR) 40 mg tablet Take 1 Tab by mouth daily. 6/19/19   Mardee Brunner, MD   albuterol (PROVENTIL HFA, VENTOLIN HFA, PROAIR HFA) 90 mcg/actuation inhaler Take 2 Puffs by inhalation every six (6) hours as needed for Wheezing. 6/17/19   Mardee Brunner, MD   hydroCHLOROthiazide (HYDRODIURIL) 25 mg tablet Take 1 Tab by mouth daily. 5/16/19   Marcelino Rodriguez MD   apixaban (ELIQUIS) 5 mg tablet Take 1 Tab by mouth every twelve (12) hours. 2/8/19   Marcelino Rodriguez MD   dilTIAZem CD (CARDIZEM CD) 120 mg ER capsule Take 1 Cap by mouth daily.  12/12/18   Kika Bernardo MD potassium chloride (KLOR-CON M10) 10 mEq tablet Take  by mouth. Provider, Historical   multivitamin (ONE A DAY) tablet Take 1 Tab by mouth daily. Provider, Historical   albuterol (PROVENTIL HFA, VENTOLIN HFA, PROAIR HFA) 90 mcg/actuation inhaler Take 1 Puff by inhalation every four (4) hours as needed for Wheezing. Provider, Historical   dicyclomine (BENTYL) 10 mg capsule Take 10 mg by mouth two (2) times a day. Provider, Historical   fluticasone-salmeterol (ADVAIR DISKUS) 100-50 mcg/dose diskus inhaler Take 1 Puff by inhalation every twelve (12) hours. Other, MD Sophy     Allergies   Allergen Reactions    Garlic Hives    Penicillin G Hives     No family history on file.   Social History     Socioeconomic History    Marital status:      Spouse name: Not on file    Number of children: Not on file    Years of education: Not on file    Highest education level: Not on file   Occupational History    Not on file   Social Needs    Financial resource strain: Not on file    Food insecurity:     Worry: Not on file     Inability: Not on file    Transportation needs:     Medical: Not on file     Non-medical: Not on file   Tobacco Use    Smoking status: Never Smoker    Smokeless tobacco: Never Used   Substance and Sexual Activity    Alcohol use: Yes     Comment: once per 6 months at social event    Drug use: No    Sexual activity: Not on file   Lifestyle    Physical activity:     Days per week: Not on file     Minutes per session: Not on file    Stress: Not on file   Relationships    Social connections:     Talks on phone: Not on file     Gets together: Not on file     Attends Yazdanism service: Not on file     Active member of club or organization: Not on file     Attends meetings of clubs or organizations: Not on file     Relationship status: Not on file    Intimate partner violence:     Fear of current or ex partner: Not on file     Emotionally abused: Not on file     Physically abused: Not on file     Forced sexual activity: Not on file   Other Topics Concern     Service Not Asked    Blood Transfusions Not Asked    Caffeine Concern Not Asked    Occupational Exposure Not Asked    Hobby Hazards Not Asked    Sleep Concern Not Asked    Stress Concern Not Asked    Weight Concern Not Asked    Special Diet Not Asked    Back Care Not Asked    Exercise Not Asked    Bike Helmet Not Asked   [address] Not Asked    Self-Exams Not Asked   Social History Narrative    Not on file       Vitals:    01/17/20 1010   BP: 136/74   Pulse: 67   Resp: 18   Temp: 97.5 °F (36.4 °C)       Review of Systems:    Gen: No fever, chills, malaise, weight loss/gain. Heent: No headache, rhinorrhea, epistaxis, ear pain, hearing loss, sinus pain, neck pain/stiffness, sore throat. Heart: No chest pain, palpitations, ROSE, pnd, or orthopnea. Resp: No cough, hemoptysis, wheezing and shortness of breath. GI: No nausea, vomiting, diarrhea, constipation, melena or hematochezia. : No urinary obstruction, dysuria or hematuria. Derm: see below  Musc/skeletal: no bone or joint complains. Vasc: No edema, cyanosis or claudication. Endo: No heat/cold intolerance, no polyuria,polydipsia or polyphagia. Neuro: No unilateral weakness, numbness, tingling. No seizures. Heme: No easy bruising or bleeding. Wound Description:   Wound Type: Traumatic   Indication: Chronic >30days   Status: Improved   Measurements:    Wound Length:  0.7      Wound Width : 0.4    Wound Depth : 0.1   Tissue Type:   Epithelial: without necrosis  Granulation:  No necrosis  Slough:  none      Debridement: not required today for wound healing.     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  -Tubigrip to LLE prn.  -Medihoney gauze to wound  -Follow-up 1 month

## 2020-02-08 DIAGNOSIS — I48.0 PAF (PAROXYSMAL ATRIAL FIBRILLATION) (HCC): ICD-10-CM

## 2020-02-11 NOTE — TELEPHONE ENCOUNTER
Request for Eliquis 5mg BID. Last office visit 2-25-19, next office visit needs to be scheduled, left message with pharmacy.  Refills per verbal order from Dr. Adal Appiah.

## 2020-04-30 DIAGNOSIS — I48.0 PAF (PAROXYSMAL ATRIAL FIBRILLATION) (HCC): ICD-10-CM

## 2020-05-04 RX ORDER — APIXABAN 5 MG/1
TABLET, FILM COATED ORAL
Qty: 180 TAB | Refills: 0 | Status: SHIPPED | OUTPATIENT
Start: 2020-05-04 | End: 2020-07-27

## 2020-05-04 NOTE — TELEPHONE ENCOUNTER
Cardiologist: Dr. Mati Kohler    Last appt: 2/25/2019  No future appointments.     Requested Prescriptions     Signed Prescriptions Disp Refills    Eliquis 5 mg tablet 180 Tab 0     Sig: TAKE 1 TABLET BY MOUTH TWICE A DAY     Authorizing Provider: Trina Aguirre     Ordering User: FAY PIKE         Refills VO per Dr. Mati Kohler  .

## 2020-07-23 DIAGNOSIS — I48.0 PAF (PAROXYSMAL ATRIAL FIBRILLATION) (HCC): ICD-10-CM

## 2020-07-27 RX ORDER — APIXABAN 5 MG/1
TABLET, FILM COATED ORAL
Qty: 60 TAB | Refills: 0 | Status: SHIPPED | OUTPATIENT
Start: 2020-07-27 | End: 2020-08-31

## 2020-08-04 ENCOUNTER — HOSPITAL ENCOUNTER (OUTPATIENT)
Dept: CT IMAGING | Age: 70
Discharge: HOME | End: 2020-08-04
Attending: NURSE PRACTITIONER
Payer: COMMERCIAL

## 2020-08-04 DIAGNOSIS — R10.814 ABDOMINAL TENDERNESS, LEFT LOWER QUADRANT: ICD-10-CM

## 2020-08-04 LAB — CREAT BLD-MCNC: 0.8 MG/DL (ref 0.6–1.3)

## 2020-08-04 PROCEDURE — 74011636320 HC RX REV CODE- 636/320: Performed by: NURSE PRACTITIONER

## 2020-08-04 PROCEDURE — 74177 CT ABD & PELVIS W/CONTRAST: CPT

## 2020-08-04 PROCEDURE — 82565 ASSAY OF CREATININE: CPT

## 2020-08-04 PROCEDURE — 74011000255 HC RX REV CODE- 255: Performed by: NURSE PRACTITIONER

## 2020-08-04 RX ORDER — BARIUM SULFATE 20 MG/ML
900 SUSPENSION ORAL
Status: COMPLETED | OUTPATIENT
Start: 2020-08-04 | End: 2020-08-04

## 2020-08-04 RX ORDER — SODIUM CHLORIDE 0.9 % (FLUSH) 0.9 %
10 SYRINGE (ML) INJECTION
Status: COMPLETED | OUTPATIENT
Start: 2020-08-04 | End: 2020-08-04

## 2020-08-04 RX ADMIN — Medication 10 ML: at 14:08

## 2020-08-04 RX ADMIN — IOPAMIDOL 100 ML: 755 INJECTION, SOLUTION INTRAVENOUS at 14:08

## 2020-08-04 RX ADMIN — BARIUM SULFATE 450 ML: 21 SUSPENSION ORAL at 14:08

## 2020-08-31 ENCOUNTER — OFFICE VISIT (OUTPATIENT)
Dept: CARDIOLOGY CLINIC | Age: 70
End: 2020-08-31
Payer: COMMERCIAL

## 2020-08-31 VITALS
RESPIRATION RATE: 16 BRPM | WEIGHT: 200 LBS | BODY MASS INDEX: 31.39 KG/M2 | SYSTOLIC BLOOD PRESSURE: 138 MMHG | HEIGHT: 67 IN | HEART RATE: 62 BPM | DIASTOLIC BLOOD PRESSURE: 74 MMHG

## 2020-08-31 DIAGNOSIS — I48.92 PAROXYSMAL ATRIAL FLUTTER (HCC): ICD-10-CM

## 2020-08-31 DIAGNOSIS — I10 ESSENTIAL HYPERTENSION: ICD-10-CM

## 2020-08-31 DIAGNOSIS — I48.0 PAF (PAROXYSMAL ATRIAL FIBRILLATION) (HCC): ICD-10-CM

## 2020-08-31 DIAGNOSIS — E87.6 HYPOKALEMIA: ICD-10-CM

## 2020-08-31 DIAGNOSIS — I48.0 PAF (PAROXYSMAL ATRIAL FIBRILLATION) (HCC): Primary | ICD-10-CM

## 2020-08-31 PROCEDURE — 3017F COLORECTAL CA SCREEN DOC REV: CPT | Performed by: SPECIALIST

## 2020-08-31 PROCEDURE — G0463 HOSPITAL OUTPT CLINIC VISIT: HCPCS | Performed by: SPECIALIST

## 2020-08-31 PROCEDURE — 1101F PT FALLS ASSESS-DOCD LE1/YR: CPT | Performed by: SPECIALIST

## 2020-08-31 PROCEDURE — 93005 ELECTROCARDIOGRAM TRACING: CPT | Performed by: SPECIALIST

## 2020-08-31 PROCEDURE — G8536 NO DOC ELDER MAL SCRN: HCPCS | Performed by: SPECIALIST

## 2020-08-31 PROCEDURE — G8754 DIAS BP LESS 90: HCPCS | Performed by: SPECIALIST

## 2020-08-31 PROCEDURE — 93000 ELECTROCARDIOGRAM COMPLETE: CPT | Performed by: SPECIALIST

## 2020-08-31 PROCEDURE — G8417 CALC BMI ABV UP PARAM F/U: HCPCS | Performed by: SPECIALIST

## 2020-08-31 PROCEDURE — G8427 DOCREV CUR MEDS BY ELIG CLIN: HCPCS | Performed by: SPECIALIST

## 2020-08-31 PROCEDURE — G8432 DEP SCR NOT DOC, RNG: HCPCS | Performed by: SPECIALIST

## 2020-08-31 PROCEDURE — G8752 SYS BP LESS 140: HCPCS | Performed by: SPECIALIST

## 2020-08-31 PROCEDURE — 99213 OFFICE O/P EST LOW 20 MIN: CPT | Performed by: SPECIALIST

## 2020-08-31 PROCEDURE — G8400 PT W/DXA NO RESULTS DOC: HCPCS | Performed by: SPECIALIST

## 2020-08-31 PROCEDURE — 1090F PRES/ABSN URINE INCON ASSESS: CPT | Performed by: SPECIALIST

## 2020-08-31 RX ORDER — HYDROCHLOROTHIAZIDE 25 MG/1
25 TABLET ORAL DAILY
Qty: 90 TAB | Refills: 3 | Status: SHIPPED | OUTPATIENT
Start: 2020-08-31 | End: 2021-11-18

## 2020-08-31 RX ORDER — FERROUS SULFATE 137(45) MG
TABLET, EXTENDED RELEASE ORAL
COMMUNITY
End: 2022-02-28

## 2020-08-31 RX ORDER — APIXABAN 5 MG/1
TABLET, FILM COATED ORAL
Qty: 60 TAB | Refills: 3 | Status: SHIPPED | OUTPATIENT
Start: 2020-08-31 | End: 2020-08-31 | Stop reason: SDUPTHER

## 2020-08-31 NOTE — PROGRESS NOTES
Zoila John     1950       Marcelino Dubois MD, [facility] [place]  Date of Visit-8/31/2020   PCP is [facility] [address] [facility] [place]  [facility]  HPI:  Zoila John is a 79 y.o. female   21 month f/u. Pt was in [place] when seen in February of 2019 on [facility]. She had a nuclear stress test scheduled. This was completed in March 2019 and was normal. She has been on Eliquis, HCTZ, and Atorvastatin. Overall the pt states she is doing well. She still has occasional palpitations. She also reports having abdominal pain, nausea, or bloating and aches in her legs when walking. Her palpitations consist of single skipped beats. Sees Dr Denise Hernandez for GI for Diverticulosis  Denies chest pain, edema, syncope or shortness of breath at rest  [facility] stay November 2018; worse shortness of breath  · Developed atrial flutter with RVR ; Treated with diltiazem and put on [employer] Eliquis  · Had HTN and lisinopril and HCTZ started; Before hospital stay not on  Cardiac or HTN meds other than HCTZ  · CTA chest with no PE  · Avoid beta blocker due to lung disease  · Echo 11-14-18- EF 55-60% and mild LAE    EKG- SR 61,  Flat t waves     03/28/19   NUCLEAR CARDIAC STRESS TEST 04/03/2019 4/4/2019    Narrative · Baseline ECG: Normal sinus rhythm, PACs. · Gated SPECT: Left ventricular function post-stress was normal.   Calculated ejection fraction is 66%. · Left ventricular perfusion is normal.  · Negative myocardial perfusion imaging. Myocardial perfusion imaging   supports a low risk stress test.        Signed by: Andria Bucio MD       Assessment/Plan:     1. PAF (paroxysmal atrial fibrillation) (HCC)  No further clinical episodes. We discussed her YARED-VASC score of 3. She is comfortable to stay on the [employer] indefinitely. Her EF has been normal, as well as stress test. She does feel some palpitations. She had no ectopy on EKG but did on physical exam. Now off Diltiazem.      2. Essential hypertension  Good control on single agent HCTZ. Has lost weight. At goal , meds and possible side effects reviewed and patient denies  Key CAD CHF Meds             apixaban (Eliquis) 5 mg tablet (Taking) Take 1 Tab by mouth two (2) times a day. hydroCHLOROthiazide (HYDRODIURIL) 25 mg tablet (Taking) Take 1 Tab by mouth daily. atorvastatin (LIPITOR) 40 mg tablet (Taking) Take 1 Tab by mouth daily. Eliquis 5 mg tablet (Discontinued) TAKE 1 TABLET BY MOUTH TWICE A DAY         BP Readings from Last 6 Encounters:   08/31/20 138/74   01/17/20 136/74   01/03/20 130/67   12/20/19 127/71   12/13/19 145/77   11/25/19 110/58        3. Paroxysmal atrial flutter (HCC)  4. Chest pain  Resolved with normal stress test  F/u in 1 year     Impression:   1. PAF (paroxysmal atrial fibrillation) ([facility]    2. Essential hypertension    3. Paroxysmal atrial flutter ([facility]    4. Hypokalemia       Cardiac History:   No specialty comments available. ROS-except as noted above. . A complete cardiac and respiratory are reviewed and negative except as above ; Resp-denies wheezing  or productive cough,. Const- No unusual weight loss or fever; Neuro-no recent seizure or CVA ; GI- No BRBPR, abdom pain, bloating ; - no  hematuria   see supplement sheet, initialed and to be scanned by staff  Past Medical History:   Diagnosis Date    Asthma     Diabetes ([facility]     Essential hypertension 3/6/2019    Gastrointestinal disorder     diverticulitis    Hypokalemia 8/4/2016    Ovarian cyst     PAF (paroxysmal atrial fibrillation) ([facility] 3/6/2019    Paroxysmal atrial flutter ([facility] 3/6/2019      Social Hx= reports that she has never smoked. She has never used smokeless tobacco. She reports current alcohol use. She reports that she does not use drugs.      Exam and Labs:  /74 (BP 1 Location: Left arm, BP Patient Position: Sitting)   Pulse 62   Resp 16   Ht 5' 7\" (1.702 m)   Wt 200 lb (90.7 kg)   BMI 31.32 kg/m² Constitutional:  NAD, comfortable  Head: NC,AT. Eyes: No scleral icterus. Neck:  Neck supple. No JVD present. Throat: moist mucous membranes. Chest: Effort normal & normal respiratory excursion . Neurological: alert, conversant and oriented . Skin: Skin is not cold. No obvious systemic rash noted. Not diaphoretic. No erythema. Psychiatric:  Grossly normal mood and affect. Behavior appears normal. Extremities:  no clubbing or cyanosis. Abdomen: non distended    Lungs:breath sounds normal. No stridor. distress, wheezes or  Rales. Heart:RR with some ectopy normal S1, S2, no murmurs, rubs, clicks or gallops , PMI non displaced. Edema: Edema is none. Lab Results   Component Value Date/Time    Cholesterol, total 205 (H) 06/17/2019 11:00 AM    HDL Cholesterol 50 06/17/2019 11:00 AM    LDL, calculated 107 (H) 06/17/2019 11:00 AM    Triglyceride 241 (H) 06/17/2019 11:00 AM     Lab Results   Component Value Date/Time    Sodium 138 11/25/2019 10:36 PM    Potassium 4.2 11/25/2019 10:36 PM    Chloride 105 11/25/2019 10:36 PM    CO2 28 11/25/2019 10:36 PM    Anion gap 5 11/25/2019 10:36 PM    Glucose 133 (H) 11/25/2019 10:36 PM    BUN 29 (H) 11/25/2019 10:36 PM    Creatinine 1.18 (H) 11/25/2019 10:36 PM    BUN/Creatinine ratio 25 (H) 11/25/2019 10:36 PM    GFR est AA 55 (L) 11/25/2019 10:36 PM    GFR est non-AA 45 (L) 11/25/2019 10:36 PM    Calcium 9.2 11/25/2019 10:36 PM      Wt Readings from Last 3 Encounters:   08/31/20 200 lb (90.7 kg)   11/25/19 219 lb 9.3 oz (99.6 kg)   06/17/19 228 lb (103.4 kg)      BP Readings from Last 3 Encounters:   08/31/20 138/74   01/17/20 136/74   01/03/20 130/67      Current Outpatient Medications   Medication Sig    Eliquis 5 mg tablet TAKE 1 TABLET BY MOUTH TWICE A DAY    ferrous sulfate (Slow Fe) 142 mg (45 mg iron) ER tablet Take  by mouth Daily (before breakfast).     albuterol (PROVENTIL HFA, VENTOLIN HFA, PROAIR HFA) 90 mcg/actuation inhaler Take 2 Puffs by inhalation every six (6) hours as needed for Wheezing.  metFORMIN (GLUCOPHAGE) 1,000 mg tablet Take 1 Tab by mouth two (2) times daily (with meals).  atorvastatin (LIPITOR) 40 mg tablet Take 1 Tab by mouth daily.  hydroCHLOROthiazide (HYDRODIURIL) 25 mg tablet Take 1 Tab by mouth daily.  multivitamin (ONE A DAY) tablet Take 1 Tab by mouth daily. With Extra Iron    dicyclomine (BENTYL) 10 mg capsule Take 10 mg by mouth two (2) times a day.  fluticasone-salmeterol (ADVAIR DISKUS) 100-50 mcg/dose diskus inhaler Take 1 Puff by inhalation every twelve (12) hours. No current facility-administered medications for this visit. Impression see above.       Written by Windy Day, as dictated by Carlo Elise MD.

## 2021-06-29 ENCOUNTER — TELEPHONE (OUTPATIENT)
Dept: CARDIOLOGY CLINIC | Age: 71
End: 2021-06-29

## 2021-06-29 NOTE — TELEPHONE ENCOUNTER
Received fax from [place] [phone] Specialist requesting patient hold her Eliquis 3 days prior to a sterid injection.

## 2021-11-18 DIAGNOSIS — I10 ESSENTIAL HYPERTENSION: ICD-10-CM

## 2021-11-18 RX ORDER — HYDROCHLOROTHIAZIDE 25 MG/1
25 TABLET ORAL DAILY
Qty: 90 TABLET | Refills: 0 | Status: SHIPPED | OUTPATIENT
Start: 2021-11-18 | End: 2022-03-04

## 2022-02-17 ENCOUNTER — OFFICE VISIT (OUTPATIENT)
Dept: ORTHOPEDIC SURGERY | Age: 72
End: 2022-02-17
Payer: COMMERCIAL

## 2022-02-17 VITALS — HEIGHT: 68 IN | BODY MASS INDEX: 31.83 KG/M2 | WEIGHT: 210 LBS

## 2022-02-17 DIAGNOSIS — G56.01 RIGHT CARPAL TUNNEL SYNDROME: Primary | ICD-10-CM

## 2022-02-17 PROCEDURE — 99203 OFFICE O/P NEW LOW 30 MIN: CPT | Performed by: ORTHOPAEDIC SURGERY

## 2022-02-17 NOTE — PROGRESS NOTES
Db Callahan (: 1950) is a 67 y.o. female patient here for evaluation of the following chief complaint(s):  Hand Pain (Right hand numbess, radial 3 fingers, for the past year, wakes her from sleep. States had COVID at the time. No symptoms on left.)       ASSESSMENT/PLAN:  Below is the assessment and plan developed based on review of pertinent history, physical exam, labs, studies, and medications. 1. Right carpal tunnel syndrome      72-year-old female with right carpal tunnel syndrome. We thoroughly reviewed treatment options including obtaining a nerve conduction study or EMG, steroid injection and bracing and NSAIDs. Given the severity with some thenar muscle loss and the patient's readiness to definitively treat the problem she wants to proceed with carpal tunnel release. We discussed risks, benefits and alternatives to surgery. After thorough discussion ultimately the patient elected to proceed with operative intervention. We discussed the risks including but not limited to postoperative pain, swelling, bruising, bleeding, scarring, infection, damage to neurovascular structures. We also discussed permanent or temporary loss of range of motion, need for additional surgery, rejection of foreign material such as metal, suture or other tissue. We discussed risk of blood clots. Plan is for right carpal tunnel release. Follow-up and Dispositions    · Return for post-op. Patient verbalized understanding and elected to proceed. All questions were answered to the patient's apparent satisfaction. SUBJECTIVE/OBJECTIVE:  HPI  72-year-old female with a 1 year history of right hand numbness and tingling in the thumb, index, middle finger as well as the radial half of the ring finger. She states it wakes her up from sleep at night. She states symptoms are getting worse and she is dropping things. She also states she is noticing some temperature intolerance.     Patient reports a sudden onset of symptoms. Duration of problem 1 year. Symptom Severity 5/10  Symptom Frequency intermittent        Allergies   Allergen Reactions    Garlic Hives    Penicillin G Hives       Current Outpatient Medications   Medication Sig    COLLAGEN by Does Not Apply route.  hydroCHLOROthiazide (HYDRODIURIL) 25 mg tablet Take 1 Tablet by mouth daily.  apixaban (Eliquis) 5 mg tablet Take 1 Tab by mouth two (2) times a day.  albuterol (PROVENTIL HFA, VENTOLIN HFA, PROAIR HFA) 90 mcg/actuation inhaler Take 2 Puffs by inhalation every six (6) hours as needed for Wheezing.  metFORMIN (GLUCOPHAGE) 1,000 mg tablet Take 1 Tab by mouth two (2) times daily (with meals).  dicyclomine (BENTYL) 10 mg capsule Take 10 mg by mouth two (2) times a day.  fluticasone-salmeterol (ADVAIR DISKUS) 100-50 mcg/dose diskus inhaler Take 1 Puff by inhalation every twelve (12) hours.  ferrous sulfate (Slow Fe) 142 mg (45 mg iron) ER tablet Take  by mouth Daily (before breakfast). (Patient not taking: Reported on 2/17/2022)    atorvastatin (LIPITOR) 40 mg tablet Take 1 Tab by mouth daily. (Patient not taking: Reported on 2/17/2022)    multivitamin (ONE A DAY) tablet Take 1 Tab by mouth daily. With Extra Iron (Patient not taking: Reported on 2/17/2022)     No current facility-administered medications for this visit.        Social History     Socioeconomic History    Marital status:      Spouse name: Not on file    Number of children: Not on file    Years of education: Not on file    Highest education level: Not on file   Occupational History    Not on file   Tobacco Use    Smoking status: Never Smoker    Smokeless tobacco: Never Used   Substance and Sexual Activity    Alcohol use: Yes     Comment: once per 6 months at social event    Drug use: No    Sexual activity: Not on file   Other Topics Concern     Service Not Asked    Blood Transfusions Not Asked    Caffeine Concern Not Asked    Occupational Exposure Not Asked   Arvsimon Meme Hazards Not Asked    Sleep Concern Not Asked    Stress Concern Not Asked    Weight Concern Not Asked    Special Diet Not Asked    Back Care Not Asked    Exercise Not Asked    Bike Helmet Not Asked   [address] Not Asked    Self-Exams Not Asked   Social History Narrative    Not on file     Social Determinants of Health     Financial Resource Strain:     Difficulty of Paying Living Expenses: Not on file   Food Insecurity:     Worried About Running Out of Food in the Last Year: Not on file    Marlon of Food in the Last Year: Not on file   Transportation Needs:     Lack of Transportation (Medical): Not on file    Lack of Transportation (Non-Medical): Not on file   Physical Activity:     Days of Exercise per Week: Not on file    Minutes of Exercise per Session: Not on file   Stress:     Feeling of Stress : Not on file   Social Connections:     Frequency of Communication with Friends and Family: Not on file    Frequency of Social Gatherings with Friends and Family: Not on file    Attends Rastafari Services: Not on file    Active Member of [address] [place] [address] or Organizations: Not on file    Attends Club or Organization Meetings: Not on file    Marital Status: Not on file   Intimate Partner Violence:     Fear of Current or Ex-Partner: Not on file    Emotionally Abused: Not on file    Physically Abused: Not on file    Sexually Abused: Not on file   Housing Stability:     Unable to Pay for Housing in the Last Year: Not on file    Number of [place] in the Last Year: Not on file    Unstable Housing in the Last Year: Not on file       Past Surgical History:   Procedure Laterality Date    HX APPENDECTOMY      HX CHOLECYSTECTOMY      HX HYSTERECTOMY      partial, has ovaries    HX ORTHOPAEDIC      tennis elbow       History reviewed. No pertinent family history.          Review of Systems  ROS     Positive for: Musculoskeletal    Last edited by Denyse Severance on 2/17/2022  2:27 PM. (History)        No flowsheet data found. Vitals:  Ht 5' 8\" (1.727 m)   Wt 210 lb (95.3 kg)   BMI 31.93 kg/m²    Estimated body surface area is 2.14 meters squared as calculated from the following:    Height as of this encounter: 5' 8\" (1.727 m). Weight as of this encounter: 210 lb (95.3 kg). Body mass index is 31.93 kg/m². Physical Exam    Musculoskeletal Exam:    Right NEURO EXAM:    Phalen's: Positive    MNCT: Positive    Thenar Atrophy: mild    Tinel's MN: Positive    APB STRENGTH: 4/5    1st DI Strength: 5/5      Elbow Flexion Test: Negative      Patient fires AIN, PIN and ulnar nerves. Sensation is grossly intact in the median, radial and ulnar distribution. Hand is pink and appears well-perfused. Hand is warm. Skin is intact. Consitutional: Healthy  Skin:   - Edema - no  - Cellulitis - No    Neuro: Numbness or tingling in R/L arm: yes    Psych: Affect normal    Cardiovascular: Capillary Refill < 2 seconds in upper extremities    Respiratory: Non-Labored Breathing    ROS:    Constitutional: Denies fever/chills    Respiratory: Denies SOB          An electronic signature was used to authenticate this note.   -- Elton Mcghee MD

## 2022-02-17 NOTE — PATIENT INSTRUCTIONS
From the [address] [id number] for Surgery of the Hand    Carpal Tunnel Syndrome    What is Carpal Tunnel Syndrome? Carpal tunnel syndrome is a condition affecting one of the main nerves in the wrist area. The carpal tunnel is a space created by the natural arch of the wrist bones. A thick band called the transverse carpal ligament creates a roof to the tunnel. This means that the size of the tunnel cannot change, as the bones and ligament act like solid walls. Nine tendons that bend the fingers and thumb and the median nerve pass through the tunnel. The median nerve provides feeling (sensation) to the skin of the thumb, index and middle fingers, as well as half the ring finger. The nerve also provides the communication line to the muscles at the base of the thumb (thenar muscles). Figure 1        The median nerve and nine tendons pass from the forearm into the hand, where carpal tunnel syndrome happens. Figure 2        The ligament that would be cut during surgery for carpal tunnel syndrome      Causes      The most likely cause of carpal tunnel syndrome is extra pressure on the median nerve at the wrist inside the tunnel. This extra pressure can come from swelling (inflammation) of the contents inside the tunnel. When pressure results in nerve symptoms, it is called a compressive neuropathy. While the exact carpal tunnel syndrome causes are usually unknown and due to the patients personal anatomy, there are many factors that can contribute to the increased pressure or inflammation, including:    Rheumatoid arthritis  Gout  Amyloidosis  Infections  Psoriatic arthritis  Arthritic spurs of the carpal bones  Tumors  Ganglion cysts  Wrist fracture or dislocation of the wrist  Repetitive motions performed at work or home    Even making a tight closed fist or holding the wrist in bent or extended positions can put increased pressure on the median nerve.  A prolonged or constant fist or bend (like a fist during sleep, reading a book, or some other activities) may put enough pressure to cause the numbness/tingling. If the funny feeling in the fingers just began, this is easily resolved by moving the fingers back and forth and out of the position. If the pressure continues off and on for weeks to months, the symptoms may come on faster after the activity or wrist position is created. It may also take longer for the symptoms to go away after the activity stops. Eventually, symptoms can become constant. Repetitive activities in the workplace with forceful or repetitive gripping or vibration can also increase symptoms. However, it is complicated to determine if the work activity is the main cause of the symptoms or if work is incidentally just aggravating a condition that is already present (unrelated to work). The determination of cause of symptoms requires experienced and specialized health care providers to provide an opinion, taking many factors into account. There are some risk factors that increase the chances of getting carpal tunnel syndrome. For example, women are more likely than men to experience carpal tunnel syndrome. It is more likely to occur with aging. Each decade someone is alive, there are more people that experience carpal tunnel syndrome. Thus, it is rare in children and adolescents and more common in ages 36 and over. Carpal tunnel syndrome is more common in people with obesity, diabetes, alcohol addiction, fibromyalgia and hypothyroidism. If you have carpal tunnel syndrome, your children may be more likely to get it. Also, during pregnancy, hormonal changes and extra body fluid retention may add swelling and pressure into the tunnel. While symptoms can sometimes be worse at night, sleep has not been shown to cause carpal tunnel. Some people get symptoms while driving a car. This is also not necessarily the primary cause of the problem.  The wrist position during driving or sleep, for example, may simply aggravate the symptoms. Signs and Symptoms    Some of the symptoms of carpal tunnel syndrome may include:    Numbness and tingling that is often worse at night  Waking up at night, having to shake hand or hold over the side of the bed  Fingers feeling swollen or fuzzy  Dropping objects  Weak pinch  Discomfort in wrist, hand or fingers    The main symptom of carpal tunnel syndrome is numbness and/or tingling in the thumb, index and middle fingers, and all or half of the ring finger (the side closest to the thumb). In most cases of carpal tunnel syndrome, the numbness/tingling comes on gradually. If mild, the symptoms may come and go for months or even years without worsening. The symptoms can come and go during the day or at night. The symptoms may vary based on time of day, activity or wrist position. Sometimes the fingers are numb, and other times there is normal feeling. If the condition becomes worse, the numbness may become constant. The speed of symptom worsening can vary from very gradual where it is hard to notice, or the symptoms may come on all of a sudden. In the most severe cases, the muscles at the base of the thumb become weak and shrink in size. Dropping things may happen from both the numbness and/or thumb weakness. In some types of injuries where there is deformity from a fracture or dislocation or significant internal bleeding, carpal tunnel syndrome may come on rapidly. Because the pressure rises rapidly, the nerve does not have time to adjust. This often creates more severe symptoms with pain as a noticeable symptom, and treatment is more of an emergency. It the sudden and severe nerve pressure problem is linked to an injury, the recovery is more unpredictable and slower. Discomfort or pain can also happen with gradual onset carpal tunnel syndrome. However, pain is typically not the first or main symptom.  The pain from carpal tunnel syndrome usually only occurs after the numbness and tingling has started. The pain resolves when the numbness and tingling is alleviated. Shaking the hand or bending/straightening the fingers repeatedly may alleviate the numbness and tingling. The pain of carpal tunnel syndrome may be only in the hand or may be radiated to the forearm, even to the shoulder. It is important to understand that pain without median nerve finger numbness or tingling is NOT usually carpal tunnel syndrome. Treatment    Diagnosis of Carpal Tunnel Syndrome    The diagnosis of carpal tunnel syndrome is made in many patients based on their history of symptoms and a physical examination. It is important to know which fingers experience the numbness or tingling and which fingers do not. When performing the physical exam, your hand surgeon will perform sensation testing on the palm side and the back side of the fingers and hand. He or she may also perform sensory testing of the forearm and arm because finding numbness outside the median nerve area may suggest a different problem. The doctor may also perform some tests including the Phalens maneuver, the Tinels test, and a compression test. These tests are designed to increase pressure on the median nerve to cause your symptoms to appear. Electrodiagnostic studies (EMG) may also be used in the diagnosis. These provide evidence of nerve function or dysfunction. Also, they can help to find other causes of numbness which may have similar symptoms, such as diabetic neuropathy or cervical radiculopathy. Magnetic Resonance Imaging (MRI) and ultrasound studies are anatomic imaging tests that visualize the size of the median nerve and may also be used. Anatomic imaging tests are often helpful when other conditions are suspected like a ligament tear or tendinitis that may contribute to pain. Therefore, it is possible that your doctor will diagnose you with a condition other than carpal tunnel syndrome.  Other conditions that can cause numbness/tingling in the hand include compression conditions in the forearm, elbow, shoulder, or neck, fibromyalgia, myofascial pain syndrome and peripheral neuropathy. Non-Surgical Treatment    The main goal of treatment is to reduce or remove the causes of increased nerve pressure. This should result in a decrease in symptoms. Some non-surgical treatment options may include:    Oral anti-inflammatory medicine    Steroid injection (cortisone shot)    Wrist splint(s)    Oral medications and injections are more effective when symptoms are present for a short period of time, infrequent and mild. Wrist splinting, mainly at night, keeps the wrist out of a bent position. Wrist splints are most helpful with symptoms that are affected by the hand or wrist position. Splints are also more helpful when the symptoms are mild and when symptoms have been present for a shorter period of time. However, splints have been shown to improve, but not cure symptoms, even when carpal tunnel is severe. It can also be useful to limit activities that bring on numbness and tingling. Surgical Treatment    Surgical release of the carpal tunnel ligament is one of the most effective treatments. It takes the extra pressure off the nerve immediately and reliably. There are several different surgical techniques to cut the transverse carpal ligament. By opening the ligament, there is more room for the tendons and the nerve to pass through the tunnel without pressure. After carpal tunnel release surgery, your surgeon may recommend temporarily avoiding certain activities. Moving the fingers right away and frequently after a surgical procedure helps to limit stiffness, swelling, and adhesions. Adhesions are areas of scar tissue that can form and link the nerve to the tendons it rests on. If adhesions form, the moving tendon will pull on the nerve and may cause symptoms.  Early, gentle finger and wrist motion can help avoid adhesions and encourage tendons and nerves to move separately. The surgical scar and surrounding area may go through some mild changes in color, firmness and tenderness over the first several months. These changes are normal. Most scars wont change much after four months. You may work with a hand therapist, who will give instructions on exercises and scar massage to get your hand function back to normal. Returning to work after carpal tunnel surgery is dependent upon each persons symptoms, job demands and employer policies. In patients with less physically demanding jobs, they will be able to return to work in a few days, where other jobs may take weeks to safely return. Your surgeon and therapist will discuss the best recovery plan with you. After surgery, most patients have very little pain at the incision site. Numerous scientific studies have shown surgical pain can be well-controlled with acetaminophen, non-steroidal anti-inflammatory drugs (NSAIDs) (like Ibuprofen), ice, elevation, early gentle finger and wrist motion, and limiting activity to things that are comfortable. Very rarely a patient may require a few doses of a stronger opioid pain medication within the first 2-3 days. It is very important to limit this type of strong pain medication to decrease the chance of becoming dependent on the medicine, having a tolerance build up, or having withdrawal symptoms when trying to stop it. Multiple medical organizations and state licensing boards have established guidelines and policies on safe prescribing of pain medication. Please discuss any concerns with your surgeon prior to any surgery to make sure everyone is on the same page and expectations can be clearly set and understood. Outcomes After Surgery    Better results will occur when carpal tunnel is recognized and treated earlier. This means seeking treatment when your symptoms are not severe, and they come and go.  There will likely be complete return of feeling and muscle use after surgery. Relief may even be immediate. However, if the symptoms are severe and constant when you seek treatment, the final result may be unknown. Your healing may be slow (six or more months). If there was permanent damage before surgery, you will likely still feel numbness. Unfortunately, there is no test to tell if the symptoms are reversible. If you lost use of your thumb muscle, that will likely persist, at least to some degree.

## 2022-02-18 DIAGNOSIS — G56.01 RIGHT CARPAL TUNNEL SYNDROME: Primary | ICD-10-CM

## 2022-02-28 ENCOUNTER — OFFICE VISIT (OUTPATIENT)
Dept: CARDIOLOGY CLINIC | Age: 72
End: 2022-02-28
Payer: COMMERCIAL

## 2022-02-28 ENCOUNTER — TELEPHONE (OUTPATIENT)
Dept: CARDIOLOGY CLINIC | Age: 72
End: 2022-02-28

## 2022-02-28 VITALS
BODY MASS INDEX: 32.58 KG/M2 | RESPIRATION RATE: 16 BRPM | SYSTOLIC BLOOD PRESSURE: 120 MMHG | HEART RATE: 78 BPM | WEIGHT: 215 LBS | DIASTOLIC BLOOD PRESSURE: 70 MMHG | HEIGHT: 68 IN | OXYGEN SATURATION: 96 %

## 2022-02-28 DIAGNOSIS — I10 ESSENTIAL HYPERTENSION: ICD-10-CM

## 2022-02-28 DIAGNOSIS — I48.92 PAROXYSMAL ATRIAL FLUTTER (HCC): ICD-10-CM

## 2022-02-28 DIAGNOSIS — E87.6 HYPOKALEMIA: ICD-10-CM

## 2022-02-28 DIAGNOSIS — I48.0 PAF (PAROXYSMAL ATRIAL FIBRILLATION) (HCC): Primary | ICD-10-CM

## 2022-02-28 DIAGNOSIS — Z01.810 PRE-OPERATIVE CARDIOVASCULAR EXAMINATION: ICD-10-CM

## 2022-02-28 PROCEDURE — 99214 OFFICE O/P EST MOD 30 MIN: CPT | Performed by: SPECIALIST

## 2022-02-28 RX ORDER — ESCITALOPRAM OXALATE 10 MG/1
TABLET ORAL
COMMUNITY
Start: 2022-02-19

## 2022-02-28 RX ORDER — ROSUVASTATIN CALCIUM 40 MG/1
40 TABLET, COATED ORAL DAILY
COMMUNITY
Start: 2021-12-09

## 2022-02-28 RX ORDER — LISINOPRIL 10 MG/1
10 TABLET ORAL DAILY
COMMUNITY
Start: 2022-01-14

## 2022-02-28 NOTE — PROGRESS NOTES
Gilford Collet     1950       Marcelino Cueva MD, [facility]  Date of Visit-2/28/2022   PCP is [facility], [address] [facility] [place]  [facility]  HPI:  Gilford Collet is a 67 y.o. female   Last visit 8/31/2020. Pt was in [employer] [place] when seen in February of 2019 on [facility]. She had a nuclear stress test scheduled. This was completed in March 2019 and was normal.   She has been on Eliquis, HCTZ, and Atorvastatin. · Atrial fibrillation & atrial flutter with RVR ; Treated with diltiazem and put on OAC Eliquis  · HTN and lisinopril and HCTZ started; Before hospital stay not on  Cardiac or HTN meds other than HCTZ  · Avoid beta blocker due to lung disease  · Echo 11-14-18- EF 55-60% and mild LAE, Hospital stay November 2018; worse shortness of breath,CTA chest with no PE    Today. .. Pt states that she has had an issue with her nerves in her right hand after her COVID infection. She feels tachycardia prior to taking her asthma medicine, and notes some arrhythmia but feels completely fine. She states that episode was in her way to work but was not necessarily bothersome. Her twin sister was recently dx'd with Atrial fibrillation. Her hand surgery is on Wednesday. Seen at [facility] by Dr. Peterson Barnes on 2/28/2022 blood pressure 130/68 pulse 72 blood work ordered refills on lisinopril also on losartan advised to see us for A. fib. Patient has an EKG from 2/28/2022 which shows SR with PACs but hard to tell if Atrial fibrillation  at a rate of 65 with a QTC of 436 QRS of 104. Patient reports some palpitations and shortness of breath. Patient has [facility] Dr. Peterson Barnes [facility] with Dr. Reyes Quiet with orthopedics right carpal tunnel syndrome. Denies chest pain, edema, syncope or shortness of breath at rest    Assessment/Plan:     Patient Instructions   We will see you back in 6 months.       1. PAF (paroxysmal atrial fibrillation) (HCC) and flutter  She is relatively asymptomatic. Her EKG earlier at 843 this morning shows possible atrial fibrillation. Generally feels well with occasional persistent episodes. Taking [address] regularly. Eliquis 5 mg twice daily  YARED-VASC score of 3  Has not been on a specific or rhythm control medications with concern about beta-blockers. She was on diltiazem previously  If she becomes more symptomatic then we can consider antiarrhythmic drugs. At this time her main concern is to get to orthopedic surgery  No reason she cannot proceed to surgery, low risk. - AMB POC EKG ROUTINE W/ 12 LEADS, INTER & REP    2. Pre op CV risk  I have no objection to the planned  surgery. Patient seems to be at a low risk for salma-operative severe adverse cardiac events. She can hold Eliquis for 2 days prior to surgery and resume 1 day after    3. Essential hypertension  Stable control. Had COVID twice, but no attributable cardiac or pulmonary sx's. At goal , meds and possible side effects reviewed and patient denies  Key CAD CHF Meds             rosuvastatin (CRESTOR) 40 mg tablet (Taking) 40 mg daily. lisinopriL (PRINIVIL, ZESTRIL) 10 mg tablet (Taking) Take 10 mg by mouth daily. hydroCHLOROthiazide (HYDRODIURIL) 25 mg tablet (Taking) Take 1 Tablet by mouth daily. apixaban (Eliquis) 5 mg tablet (Taking) Take 1 Tab by mouth two (2) times a day. atorvastatin (LIPITOR) 40 mg tablet (Taking) Take 1 Tab by mouth daily. BP Readings from Last 6 Encounters:   02/28/22 120/70   08/31/20 138/74   01/17/20 136/74   01/03/20 130/67   12/20/19 127/71   12/13/19 145/77       4. Hypokalemia  F/u in 6 months     Impression:   1. PAF (paroxysmal atrial fibrillation) ([facility])    2. Paroxysmal atrial flutter ([address]    3. Pre-operative cardiovascular examination    4. Essential hypertension    5. Hypokalemia       Cardiac History:   No specialty comments available.      Future Appointments   Date Time Provider Jovana Granado   3/14/2022  8:15 AM Felipa Law MD [facility]   8/29/2022 11:00 AM Nola Lesches, MD [facility]        Patient Care Team:  Patient Tony Elliott as PCP - General  Nola Lesches, MD (Cardiology)  Arelis Nair MD as Physician (Orthopedic Surgery)    ROS-except as noted above. . A complete cardiac and respiratory are reviewed and negative except as above ; Resp-denies wheezing  or productive cough,. Const- No unusual weight loss or fever; Neuro-no recent seizure or CVA ; GI- No BRBPR, abdom pain, bloating ; - no  hematuria   see supplement sheet, initialed and to be scanned by staff  Past Medical History:   Diagnosis Date    Asthma     Diabetes ([facility] [address]     Essential hypertension 3/6/2019    Gastrointestinal disorder     diverticulitis    Hypokalemia 8/4/2016    Ovarian cyst     PAF (paroxysmal atrial fibrillation) ([facility] [address] 3/6/2019    [facility]oxys[facility] fl[facility] ([facility] 3/6/2019      Social Hx= reports that she has never smoked. She has never used smokeless tobacco. She reports current alcohol use. She reports that she does not use drugs. Exam and Labs:  /70   Pulse 78   Resp 16   Ht 5' 8\" (1.727 m)   Wt 215 lb (97.5 kg)   SpO2 96%   BMI 32.69 kg/m² Constitutional:  NAD, comfortable  Head: NC,AT. Eyes: No scleral icterus. Neck:  Neck supple. No JVD present. Throat: moist mucous membranes. Chest: Effort normal & normal respiratory excursion . Neurological: alert, conversant and oriented . Skin: Skin is not cold. No obvious systemic rash noted. Not diaphoretic. No erythema. Psychiatric:  Grossly normal mood and affect. Behavior appears normal. Extremities:  no clubbing or cyanosis. Abdomen: non distended    Lungs:breath sounds normal. No stridor. distress, wheezes or  Rales. Heart: normal rate, regular rhythm, normal S1, S2, no murmurs, rubs, clicks or gallops , PMI non displaced. Edema: Edema is none.   Lab Results   Component Value Date/Time    Cholesterol, total 205 (H) 06/17/2019 11:00 AM    HDL Cholesterol 50 06/17/2019 11:00 AM    LDL, calculated 107 (H) 06/17/2019 11:00 AM    Triglyceride 241 (H) 06/17/2019 11:00 AM     Lab Results   Component Value Date/Time    Sodium 138 11/25/2019 10:36 PM    Potassium 4.2 11/25/2019 10:36 PM    Chloride 105 11/25/2019 10:36 PM    CO2 28 11/25/2019 10:36 PM    Anion gap 5 11/25/2019 10:36 PM    Glucose 133 (H) 11/25/2019 10:36 PM    BUN 29 (H) 11/25/2019 10:36 PM    Creatinine 1.18 (H) 11/25/2019 10:36 PM    BUN/Creatinine ratio 25 (H) 11/25/2019 10:36 PM    GFR est AA 55 (L) 11/25/2019 10:36 PM    GFR est non-AA 45 (L) 11/25/2019 10:36 PM    Calcium 9.2 11/25/2019 10:36 PM      Wt Readings from Last 3 Encounters:   02/28/22 215 lb (97.5 kg)   02/17/22 210 lb (95.3 kg)   08/31/20 200 lb (90.7 kg)      BP Readings from Last 3 Encounters:   02/28/22 120/70   08/31/20 138/74   01/17/20 136/74      Current Outpatient Medications   Medication Sig    rosuvastatin (CRESTOR) 40 mg tablet 40 mg daily.  lisinopriL (PRINIVIL, ZESTRIL) 10 mg tablet Take 10 mg by mouth daily.  escitalopram oxalate (LEXAPRO) 10 mg tablet     COLLAGEN by Does Not Apply route.  hydroCHLOROthiazide (HYDRODIURIL) 25 mg tablet Take 1 Tablet by mouth daily.  apixaban (Eliquis) 5 mg tablet Take 1 Tab by mouth two (2) times a day.  albuterol (PROVENTIL HFA, VENTOLIN HFA, PROAIR HFA) 90 mcg/actuation inhaler Take 2 Puffs by inhalation every six (6) hours as needed for Wheezing.  metFORMIN (GLUCOPHAGE) 1,000 mg tablet Take 1 Tab by mouth two (2) times daily (with meals).  atorvastatin (LIPITOR) 40 mg tablet Take 1 Tab by mouth daily.  multivitamin (ONE A DAY) tablet Take 1 Tablet by mouth daily. With Extra Iron    dicyclomine (BENTYL) 10 mg capsule Take 10 mg by mouth two (2) times a day.  fluticasone-salmeterol (ADVAIR DISKUS) 100-50 mcg/dose diskus inhaler Take 1 Puff by inhalation every twelve (12) hours. No current facility-administered medications for this visit.       Impression see above.      Written by Shruti Santos, as dictated by Cinthia Aguayo MD.

## 2022-03-01 DIAGNOSIS — G56.01 RIGHT CARPAL TUNNEL SYNDROME: Primary | ICD-10-CM

## 2022-03-01 RX ORDER — HYDROCODONE BITARTRATE AND ACETAMINOPHEN 5; 325 MG/1; MG/1
1 TABLET ORAL
Qty: 10 TABLET | Refills: 0 | Status: SHIPPED | OUTPATIENT
Start: 2022-03-01 | End: 2022-03-06

## 2022-03-03 DIAGNOSIS — I10 ESSENTIAL HYPERTENSION: ICD-10-CM

## 2022-03-04 RX ORDER — HYDROCHLOROTHIAZIDE 25 MG/1
25 TABLET ORAL DAILY
Qty: 90 TABLET | Refills: 3 | Status: SHIPPED | OUTPATIENT
Start: 2022-03-04

## 2022-03-04 NOTE — TELEPHONE ENCOUNTER
Per VO by MD.    Future Appointments   Date Time Provider [facility]   3/14/2022  8:15 AM MD BEE Mcfarlane BS AMB   8/29/2022 11:00 AM MD YAMIL Smith AMB

## 2022-03-14 ENCOUNTER — OFFICE VISIT (OUTPATIENT)
Dept: ORTHOPEDIC SURGERY | Age: 72
End: 2022-03-14
Payer: COMMERCIAL

## 2022-03-14 VITALS — BODY MASS INDEX: 31.37 KG/M2 | WEIGHT: 207 LBS | HEIGHT: 68 IN

## 2022-03-14 DIAGNOSIS — G56.01 RIGHT CARPAL TUNNEL SYNDROME: Primary | ICD-10-CM

## 2022-03-14 PROCEDURE — 99024 POSTOP FOLLOW-UP VISIT: CPT | Performed by: ORTHOPAEDIC SURGERY

## 2022-03-14 NOTE — PROGRESS NOTES
Mega Riggs (: 1950) is a 67 y.o. female patient here for evaluation of the following chief complaint(s):  Wrist Pain (Right Wrist , thumb, 2nd and middle finger feel numb , ther is some pain in the thumb )       ASSESSMENT/PLAN:  Below is the assessment and plan developed based on review of pertinent history, physical exam, labs, studies, and medications. 1. Right carpal tunnel syndrome      Patient is doing very well at this time. I counseled her regarding her symptoms and they should continue to slowly improve and she is doing great so far. We discussed possibility of incomplete symptom resolution. Follow-up in 2 months for reevaluation. Patient verbalized understanding and elected to proceed. All questions were answered to the patient's apparent satisfaction. SUBJECTIVE/OBJECTIVE:    Patient is here today for a postoperative visit. Date of Surgery: 3/2/2022    Patient is doing very well today and states that her night symptoms have resolved. Overall symptoms are improving she is able to move her wrist better. She still does have numbness in the median nerve distribution but overall symptoms in general are improving significantly. Allergies   Allergen Reactions    Garlic Hives    Penicillin G Hives       Current Outpatient Medications   Medication Sig    hydroCHLOROthiazide (HYDRODIURIL) 25 mg tablet Take 1 Tablet by mouth daily.  rosuvastatin (CRESTOR) 40 mg tablet 40 mg daily.  lisinopriL (PRINIVIL, ZESTRIL) 10 mg tablet Take 10 mg by mouth daily.  escitalopram oxalate (LEXAPRO) 10 mg tablet     COLLAGEN by Does Not Apply route.  apixaban (Eliquis) 5 mg tablet Take 1 Tab by mouth two (2) times a day.  albuterol (PROVENTIL HFA, VENTOLIN HFA, PROAIR HFA) 90 mcg/actuation inhaler Take 2 Puffs by inhalation every six (6) hours as needed for Wheezing.  metFORMIN (GLUCOPHAGE) 1,000 mg tablet Take 1 Tab by mouth two (2) times daily (with meals).  atorvastatin (LIPITOR) 40 mg tablet Take 1 Tab by mouth daily.  multivitamin (ONE A DAY) tablet Take 1 Tablet by mouth daily. With Extra Iron    dicyclomine (BENTYL) 10 mg capsule Take 10 mg by mouth two (2) times a day.  fluticasone-salmeterol (ADVAIR DISKUS) 100-50 mcg/dose diskus inhaler Take 1 Puff by inhalation every twelve (12) hours. (Patient not taking: Reported on 3/14/2022)     No current facility-administered medications for this visit. Social History     Socioeconomic History    Marital status:      Spouse name: Not on file    Number of children: Not on file    Years of education: Not on file    Highest education level: Not on file   Occupational History    Not on file   Tobacco Use    Smoking status: Never Smoker    Smokeless tobacco: Never Used   Substance and Sexual Activity    Alcohol use: Yes     Comment: once per 6 months at social event    Drug use: No    Sexual activity: Not on file   Other Topics Concern     Service Not Asked    Blood Transfusions Not Asked    Caffeine Concern Not Asked    Occupational Exposure Not Asked    Hobby Hazards Not Asked    Sleep Concern Not Asked    Stress Concern Not Asked    Weight Concern Not Asked    Special Diet Not Asked    Back Care Not Asked    Exercise Not Asked    Bike Helmet Not Asked   [address] [facility] Not Asked    Self-Exams Not Asked   Social History Narrative    Not on file     Social Determinants of Health     Financial Resource Strain:     Difficulty of Paying Living Expenses: Not on file   Food Insecurity:     Worried About Running Out of Food in the Last Year: Not on file    Marlon of Food in the Last Year: Not on file   Transportation Needs:     Lack of Transportation (Medical): Not on file    Lack of Transportation (Non-Medical):  Not on file   Physical Activity:     Days of Exercise per Week: Not on file    Minutes of Exercise per Session: Not on file   Stress:     Feeling of Stress : Not on file   Social Connections:     Frequency of Communication with Friends and Family: Not on file    Frequency of Social Gatherings with Friends and Family: Not on file    Attends Temple Services: Not on file    Active Member of Clubs or Organizations: Not on file    Attends Club or Organization Meetings: Not on file    Marital Status: Not on file   Intimate Partner Violence:     Fear of Current or Ex-Partner: Not on file    Emotionally Abused: Not on file    Physically Abused: Not on file    Sexually Abused: Not on file   Housing Stability:     Unable to Pay for Housing in the Last Year: Not on file    Number of [place] in the Last Year: Not on file    Unstable Housing in the Last Year: Not on file       Past Surgical History:   Procedure Laterality Date    HX APPENDECTOMY      HX CHOLECYSTECTOMY      HX HYSTERECTOMY      partial, has ovaries    HX ORTHOPAEDIC      tennis elbow       History reviewed. No pertinent family history. Review of Systems    No flowsheet data found. Vitals:  Ht 5' 8\" (1.727 m)   Wt 207 lb (93.9 kg)   BMI 31.47 kg/m²    Estimated body surface area is 2.12 meters squared as calculated from the following:    Height as of this encounter: 5' 8\" (1.727 m). Weight as of this encounter: 207 lb (93.9 kg). Body mass index is 31.47 kg/m². Physical Exam    Musculoskeletal Exam:    Right Upper Extremity Exam:    Evaluation of the patient's postoperative site shows that the incision is intact and healing appropriately. There are no signs of infection, no redness, no warmth, no erythema. There is no purulent drainage noted. Patient fires AIN, PIN and ulnar nerves. Sensation is grossly intact in the median, radial and ulnar distribution. Hand is pink and appears well-perfused. Hand is warm.        Consitutional: Healthy  Skin:   - Edema - none  - Cellulitis - No    Neuro: Numbness or tingling in R/L arm: mild    Psych: Affect normal    Cardiovascular: Capillary Refill < 2 seconds in upper extremities    Respiratory: Non-Labored Breathing      ROS:    Constitutional: Denies fever/chills    Respiratory: Denies SOB        Imaging:    XR Results (most recent):  Results from Hospital Encounter encounter on 11/25/19    XR TIB/FIB LT    Narrative  EXAM:  CR tibia fibula 2 views left    INDICATION: Left lower leg pain. Wound without healing. COMPARISON: None. TECHNIQUE: 2 views left tibia/fibula    FINDINGS: There is no acute fracture or bony erosion. Degenerative changes are  noted in the knee. The soft tissues are unremarkable without soft tissue gas. Impression  IMPRESSION: No acute abnormality. No orders of the defined types were placed in this encounter. Procedures:    Sutures were removed without difficulty today. Band-Aid was applied. An electronic signature was used to authenticate this note.   -- Khai Valdez MD

## 2022-03-18 PROBLEM — E66.01 SEVERE OBESITY (HCC): Status: ACTIVE | Noted: 2019-02-25

## 2022-03-18 PROBLEM — L97.922 CHRONIC ULCER OF LEFT LEG WITH FAT LAYER EXPOSED (HCC): Status: ACTIVE | Noted: 2019-12-13

## 2022-03-18 PROBLEM — I10 ESSENTIAL HYPERTENSION: Status: ACTIVE | Noted: 2019-03-06

## 2022-03-19 PROBLEM — L03.116 CELLULITIS OF LEFT LOWER EXTREMITY: Status: ACTIVE | Noted: 2017-08-03

## 2022-03-19 PROBLEM — R06.02 SOB (SHORTNESS OF BREATH): Status: ACTIVE | Noted: 2018-11-14

## 2022-03-19 PROBLEM — I48.0 PAF (PAROXYSMAL ATRIAL FIBRILLATION) (HCC): Status: ACTIVE | Noted: 2019-03-06
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
## 2022-03-19 PROBLEM — I48.92 PAROXYSMAL ATRIAL FLUTTER (HCC): Status: ACTIVE | Noted: 2019-03-06

## 2022-03-19 PROBLEM — S81.852S: Status: ACTIVE | Noted: 2019-12-13

## 2022-03-19 NOTE — TELEPHONE ENCOUNTER
Patient seen in the office on 2/28/2022 and had an EKG that suggested [facility] rhythm at [facility] that morning. After reviewing her chart further I think I would like to go ahead and get a 48-hour Holter to see if she is in A. fib or sinus since this EKG is tough to tell. Please arrange a 48-hour Holter and will call her with results  This does not change any of her status in for any needed orthopedic surgery  I have no objection to the planned  surgery. Patient seems to be at a low risk for salma-operative severe adverse cardiac events.

## 2022-03-20 PROBLEM — R07.2 SUBSTERNAL PRECORDIAL CHEST PAIN: Status: ACTIVE | Noted: 2019-03-06

## 2022-03-21 NOTE — TELEPHONE ENCOUNTER
Verified patient with two types of identifiers. Patient is happy to come to have a monitor placed per Dr. Germán Hair. Patient verbalized understanding and will call with any other questions.    Future Appointments   Date Time Provider Jovana Granado   3/23/2022  2:00 PM HOLTER, REYNOLDS [facility]   8/29/2022 11:00 AM Marcelino Rodriguez MD [facility]

## 2022-03-23 ENCOUNTER — CLINICAL SUPPORT (OUTPATIENT)
Dept: CARDIOLOGY CLINIC | Age: 72
End: 2022-03-23
Payer: COMMERCIAL

## 2022-03-23 DIAGNOSIS — I48.0 PAF (PAROXYSMAL ATRIAL FIBRILLATION) (HCC): Primary | ICD-10-CM

## 2022-03-23 PROCEDURE — 93227 XTRNL ECG REC<48 HR R&I: CPT | Performed by: SPECIALIST

## 2022-03-23 PROCEDURE — 93225 XTRNL ECG REC<48 HRS REC: CPT | Performed by: SPECIALIST

## 2022-04-05 ENCOUNTER — TELEPHONE (OUTPATIENT)
Dept: CARDIOLOGY CLINIC | Age: 72
End: 2022-04-05

## 2022-04-06 NOTE — TELEPHONE ENCOUNTER
Amanda Asif; Trudie Lefort 20 hours ago (2:09 PM)     SE    It's in Sab's box to sign - it was a holter. I sent him a message it was in there too. Message text      Tara Salinas; Trudie Lefort 21 hours ago (1:52 PM)     ES    Is his monitor done yet? Routing comment      LM for RC. Need to advise that his monitor has not been read by Dr. Susie Da Silva yet, however if he is having symptoms I can sent to another provider to read.

## 2022-04-07 NOTE — TELEPHONE ENCOUNTER
RC from pt, ID verified. Advised her monitor was in Dr. Jin maharaj for review and he is out of office this week. Advised I could forward to another provider to review, she states she is feeling well having no problems and would prefer Dr. Casandra Forbes review it when he is back in. Advised if she had any problems to let us now, otherwise we would call her about results.

## 2022-04-15 NOTE — TELEPHONE ENCOUNTER
Holter /Loop monitor  Duration/Dates: 48-hour Holter March 23 to March 25, 2022  Average heart rate: 65 bpm  Findings: Sinus rhythm sinus bradycardia with bradycardia 26%. No atrial fibrillation no significant number of PVCs or P SVC's. The total PS VCs were 3.88% total PVCs 0.19%. There were 3 occurrences of SVT longest 9 beats. Overall test was done to see if she had recurrent atrial fibrillation and she does not seem to. This is good news and we will continue on current medications and therapy  Nurse to call pt for test result  The patient is to be reassured that these symptoms do not appear to represent a serious or threatening condition. Future Appointments   Date Time Provider Jovana Muelleri   4/21/2022  3:45 PM MD [facility]   8/29/2022 11:00 AM Marcelino Rodriguez MD [facility]      Current Outpatient Medications   Medication Instructions    albuterol (PROVENTIL HFA, VENTOLIN HFA, PROAIR HFA) 90 mcg/actuation inhaler 2 Puffs, Inhalation, EVERY 6 HOURS AS NEEDED    apixaban (ELIQUIS) 5 mg, Oral, 2 TIMES DAILY    atorvastatin (LIPITOR) 40 mg, Oral, DAILY    COLLAGEN Does Not Apply    dicyclomine (BENTYL) 10 mg, Oral, 2 TIMES DAILY    escitalopram oxalate (LEXAPRO) 10 mg tablet No dose, route, or frequency recorded.     fluticasone-salmeterol (ADVAIR DISKUS) 100-50 mcg/dose diskus inhaler 1 Puff, EVERY 12 HOURS    hydroCHLOROthiazide (HYDRODIURIL) 25 mg, Oral, DAILY    lisinopriL (PRINIVIL, ZESTRIL) 10 mg, Oral, DAILY    metFORMIN (GLUCOPHAGE) 1,000 mg, Oral, 2 TIMES DAILY WITH MEALS    multivitamin (ONE A DAY) tablet 1 Tablet, Oral, DAILY, With Extra Iron    rosuvastatin (CRESTOR) 40 mg, DAILY

## 2023-03-17 DIAGNOSIS — I10 ESSENTIAL HYPERTENSION: ICD-10-CM

## 2023-03-20 RX ORDER — HYDROCHLOROTHIAZIDE 25 MG/1
TABLET ORAL
Qty: 90 TABLET | Refills: 3 | Status: SHIPPED | OUTPATIENT
Start: 2023-03-20

## 2023-04-29 RX ORDER — HYDROCHLOROTHIAZIDE 25 MG/1
TABLET ORAL DAILY
COMMUNITY
Start: 2022-03-04

## 2023-04-29 RX ORDER — LISINOPRIL 10 MG/1
TABLET ORAL DAILY
COMMUNITY
Start: 2022-01-14

## 2023-04-29 RX ORDER — ESCITALOPRAM OXALATE 10 MG/1
TABLET ORAL
COMMUNITY
Start: 2022-02-19

## 2023-04-29 RX ORDER — DICYCLOMINE HYDROCHLORIDE 10 MG/1
CAPSULE ORAL 2 TIMES DAILY
COMMUNITY

## 2023-04-29 RX ORDER — ATORVASTATIN CALCIUM 40 MG/1
TABLET, FILM COATED ORAL DAILY
COMMUNITY
Start: 2019-06-19

## 2023-04-29 RX ORDER — FLUTICASONE PROPIONATE AND SALMETEROL 100; 50 UG/1; UG/1
1 POWDER RESPIRATORY (INHALATION) EVERY 12 HOURS
COMMUNITY

## 2023-04-29 RX ORDER — ALBUTEROL SULFATE 90 UG/1
2 AEROSOL, METERED RESPIRATORY (INHALATION) EVERY 6 HOURS PRN
COMMUNITY
Start: 2020-07-13

## 2023-04-29 RX ORDER — ROSUVASTATIN CALCIUM 40 MG/1
TABLET, COATED ORAL DAILY
COMMUNITY
Start: 2021-12-09

## 2024-06-04 ENCOUNTER — OFFICE VISIT (OUTPATIENT)
Age: 74
End: 2024-06-04
Payer: COMMERCIAL

## 2024-06-04 VITALS
OXYGEN SATURATION: 95 % | SYSTOLIC BLOOD PRESSURE: 152 MMHG | HEIGHT: 68 IN | WEIGHT: 187 LBS | HEART RATE: 78 BPM | DIASTOLIC BLOOD PRESSURE: 76 MMHG | BODY MASS INDEX: 28.34 KG/M2

## 2024-06-04 DIAGNOSIS — I48.92 PAROXYSMAL ATRIAL FLUTTER (HCC): ICD-10-CM

## 2024-06-04 DIAGNOSIS — E78.00 HYPERCHOLESTEREMIA: ICD-10-CM

## 2024-06-04 DIAGNOSIS — I10 ESSENTIAL HYPERTENSION: ICD-10-CM

## 2024-06-04 DIAGNOSIS — R06.02 SOB (SHORTNESS OF BREATH): ICD-10-CM

## 2024-06-04 DIAGNOSIS — I48.0 PAF (PAROXYSMAL ATRIAL FIBRILLATION) (HCC): Primary | ICD-10-CM

## 2024-06-04 PROCEDURE — 3077F SYST BP >= 140 MM HG: CPT | Performed by: SPECIALIST

## 2024-06-04 PROCEDURE — 1123F ACP DISCUSS/DSCN MKR DOCD: CPT | Performed by: SPECIALIST

## 2024-06-04 PROCEDURE — 93000 ELECTROCARDIOGRAM COMPLETE: CPT | Performed by: SPECIALIST

## 2024-06-04 PROCEDURE — 99214 OFFICE O/P EST MOD 30 MIN: CPT | Performed by: SPECIALIST

## 2024-06-04 PROCEDURE — 3078F DIAST BP <80 MM HG: CPT | Performed by: SPECIALIST

## 2024-06-04 ASSESSMENT — PATIENT HEALTH QUESTIONNAIRE - PHQ9
SUM OF ALL RESPONSES TO PHQ QUESTIONS 1-9: 0
2. FEELING DOWN, DEPRESSED OR HOPELESS: NOT AT ALL
SUM OF ALL RESPONSES TO PHQ9 QUESTIONS 1 & 2: 0
SUM OF ALL RESPONSES TO PHQ QUESTIONS 1-9: 0
SUM OF ALL RESPONSES TO PHQ QUESTIONS 1-9: 0
1. LITTLE INTEREST OR PLEASURE IN DOING THINGS: NOT AT ALL
SUM OF ALL RESPONSES TO PHQ QUESTIONS 1-9: 0

## 2024-06-04 NOTE — PROGRESS NOTES
Inhibitors       lisinopril (PRINIVIL;ZESTRIL) 10 MG tablet Take by mouth daily       Thiazides and Thiazide-Like Diuretics       hydroCHLOROthiazide (HYDRODIURIL) 25 MG tablet Take by mouth daily           BP Readings from Last 6 Encounters:   06/04/24 (!) 152/76   02/28/22 120/70        - EKG 12 Lead    3. Hypercholesteremia  LDL  at goal , denies excess muscle aches or new liver issues  Lipid Lowering Medications       HMG CoA Reductase Inhibitors       atorvastatin (LIPITOR) 40 MG tablet Take by mouth daily     rosuvastatin (CRESTOR) 40 MG tablet daily           Lab Results   Component Value Date     (H) 06/17/2019         4. Paroxysmal atrial flutter (HCC)  null    5. SOB (shortness of breath)  Symptoms have improved.           Impression:   1. PAF (paroxysmal atrial fibrillation) (HCC)    2. Essential hypertension    3. Hypercholesteremia    4. Paroxysmal atrial flutter (HCC)    5. SOB (shortness of breath)        Cardiac History:   No specialty comments available.     Future Appointments   Date Time Provider Department Center   7/18/2024  9:00 AM [facility]   6/10/2025 10:20 AM Bogdan Chaudhary MD [facility]      Patient Care Team:  Lev Lazo MD as Physician   ROS-except as noted above.. A complete cardiac and respiratory are reviewed and negative except as above ; Resp-denies wheezing  or productive cough,. Const- No unusual weight loss or fever; Neuro-no recent seizure or CVA ; GI- No BRBPR, abdom pain, bloating ; - no  hematuria   see supplement sheet, initialed and to be scanned by staff    Past Medical History:   Diagnosis Date    Asthma     Diabetes (HCC)     Essential hypertension 3/6/2019    Gastrointestinal disorder     diverticulitis    Hypokalemia 8/4/2016    Ovarian cyst     PAF (paroxysmal atrial fibrillation) (HCC) 3/6/2019    Paroxysmal atrial flutter (HCC) 3/6/2019      Social Hx= reports that she has never smoked. She has never used smokeless tobacco.

## 2024-07-25 ENCOUNTER — TELEPHONE (OUTPATIENT)
Age: 74
End: 2024-07-25

## 2024-07-30 NOTE — TELEPHONE ENCOUNTER
Telephone call made to patient. Two patient identifiers verified.   Went over results with patient. Verified understanding. All questions answered.     Future Appointments   Date Time Provider Department Center   6/10/2025 10:20 AM Bogdan Chaudhary MD [facility]

## 2025-03-31 NOTE — H&P (VIEW-ONLY)
CARE TEAM:  Patient Care Team:  Pcp, No as PCP - General (General Practice)      HISTORY OF PRESENT ILLNESS  Chief Complaint: Pain of the Left Knee and Pain of the Right Knee   Age: 75 y.o.    Sex: female   Hand-dominance: Right    History of present illness: Ms. Martin presents today for evaluation of bilateral knee pain. Pt denies inciting event or history of trauma to the affected knee.  Pt reports knee pain described as constant, dull, and aching.  The patient locates the pain: global. The patient does not experience groin or hip pain.  The patient does not experience instability. The patient does experience mechanical symptoms about the knee.  The pain is affecting the patient's ADLs and ability to sleep through the night.  The patient's symptoms/pain have progressed despite attempts at conservative management including: RICE, activity modification, NSAIDs, Tylenol, low impact exercise, IASI.   The patient denies any recent fever, chills, night sweats or calf pain.     The past medical history, social history, medications and allergies were updated and reviewed during this visit.     OBJECTIVE  Constitutional:  No acute distress. Her body mass index is 29.6 kg/m².   Eyes:  Sclera are nonicteric.  Respiratory:  No labored breathing.  Cardiovascular:  No marked edema.  Skin:  No marked skin ulcers.  Neurological:  No marked sensory loss noted.  Psychiatric: Alert and oriented x3.    bilateral KNEE EXAM:  APPEARANCE- No redness, swelling or inflammation.  mild effusion.  ALIGNMENT- mild varus deformity that is not correctable  STABILITY-  Negative posterior and anterior drawer sign. Minimal varus/ valgus laxity noted.  ROM-  ROM is 10 to 90 degrees.   STRENGTH/FUNCTION- 5/5 strength with flexion and extension   PERFUSION/SENSATION- Palpable distal pulses, sensation and capillary refill on the lower extremity.   GAIT- antalgic and stiff-legged  OTHER-  medial joint line TTP noted. Positive for crepitus.    No

## 2025-04-01 ENCOUNTER — TRANSCRIBE ORDERS (OUTPATIENT)
Facility: HOSPITAL | Age: 75
End: 2025-04-01

## 2025-04-01 DIAGNOSIS — M21.962 ACQUIRED DEFORMITY OF LEFT KNEE: Primary | ICD-10-CM

## 2025-04-04 ENCOUNTER — HOSPITAL ENCOUNTER (OUTPATIENT)
Facility: HOSPITAL | Age: 75
Discharge: HOME | End: 2025-04-04
Payer: MEDICARE

## 2025-04-04 DIAGNOSIS — M21.962 ACQUIRED DEFORMITY OF LEFT KNEE: ICD-10-CM

## 2025-04-04 PROCEDURE — 73700 CT LOWER EXTREMITY W/O DYE: CPT

## 2025-04-09 ENCOUNTER — HOSPITAL ENCOUNTER (OUTPATIENT)
Facility: HOSPITAL | Age: 75
Discharge: HOME | End: 2025-04-12
Payer: MEDICARE

## 2025-04-09 VITALS
DIASTOLIC BLOOD PRESSURE: 60 MMHG | HEIGHT: 66 IN | TEMPERATURE: 97.9 F | WEIGHT: 207.14 LBS | BODY MASS INDEX: 33.29 KG/M2 | OXYGEN SATURATION: 99 % | HEART RATE: 63 BPM | RESPIRATION RATE: 20 BRPM | SYSTOLIC BLOOD PRESSURE: 171 MMHG

## 2025-04-09 LAB
ABO + RH BLD: NORMAL
BLOOD GROUP ANTIBODIES SERPL: NORMAL
INR PPP: 0.9 (ref 0.9–1.1)
PROTHROMBIN TIME: 10 SEC (ref 9.2–11.2)
SPECIMEN EXP DATE BLD: NORMAL

## 2025-04-09 PROCEDURE — 86901 BLOOD TYPING SEROLOGIC RH(D): CPT

## 2025-04-09 PROCEDURE — 97530 THERAPEUTIC ACTIVITIES: CPT

## 2025-04-09 PROCEDURE — 97161 PT EVAL LOW COMPLEX 20 MIN: CPT

## 2025-04-09 PROCEDURE — 36415 COLL VENOUS BLD VENIPUNCTURE: CPT

## 2025-04-09 PROCEDURE — 86850 RBC ANTIBODY SCREEN: CPT

## 2025-04-09 PROCEDURE — 85610 PROTHROMBIN TIME: CPT

## 2025-04-09 PROCEDURE — 86900 BLOOD TYPING SEROLOGIC ABO: CPT

## 2025-04-09 RX ORDER — ACETAMINOPHEN 500 MG
1000 TABLET ORAL ONCE
OUTPATIENT
Start: 2025-04-16

## 2025-04-09 RX ORDER — IBUPROFEN 200 MG
200 TABLET ORAL EVERY 6 HOURS PRN
COMMUNITY

## 2025-04-09 RX ORDER — CELECOXIB 200 MG/1
200 CAPSULE ORAL ONCE
OUTPATIENT
Start: 2025-04-16

## 2025-04-09 RX ORDER — M-VIT,TX,IRON,MINS/CALC/FOLIC 27MG-0.4MG
1 TABLET ORAL DAILY
COMMUNITY

## 2025-04-09 RX ORDER — SODIUM CHLORIDE, SODIUM LACTATE, POTASSIUM CHLORIDE, CALCIUM CHLORIDE 600; 310; 30; 20 MG/100ML; MG/100ML; MG/100ML; MG/100ML
INJECTION, SOLUTION INTRAVENOUS CONTINUOUS
OUTPATIENT
Start: 2025-04-16

## 2025-04-09 ASSESSMENT — KOOS JR
TWISING OR PIVOTING ON KNEE: MODERATE
STANDING UPRIGHT: MODERATE
STRAIGHTENING KNEE FULLY: MODERATE
GOING UP OR DOWN STAIRS: SEVERE
KOOS JR TOTAL INTERVAL SCORE: 57.14
RISING FROM SITTING: MODERATE
HOW SEVERE IS YOUR KNEE STIFFNESS AFTER FIRST WAKING IN MORNING: MILD

## 2025-04-09 ASSESSMENT — PROMIS GLOBAL HEALTH SCALE
TO WHAT EXTENT ARE YOU ABLE TO CARRY OUT YOUR EVERYDAY PHYSICAL ACTIVITIES SUCH AS WALKING, CLIMBING STAIRS, CARRYING GROCERIES, OR MOVING A CHAIR [ON A SCALE OF 1 (NOT AT ALL) TO 5 (COMPLETELY)]?: COMPLETELY
IN THE PAST 7 DAYS, HOW WOULD YOU RATE YOUR FATIGUE ON AVERAGE [ON A SCALE FROM 1 (NONE) TO 5 (VERY SEVERE)]?: MILD
IN GENERAL, PLEASE RATE HOW WELL YOU CARRY OUT YOUR USUAL SOCIAL ACTIVITIES (INCLUDES ACTIVITIES AT HOME, AT WORK, AND IN YOUR COMMUNITY, AND RESPONSIBILITIES AS A PARENT, CHILD, SPOUSE, EMPLOYEE, FRIEND, ETC) [ON A SCALE OF 1 (POOR) TO 5 (EXCELLENT)]?: VERY GOOD
IN THE PAST 7 DAYS, HOW WOULD YOU RATE YOUR PAIN ON AVERAGE [ON A SCALE FROM 0 (NO PAIN) TO 10 (WORST IMAGINABLE PAIN)]?: 5
IN GENERAL, HOW WOULD YOU RATE YOUR MENTAL HEALTH, INCLUDING YOUR MOOD AND YOUR ABILITY TO THINK [ON A SCALE OF 1 (POOR) TO 5 (EXCELLENT)]?: EXCELLENT
SUM OF RESPONSES TO QUESTIONS 2, 4, 5, & 10: 19
WHO IS THE PERSON COMPLETING THE PROMIS V1.1 SURVEY?: SELF
IN GENERAL, WOULD YOU SAY YOUR QUALITY OF LIFE IS...[ON A SCALE OF 1 (POOR) TO 5 (EXCELLENT)]: EXCELLENT
HOW IS THE PROMIS V1.1 BEING ADMINISTERED?: PAPER
IN THE PAST 7 DAYS, HOW OFTEN HAVE YOU BEEN BOTHERED BY EMOTIONAL PROBLEMS, SUCH AS FEELING ANXIOUS, DEPRESSED, OR IRRITABLE [ON A SCALE FROM 1 (NEVER) TO 5 (ALWAYS)]?: RARELY
IN GENERAL, HOW WOULD YOU RATE YOUR PHYSICAL HEALTH [ON A SCALE OF 1 (POOR) TO 5 (EXCELLENT)]?: GOOD
SUM OF RESPONSES TO QUESTIONS 3, 6, 7, & 8: 17
IN GENERAL, HOW WOULD YOU RATE YOUR SATISFACTION WITH YOUR SOCIAL ACTIVITIES AND RELATIONSHIPS [ON A SCALE OF 1 (POOR) TO 5 (EXCELLENT)]?: EXCELLENT
IN GENERAL, WOULD YOU SAY YOUR HEALTH IS...[ON A SCALE OF 1 (POOR) TO 5 (EXCELLENT)]: GOOD

## 2025-04-09 ASSESSMENT — PAIN DESCRIPTION - LOCATION: LOCATION: KNEE

## 2025-04-09 ASSESSMENT — PAIN DESCRIPTION - ORIENTATION: ORIENTATION: LEFT

## 2025-04-09 ASSESSMENT — PAIN SCALES - GENERAL: PAINLEVEL_OUTOF10: 5

## 2025-04-10 LAB
BACTERIA SPEC CULT: NORMAL
BACTERIA SPEC CULT: NORMAL
SERVICE CMNT-IMP: NORMAL

## 2025-04-10 NOTE — PROGRESS NOTES
Chief Complaint:   Chief Complaint   Patient presents with   • Foot Pain   • Headache New Onset on New Symptom        History of Present Illness: Sophy Wall is a 52 year old No obstetric history on file.  This 52 year old female is admitted for severe anemia.She is still having periods bt they are irregular and she states that she gets one every 2-3 months. The periods only last 3-4 days and the first days are heavy for her and then lighter after that. She only uses 3-4 pads a day. She does not consider the periods as heavy. She states that she has been anemic for 20 years because she had gastric bypass and she does not absorb iron well. She has been transfused before for the iron deficiency anemia. She also has been having more frequent bouts of hot flashes.        Past Medical History:   Past Medical History:   Diagnosis Date   • Anemia    • No pertinent past medical history         Past Surgical History:   Past Surgical History:   Procedure Laterality Date   • Gastric bypass     • No past surgeries          Family History: No family history on file.     Social History:   Social History     Tobacco Use   • Smoking status: Never   • Smokeless tobacco: Never   Substance Use Topics   • Alcohol use: Never   • Drug use: Never       Allergies: ALLERGIES:  Patient has no known allergies.     Medications:   Current Facility-Administered Medications   Medication   • ferrous sulfate (65 mg Fe per 325 mg) tablet 325 mg   • sodium chloride 0.9% infusion   • ceFAZolin (ANCEF) syringe 2,000 mg   • acetaminophen (TYLENOL) tablet 650 mg   • ondansetron (ZOFRAN) injection 4 mg   • diphenhydrAMINE (BENADRYL) capsule 25 mg        Review of Systems:  Constitutional: Denies fatigue, excessive weakness, fever, or chills  HEENT: Denies sore throat, visual changes, or sinus drainage  Cardiovascular: Denies chest pain or palpitations  Respiratory: Denies shortness of breath, cough, or wheezing  Breast: Denies pain, mass or 
4/10/2025: Patient reports that she does not have RW for use at home after DOS.  Ordered RW through [employer] for DOS 2025.  Patient did not attend class and was not able to view pre-op video at PAT visit d/t time constraints.  LVM to discuss with patient, requested CB.    PROMs/KOOs completed 2025.  KNEE DISABILITY OSTEOARTHRITIS AND OUTCOME SCORE    Stiffness - The following question concerns the amount of joing stiffness you have experienced during the last week in your knee. Stiffness is a sensation of restriction or slowness in the ease with which you move your knee joint.  How severe is your knee stiffness after first wakening in the morning?: 1        Pain - What amount of knee pain have you experienced the last week during the following activities?  Twisting/pivoting on your knee: 2  Straightening knee fully: 2  Going up or down stairs: 3  Standing upright: 2        Function - Please indicate the degree of difficulty you have experienced in the last week due to your knee.  Rising from sittin  Bending to floor/ an object: 0        Raw Score  Jr ROBERT. Knee Survey Score: 12  KOOS JR Total Interval Score (0-100 Scale): 57.14      PROMIS Questions    In general, would you say your health is:: 3    In general, would you say your quality of life is:: 5    In general, how would you rate your physical health?: 3    In general, how would you rate your mental health, including your mood and your ability to think?: 5    To what extent are you able to carry out your everyday physical activities such as walking, climbing stairs, carrying groceries, or moving a chair?: 5    In the past 7 days how often have you been bothered by emotional problems such as feeling anxious, depressed or irritable?: 4    In the past 7 days how would you rate your fatigue on average?: 4    In the past 7 days how would you rate your pain on average?: 5    In general, please rate how well you carry out your usual 
EKG in [facility] care from 6/4/24 and not repeated today.  
Incentive Spirometer        Using the incentive spirometer helps expand the small air sacs of your lungs, helps you breathe deeply, and helps improve your lung function.  Use your incentive spirometer twice a day (10 breaths each time) prior to surgery.      How to Use Your Incentive Spirometer:  Hold the incentive spirometer in an upright position.   Breathe out as usual.   Place the mouthpiece in your mouth and seal your lips tightly around it.   Take a deep breath.  Breathe in slowly and as deeply as possible. Keep the blue flow rate guide between the arrows.   Hold your breath as long as possible. Then exhale slowly and allow the piston to fall to the bottom of the column.   Rest for a few seconds and repeat steps one through five at least 10 times.     PAT Tidal Volume__________2000________  x_________2_______  Date__________4/9/25_____________    BRING THE INCENTIVE SPIROMETER WITH YOU TO THE HOSPITAL ON THE DAY OF YOUR SURGERY.  Opportunity given to ask and answer questions as well as to observe return demonstration.    Patient signature_____________________________    Witness____________________________  
[facility]  Physical Therapy Pre-surgery evaluation  [address] [place] [address]    PHYSICAL THERAPY PRE TKR SURGERY EVALUATION    Date: 2025  Patient: Aleksander Martin (75 y.o. female)  : 1950  Medical Diagnosis: No admission diagnoses are documented for this encounter.  Procedure(s) (LRB):  LEFT TOTAL KNEE REPLACEMENT (CECELIA)-FAST TRACK (Left)     Treatment Diagnosis: M25.562  LEFT KNEE PAIN    Referral Source: Davide Alonso MD  Provider #: Davide Alonso   NPI#:  [id number]   Precautions:        ASSESSMENT :  Based on the objective data described below, the patient presents with impaired gait, balance, pain, and overall high level functional mobility due to end stage degenerative joint disease in the left knee.     Discussed anticipated disposition to home with possible discharge within a 0 to 2 day time frame post-surgery. Patient's  was not present for the session. Patient in agreement. Patient has been educated on the recommendation for reliable help following surgery and has arranged for at least 24 hours of care after discharge.        The patient indicated she is  interested to discharge day of surgery if all discharge criteria met. Patient voiced good  understanding of therapy specific criteria to discharge day of surgery. Patient with good potential for discharging same day of surgery based on PMH, social support, current condition, and fall history.  Patient is appropriate for Fast Track program, educated on sequence of events and provided written copy of criteria.     Fast Track pathway: [x] YES [] NO   Patient agrees to arrange at least 24 hours of care following surgery: [x] YES [] NO   Patient has outpatient PT appointments scheduled:  [] YES [x] NO will call, wants to start one week after surgery       GOALS: (Goals have been discussed and agreed upon with patient.)  DISCHARGE GOALS: Time Frame: 1 DAY  Patient will demonstrate increased strength, 
Orthopedic and Spine Patients:  Instructions on When You Can   Eat or Drink Before Surgery      You have been provided a pre-surgery drink received at your pre-admission testing appointment.    Night before surgery:  You should drink 1/2 bottle of the  pre-surgery drink at bedtime. No food after midnight!    Day of Surgery:  Complete 2nd half of the bottle of the pre-surgery drink 1 hour prior to arrival at hospital.  For questions call Pre-Admission Testing at [phone].  They are available from 8:00am-5:00pm, Monday through Friday.  
The [facility] \"Your Path to a More Active Life\" orthopedic total knee or total hip educational video and the [facility] [place] patient handbook provided & reviewed during the patients pre-admission testing (PAT) appointment. An opportunity for questions was provided, patient verbalized understanding.     Ms Martin was not able to view the video during her appointment today.  She was given the link to watch it at home and stated she would look at it later today.  
down, no ponytails, buns, chiara pins or clips.  All body piercings must be removed. Please do not shave for 48 hours prior to surgery. Shaving of the face is acceptable. Please see the attached Soap/Hibiclens bathing instructions.    7. You should understand that if you do not follow these instructions your surgery may be cancelled.  If your physical condition changes (I.e. fever, cold or flu) please contact your surgeon as soon as possible.    8. It is important that you be on time.  If a situation occurs where you may be late, please call [phone] (OR Holding Area).    9. If you have any questions and or problems, please call [phone] (Pre-admission Testing).    10. Your surgery time may be subject to change.  You will receive a phone call the evening prior with your time of arrival.    11.  If having outpatient surgery, you must have someone to drive you here, stay with you during the duration of your stay, and to drive you home at time of discharge.    12. The following link is for the educational video for patients and/or families.    [email or web address]    Special Instructions: Follow instructions from Dr Alonso.  No vitamins or supplements for 1 week prior to surgery.  Hold Ibuprofen for 5 days prior to surgery.  Tylenol is okay to take if needed for pain.      TAKE ALL MEDICATIONS THE DAY OF SURGERY EXCEPT: No Metformin  No Tylenol      I understand a pre-operative phone call will be made to verify my surgery time.  In the event that I am not available, I give permission for a message to be left on my answering service and/or with another person?  yes         ___________________      __________   _________    (Signature of Patient)             (Witness)                (Date and Time)  
nipple discharge  Gastrointestinal: Denies nausea, vomiting, diarrhea, or constipation  Female genitourinary: Denies abnormal vaginal discharge, irritation, burning, or malodor. Denies dysuria, frequency, or urgency  Musculoskeletal: Denies joint pain or swelling  Integumentary: Denies rash, erythema, or irritation  Neurologic: Denies headache or dizziness  Psychiatric: Denies depression or suicidal or homicidal ideations    Physical Examination:  Blood pressure 121/58, pulse 72, temperature 98.2 °F (36.8 °C), temperature source Oral, resp. rate 17, height 5' 10\" (1.778 m), weight 93.1 kg, SpO2 100 %.  Physical Exam   Constitutional: She is oriented to person, place, and time. She appears well-developed and well-nourished.   HENT:   Head: Normocephalic.   Eyes: Pupils are equal, round, and reactive to light.   Neck: Normal range of motion. Neck supple. Thyroid not palpable   Cardiovascular: Normal rate, regular rhythm and normal heart sounds.   Pulmonary/Chest: Breath sounds normal.   Abdominal: Bowel sounds are normal.   Musculoskeletal: Normal range of motion.   Neurological: She is alert and oriented to person, place, and time.   Skin: Skin is warm.   Psychiatric: She has a normal mood and affect.   Pelvic: Normal sized uterus. No fibroids on US. The endometrial stripe is slightly thickened at 16 mm.          Labs:   Recent Results (from the past 48 hour(s))   Electrocardiogram 12-Lead    Collection Time: 06/27/23  4:39 PM   Result Value Ref Range    Ventricular Rate EKG/Min (BPM) 63     NV-Interval (MSEC) 154     QRS-Interval (MSEC) 83     QT-Interval (MSEC) 394     QTc 402     P Axis (Degrees) 41     T Axis (Degrees) 30     REPORT TEXT       SINUS RHYTHM  POSSIBLE ANTERIOR MYOCARDIAL INFARCTION, PROBABLY OLD  BORDERLINE ECG  UNCONFIRMED REPORT  Confirmed by REYNALDO ALMEIDA M.D. ([id number]) on 6/28/2023 4:09:21 PM     Urinalysis & Reflex Microscopy With Culture If Indicated    Collection Time: 06/27/23  5:10 PM 
  Result Value Ref Range    COLOR, URINALYSIS Straw     APPEARANCE, URINALYSIS Cloudy     GLUCOSE, URINALYSIS Negative Negative mg/dL    BILIRUBIN, URINALYSIS Negative Negative    KETONES, URINALYSIS Negative Negative mg/dL    SPECIFIC GRAVITY, URINALYSIS 1.013 1.005 - 1.030    OCCULT BLOOD, URINALYSIS Large (A) Negative    PH, URINALYSIS 6.5 5.0 - 7.0    PROTEIN, URINALYSIS Negative Negative mg/dL    UROBILINOGEN, URINALYSIS 0.2 0.2, 1.0 mg/dL    NITRITE, URINALYSIS Positive (A) Negative    LEUKOCYTE ESTERASE, URINALYSIS Moderate (A) Negative    SQUAMOUS EPITHELIAL, URINALYSIS 1 to 5 None Seen, 1 to 5 /hpf    ERYTHROCYTES, URINALYSIS 26 to 100 (A) None Seen, 1 to 2 /hpf    LEUKOCYTES, URINALYSIS 26 to 100 (A) None Seen, 1 to 5 /hpf    BACTERIA, URINALYSIS Few (A) None Seen /hpf    HYALINE CASTS, URINALYSIS None Seen None Seen, 1 to 5 /lpf    MUCUS Present    Comprehensive Metabolic Panel    Collection Time: 06/27/23  5:10 PM   Result Value Ref Range    Fasting Status      Sodium 138 135 - 145 mmol/L    Potassium 3.9 3.4 - 5.1 mmol/L    Chloride 105 97 - 110 mmol/L    Carbon Dioxide 29 21 - 32 mmol/L    Anion Gap 8 7 - 19 mmol/L    Glucose 85 70 - 99 mg/dL    BUN 9 6 - 20 mg/dL    Creatinine 0.57 0.51 - 0.95 mg/dL    Glomerular Filtration Rate >90 >=60    BUN/Cr 16 7 - 25    Calcium 8.2 (L) 8.4 - 10.2 mg/dL    Bilirubin, Total 0.3 0.2 - 1.0 mg/dL    GOT/AST 12 <=37 Units/L    GPT/ALT 10 <64 Units/L    Alkaline Phosphatase 80 45 - 117 Units/L    Albumin 3.5 (L) 3.6 - 5.1 g/dL    Protein, Total 6.6 6.4 - 8.2 g/dL    Globulin 3.1 2.0 - 4.0 g/dL    A/G Ratio 1.1 1.0 - 2.4   TROPONIN I, HIGH SENSITIVITY    Collection Time: 06/27/23  5:10 PM   Result Value Ref Range    Troponin I, High Sensitivity 6 <52 ng/L   CBC with Automated Differential (performable only)    Collection Time: 06/27/23  5:10 PM   Result Value Ref Range    WBC 4.4 4.2 - 11.0 K/mcL    RBC 3.41 (L) 4.00 - 5.20 mil/mcL    HGB 5.0 (LL) 12.0 - 15.5 g/dL    
healing and prevent you from healing completely.    If you lose your Hibiclens/chlorhexidine, please call the Surgery Center, or it is available for purchase at your pharmacy.               ___________________      ___________________      ________________  (Signature of Patient)          (Witness)                   (Date and Time)  
HCT 20.6 (L) 36.0 - 46.5 %    MCV 60.4 (L) 78.0 - 100.0 fl    MCH 14.7 (L) 26.0 - 34.0 pg    MCHC 24.3 (L) 32.0 - 36.5 g/dL    RDW-CV 20.4 (H) 11.0 - 15.0 %    RDW-SD 43.2 39.0 - 50.0 fL     140 - 450 K/mcL    NRBC 0 <=0 /100 WBC    Neutrophil, Percent 53 %    Lymphocytes, Percent 30 %    Mono, Percent 13 %    Eosinophils, Percent 3 %    Basophils, Percent 1 %    Immature Granulocytes 0 %    Absolute Neutrophils 2.4 1.8 - 7.7 K/mcL    Absolute Lymphocytes 1.3 1.0 - 4.0 K/mcL    Absolute Monocytes 0.6 0.3 - 0.9 K/mcL    Absolute Eosinophils  0.1 0.0 - 0.5 K/mcL    Absolute Basophils 0.0 0.0 - 0.3 K/mcL    Absolute Immature Granulocytes 0.0 0.0 - 0.2 K/mcL   TYPE/SCREEN    Collection Time: 06/27/23  5:10 PM   Result Value Ref Range    ABO/RH(D) O Rh Positive     ANTIBODY SCREEN Positive     TYPE AND SCREEN EXPIRATION DATE 06/30/2023 23:59    Magnesium    Collection Time: 06/27/23  5:10 PM   Result Value Ref Range    Magnesium 2.1 1.7 - 2.4 mg/dL   Light Blue Top    Collection Time: 06/27/23  5:10 PM   Result Value Ref Range    Extra Tube Hold for Add Ons    Urine, Bacterial Culture    Collection Time: 06/27/23  5:10 PM    Specimen: Urine clean catch   Result Value Ref Range    Urine, Bacterial Culture Culture in progress.     Urine, Bacterial Culture >100,000 CFU/mL Gram Negative Bacilli (A)    Antibody ID    Collection Time: 06/27/23  5:10 PM   Result Value Ref Range    ANTIBODY ID Anti-K    Ferritin    Collection Time: 06/27/23  5:15 PM   Result Value Ref Range    Ferritin 1 (L) 8 - 252 ng/mL   Folate    Collection Time: 06/27/23  5:15 PM   Result Value Ref Range    Folate 9.0 >=5.5 ng/mL   Vitamin B12    Collection Time: 06/27/23  5:15 PM   Result Value Ref Range    Vitamin B12 323 211 - 911 pg/mL   Transferrin    Collection Time: 06/27/23  5:15 PM   Result Value Ref Range    Transferrin 358 200 - 360 mg/dL   Iron And total Iron Binding Capacity    Collection Time: 06/27/23  5:15 PM   Result Value Ref Range 
VIEWS) 03/31/2025    Narrative  Views: Standing AP, [place], Lat, [place].    Impression  Multiple views of knee demonstrate severe, bone on bone degenerative  arthritis of the knee. There is mild varus deformity. No other significant  findings are noted.         Skin:  Denies open wounds, cuts, sores, rashes or other areas of concern in PAT assessment.          Suzanne Hadley, MSN, APRN  Nurse Practitioner for Pre-Admission Testing  [phone]        
   Iron 8 (L) 50 - 170 mcg/dL    Iron Binding Capacity 457 (H) 250 - 450 mcg/dL    Iron, Percent Saturation 2 (L) 15 - 45 %   Gold Top    Collection Time: 06/27/23  5:15 PM   Result Value Ref Range    Extra Tube Hold for Add Ons    Prepare Red Blood Cells: 2 Units    Collection Time: 06/27/23  5:38 PM   Result Value Ref Range    UNIT BLOOD TYPE O Pos     ISBT BLOOD TYPE 5100     BLOOD EXPIRATION DATE 88982636090338     UNIT NUMBER [id number]     DISPENSE STATUS Transfused     PRODUCT ID Red Blood Cells     PRODUCT CODE [id number]     PRODUCT DESCRIPTION RBC AS-1 LR     UNIT BLOOD TYPE O Pos     ISBT BLOOD TYPE 5100     BLOOD EXPIRATION DATE [id number]     UNIT NUMBER [id number]     DISPENSE STATUS Transfused     PRODUCT ID Red Blood Cells     PRODUCT CODE [id number]     PRODUCT DESCRIPTION RBC AS-1 LR     CROSSMATCH RESULT Compatible     CROSSMATCH RESULT Compatible     ISSUE DATE/TIME [id number]     ISSUE DATE/TIME [id number]    HCG POC    Collection Time: 06/27/23  6:49 PM   Result Value Ref Range    HCG, URINE - POINT OF CARE Negative Negative   Hemoglobin and Hematocrit    Collection Time: 06/28/23  1:43 AM   Result Value Ref Range    HGB 5.4 (LL) 12.0 - 15.5 g/dL    HCT 22.0 (L) 36.0 - 46.5 %   Light Green Top    Collection Time: 06/28/23  1:55 AM   Result Value Ref Range    Extra Tube Hold for Add Ons    Basic Metabolic Panel    Collection Time: 06/28/23  7:03 AM   Result Value Ref Range    Fasting Status      Sodium 140 135 - 145 mmol/L    Potassium 4.5 3.4 - 5.1 mmol/L    Chloride 108 97 - 110 mmol/L    Carbon Dioxide 23 21 - 32 mmol/L    Anion Gap 14 7 - 19 mmol/L    Glucose 85 70 - 99 mg/dL    BUN 8 6 - 20 mg/dL    Creatinine 0.57 0.51 - 0.95 mg/dL    Glomerular Filtration Rate >90 >=60    BUN/Cr 14 7 - 25    Calcium 8.0 (L) 8.4 - 10.2 mg/dL   Magnesium    Collection Time: 06/28/23  7:03 AM   Result Value Ref Range    Magnesium 2.3 1.7 - 2.4 mg/dL   CBC No Differential    Collection Time: 
06/28/23  7:03 AM   Result Value Ref Range    WBC 3.5 (L) 4.2 - 11.0 K/mcL    RBC 3.98 (L) 4.00 - 5.20 mil/mcL    HGB 7.2 (L) 12.0 - 15.5 g/dL    HCT 26.9 (L) 36.0 - 46.5 %    MCV 67.6 (L) 78.0 - 100.0 fl    MCH 18.1 (L) 26.0 - 34.0 pg    MCHC 26.8 (L) 32.0 - 36.5 g/dL     140 - 450 K/mcL    RDW-CV 26.8 (H) 11.0 - 15.0 %    RDW-SD 63.0 (H) 39.0 - 50.0 fL    NRBC 0 <=0 /100 WBC   Gold Top    Collection Time: 06/28/23  7:03 AM   Result Value Ref Range    Extra Tube Hold for Add Ons    C Reactive Protein    Collection Time: 06/28/23  7:03 AM   Result Value Ref Range    C-Reactive Protein <0.3 <=1.0 mg/dL   Sedimentation Rate    Collection Time: 06/28/23  7:03 AM   Result Value Ref Range    RBC Sedimentation Rate 1 0 - 20 mm/hr   Hemoglobin and Hematocrit    Collection Time: 06/28/23  7:25 PM   Result Value Ref Range    HGB 7.2 (L) 12.0 - 15.5 g/dL    HCT 27.0 (L) 36.0 - 46.5 %        Assessment/Plan: Anemic due to gastric bypass. Normal periods at age 52. Thickened stripe. A outpatient biopsy of the endometrium . No treatment at this time.          Roland Sequeira MD

## 2025-04-15 NOTE — DISCHARGE INSTRUCTIONS
Discharge Instructions:  Aleksander Martin    Surgery: TOTAL KNEE REPLACEMENT.        To relieve pain:  Use ice/gel packs.    -Put the ice pack directly over the wound, or anywhere you are hurting or swollen.   -To control pain and swelling, keep ice on regularly, especially after physical activity.  -The packs should stay cold for 3-4 hours.  When it is not cold anymore, rotate with the packs in the freezer.      Elevate your leg.  This will also keep swelling down.    Rest for at least 20 minutes between activity or exercises.    To keep track of your pain medications, write down what you take and when you take it.    The last dose of pain medication you got in the hospital was:     Medication    Dose    Date & Time          To keep your pain under control, you can take Tylenol every 6 hours while you are awake  for the first week.     Take your as needed pain medications  as prescribed on the bottle.     To prevent nausea, take your pain medications with food.          As your pain lessens:    Slowly start taking less pain medication. You may do this by waiting longer between doses or by taking smaller doses.    Stop using the pain medications as soon as you no longer need it, usually in 1-2 weeks         Aspirin  To prevent blood clots, you will need to take Aspirin 81 mg twice a day for 30 days.              To prevent stomach upset or bleeding:  Take Pepcid 20 mg twice a day, or a similar home medication, while you are taking a blood thinner.         Keep your waterproof dressing in place. It will be removed by your surgeon during your follow-up appointment in 2 weeks.     You may need to change the dressing if you are having drainage to where the dressing is no longer intact. You will be given an extra dressing to use at home.    You will have some swelling, warmth, and bruising around the incision and up and down the leg after surgery.  This will may get worse in the first few days you are home and will

## 2025-04-16 ENCOUNTER — HOSPITAL ENCOUNTER (OUTPATIENT)
Facility: HOSPITAL | Age: 75
Setting detail: OBSERVATION
Discharge: HOME | End: 2025-04-16
Attending: ORTHOPAEDIC SURGERY | Admitting: ORTHOPAEDIC SURGERY
Payer: MEDICARE

## 2025-04-16 ENCOUNTER — ANESTHESIA (OUTPATIENT)
Facility: HOSPITAL | Age: 75
End: 2025-04-16
Payer: MEDICARE

## 2025-04-16 ENCOUNTER — ANESTHESIA EVENT (OUTPATIENT)
Facility: HOSPITAL | Age: 75
End: 2025-04-16
Payer: MEDICARE

## 2025-04-16 VITALS
HEART RATE: 64 BPM | DIASTOLIC BLOOD PRESSURE: 94 MMHG | SYSTOLIC BLOOD PRESSURE: 169 MMHG | WEIGHT: 193.56 LBS | BODY MASS INDEX: 31.11 KG/M2 | OXYGEN SATURATION: 93 % | HEIGHT: 66 IN | RESPIRATION RATE: 14 BRPM | TEMPERATURE: 97.9 F

## 2025-04-16 DIAGNOSIS — E11.65 TYPE 2 DIABETES MELLITUS WITH HYPERGLYCEMIA (HCC): ICD-10-CM

## 2025-04-16 DIAGNOSIS — Z96.652 S/P TOTAL KNEE REPLACEMENT, LEFT: Primary | ICD-10-CM

## 2025-04-16 PROBLEM — M17.12 PRIMARY OSTEOARTHRITIS OF LEFT KNEE: Status: ACTIVE | Noted: 2025-04-16

## 2025-04-16 LAB
GLUCOSE BLD STRIP.AUTO-MCNC: 163 MG/DL (ref 65–117)
SERVICE CMNT-IMP: ABNORMAL

## 2025-04-16 PROCEDURE — 97116 GAIT TRAINING THERAPY: CPT

## 2025-04-16 PROCEDURE — G0378 HOSPITAL OBSERVATION PER HR: HCPCS

## 2025-04-16 PROCEDURE — 2500000003 HC RX 250 WO HCPCS: Performed by: ORTHOPAEDIC SURGERY

## 2025-04-16 PROCEDURE — 3600000005 HC SURGERY LEVEL 5 BASE: Performed by: ORTHOPAEDIC SURGERY

## 2025-04-16 PROCEDURE — 7100000011 HC PHASE II RECOVERY - ADDTL 15 MIN: Performed by: ORTHOPAEDIC SURGERY

## 2025-04-16 PROCEDURE — 7100000000 HC PACU RECOVERY - FIRST 15 MIN: Performed by: ORTHOPAEDIC SURGERY

## 2025-04-16 PROCEDURE — 3600000015 HC SURGERY LEVEL 5 ADDTL 15MIN: Performed by: ORTHOPAEDIC SURGERY

## 2025-04-16 PROCEDURE — 2720000010 HC SURG SUPPLY STERILE: Performed by: ORTHOPAEDIC SURGERY

## 2025-04-16 PROCEDURE — 97530 THERAPEUTIC ACTIVITIES: CPT

## 2025-04-16 PROCEDURE — 6360000002 HC RX W HCPCS: Performed by: ORTHOPAEDIC SURGERY

## 2025-04-16 PROCEDURE — 82962 GLUCOSE BLOOD TEST: CPT

## 2025-04-16 PROCEDURE — 64447 NJX AA&/STRD FEMORAL NRV IMG: CPT | Performed by: STUDENT IN AN ORGANIZED HEALTH CARE EDUCATION/TRAINING PROGRAM

## 2025-04-16 PROCEDURE — 6360000002 HC RX W HCPCS: Performed by: NURSE ANESTHETIST, CERTIFIED REGISTERED

## 2025-04-16 PROCEDURE — 2580000003 HC RX 258: Performed by: NURSE ANESTHETIST, CERTIFIED REGISTERED

## 2025-04-16 PROCEDURE — 2580000003 HC RX 258

## 2025-04-16 PROCEDURE — 2500000003 HC RX 250 WO HCPCS: Performed by: NURSE ANESTHETIST, CERTIFIED REGISTERED

## 2025-04-16 PROCEDURE — 7100000001 HC PACU RECOVERY - ADDTL 15 MIN: Performed by: ORTHOPAEDIC SURGERY

## 2025-04-16 PROCEDURE — 2709999900 HC NON-CHARGEABLE SUPPLY: Performed by: ORTHOPAEDIC SURGERY

## 2025-04-16 PROCEDURE — 7100000010 HC PHASE II RECOVERY - FIRST 15 MIN: Performed by: ORTHOPAEDIC SURGERY

## 2025-04-16 PROCEDURE — 6370000000 HC RX 637 (ALT 250 FOR IP)

## 2025-04-16 PROCEDURE — C1776 JOINT DEVICE (IMPLANTABLE): HCPCS | Performed by: ORTHOPAEDIC SURGERY

## 2025-04-16 PROCEDURE — 6360000002 HC RX W HCPCS: Performed by: STUDENT IN AN ORGANIZED HEALTH CARE EDUCATION/TRAINING PROGRAM

## 2025-04-16 PROCEDURE — APPNB180 APP NON BILLABLE TIME > 60 MINS

## 2025-04-16 PROCEDURE — 2580000003 HC RX 258: Performed by: ANESTHESIOLOGY

## 2025-04-16 PROCEDURE — 6360000002 HC RX W HCPCS

## 2025-04-16 PROCEDURE — 6370000000 HC RX 637 (ALT 250 FOR IP): Performed by: ORTHOPAEDIC SURGERY

## 2025-04-16 PROCEDURE — 3700000000 HC ANESTHESIA ATTENDED CARE: Performed by: ORTHOPAEDIC SURGERY

## 2025-04-16 PROCEDURE — 3700000001 HC ADD 15 MINUTES (ANESTHESIA): Performed by: ORTHOPAEDIC SURGERY

## 2025-04-16 PROCEDURE — 97161 PT EVAL LOW COMPLEX 20 MIN: CPT

## 2025-04-16 DEVICE — TIBIAL COMPONENT
Type: IMPLANTABLE DEVICE | Site: KNEE | Status: FUNCTIONAL
Brand: TRIATHLON

## 2025-04-16 DEVICE — CRUCIATE RETAINING FEMORAL
Type: IMPLANTABLE DEVICE | Site: KNEE | Status: FUNCTIONAL
Brand: TRIATHLON

## 2025-04-16 DEVICE — TIBIAL BEARING INSERT - CS
Type: IMPLANTABLE DEVICE | Site: KNEE | Status: FUNCTIONAL
Brand: TRIATHLON

## 2025-04-16 RX ORDER — SODIUM CHLORIDE, SODIUM LACTATE, POTASSIUM CHLORIDE, CALCIUM CHLORIDE 600; 310; 30; 20 MG/100ML; MG/100ML; MG/100ML; MG/100ML
INJECTION, SOLUTION INTRAVENOUS CONTINUOUS
Status: DISCONTINUED | OUTPATIENT
Start: 2025-04-16 | End: 2025-04-16 | Stop reason: HOSPADM

## 2025-04-16 RX ORDER — ASPIRIN 81 MG/1
81 TABLET ORAL DAILY
Qty: 30 TABLET | Refills: 0 | Status: SHIPPED | OUTPATIENT
Start: 2025-04-16 | End: 2025-05-16

## 2025-04-16 RX ORDER — EPHEDRINE SULFATE/0.9% NACL/PF 25 MG/5 ML
SYRINGE (ML) INTRAVENOUS
Status: DISCONTINUED | OUTPATIENT
Start: 2025-04-16 | End: 2025-04-16 | Stop reason: SDUPTHER

## 2025-04-16 RX ORDER — FAMOTIDINE 20 MG/1
20 TABLET, FILM COATED ORAL 2 TIMES DAILY
Status: DISCONTINUED | OUTPATIENT
Start: 2025-04-16 | End: 2025-04-16 | Stop reason: HOSPADM

## 2025-04-16 RX ORDER — OXYCODONE HYDROCHLORIDE 5 MG/1
5 TABLET ORAL EVERY 4 HOURS PRN
Qty: 40 TABLET | Refills: 0 | Status: SHIPPED | OUTPATIENT
Start: 2025-04-16 | End: 2025-04-23

## 2025-04-16 RX ORDER — ONDANSETRON 2 MG/ML
4 INJECTION INTRAMUSCULAR; INTRAVENOUS EVERY 6 HOURS PRN
Status: CANCELLED | OUTPATIENT
Start: 2025-04-16

## 2025-04-16 RX ORDER — ACETAMINOPHEN 500 MG
1000 TABLET ORAL EVERY 8 HOURS SCHEDULED
Status: CANCELLED | OUTPATIENT
Start: 2025-04-16

## 2025-04-16 RX ORDER — OXYCODONE HYDROCHLORIDE 5 MG/1
5 TABLET ORAL EVERY 4 HOURS PRN
Status: DISCONTINUED | OUTPATIENT
Start: 2025-04-16 | End: 2025-04-16 | Stop reason: HOSPADM

## 2025-04-16 RX ORDER — HYDROCHLOROTHIAZIDE 25 MG/1
25 TABLET ORAL DAILY
Status: CANCELLED | OUTPATIENT
Start: 2025-04-17

## 2025-04-16 RX ORDER — HYDROMORPHONE HYDROCHLORIDE 1 MG/ML
0.5 INJECTION, SOLUTION INTRAMUSCULAR; INTRAVENOUS; SUBCUTANEOUS EVERY 5 MIN PRN
Status: DISCONTINUED | OUTPATIENT
Start: 2025-04-16 | End: 2025-04-16 | Stop reason: HOSPADM

## 2025-04-16 RX ORDER — BUPIVACAINE HYDROCHLORIDE 5 MG/ML
INJECTION, SOLUTION EPIDURAL; INTRACAUDAL; PERINEURAL
Status: DISCONTINUED | OUTPATIENT
Start: 2025-04-16 | End: 2025-04-16 | Stop reason: SDUPTHER

## 2025-04-16 RX ORDER — KETOROLAC TROMETHAMINE 30 MG/ML
15 INJECTION, SOLUTION INTRAMUSCULAR; INTRAVENOUS EVERY 6 HOURS
Status: DISCONTINUED | OUTPATIENT
Start: 2025-04-16 | End: 2025-04-16 | Stop reason: HOSPADM

## 2025-04-16 RX ORDER — POLYETHYLENE GLYCOL 3350 17 G/17G
17 POWDER, FOR SOLUTION ORAL DAILY
Status: CANCELLED | OUTPATIENT
Start: 2025-04-16 | End: 2025-04-23

## 2025-04-16 RX ORDER — LISINOPRIL 10 MG/1
10 TABLET ORAL DAILY
Status: CANCELLED | OUTPATIENT
Start: 2025-04-17

## 2025-04-16 RX ORDER — GLUCAGON 1 MG/ML
1 KIT INJECTION PRN
Status: DISCONTINUED | OUTPATIENT
Start: 2025-04-16 | End: 2025-04-16 | Stop reason: HOSPADM

## 2025-04-16 RX ORDER — BUPIVACAINE HYDROCHLORIDE 5 MG/ML
INJECTION, SOLUTION EPIDURAL; INTRACAUDAL PRN
Status: DISCONTINUED | OUTPATIENT
Start: 2025-04-16 | End: 2025-04-16 | Stop reason: ALTCHOICE

## 2025-04-16 RX ORDER — SODIUM CHLORIDE 0.9 % (FLUSH) 0.9 %
5-40 SYRINGE (ML) INJECTION EVERY 12 HOURS SCHEDULED
Status: CANCELLED | OUTPATIENT
Start: 2025-04-16

## 2025-04-16 RX ORDER — SODIUM CHLORIDE 0.9 % (FLUSH) 0.9 %
5-40 SYRINGE (ML) INJECTION EVERY 12 HOURS SCHEDULED
Status: DISCONTINUED | OUTPATIENT
Start: 2025-04-16 | End: 2025-04-16 | Stop reason: HOSPADM

## 2025-04-16 RX ORDER — BISACODYL 10 MG
10 SUPPOSITORY, RECTAL RECTAL DAILY PRN
Status: CANCELLED | OUTPATIENT
Start: 2025-04-16

## 2025-04-16 RX ORDER — SODIUM CHLORIDE 9 MG/ML
INJECTION, SOLUTION INTRAVENOUS CONTINUOUS
Status: CANCELLED | OUTPATIENT
Start: 2025-04-16

## 2025-04-16 RX ORDER — SODIUM CHLORIDE 9 MG/ML
INJECTION, SOLUTION INTRAVENOUS PRN
Status: DISCONTINUED | OUTPATIENT
Start: 2025-04-16 | End: 2025-04-16 | Stop reason: HOSPADM

## 2025-04-16 RX ORDER — DIPHENHYDRAMINE HCL 25 MG
25 CAPSULE ORAL EVERY 6 HOURS PRN
Status: CANCELLED | OUTPATIENT
Start: 2025-04-16

## 2025-04-16 RX ORDER — ONDANSETRON 4 MG/1
4 TABLET, ORALLY DISINTEGRATING ORAL EVERY 8 HOURS PRN
Status: CANCELLED | OUTPATIENT
Start: 2025-04-16

## 2025-04-16 RX ORDER — 0.9 % SODIUM CHLORIDE 0.9 %
500 INTRAVENOUS SOLUTION INTRAVENOUS ONCE AS NEEDED
Status: CANCELLED | OUTPATIENT
Start: 2025-04-16

## 2025-04-16 RX ORDER — ESCITALOPRAM OXALATE 10 MG/1
10 TABLET ORAL DAILY
Status: CANCELLED | OUTPATIENT
Start: 2025-04-16

## 2025-04-16 RX ORDER — ASPIRIN 81 MG/1
81 TABLET ORAL 2 TIMES DAILY
Status: CANCELLED | OUTPATIENT
Start: 2025-04-16

## 2025-04-16 RX ORDER — IPRATROPIUM BROMIDE AND ALBUTEROL SULFATE 2.5; .5 MG/3ML; MG/3ML
1 SOLUTION RESPIRATORY (INHALATION)
Status: DISCONTINUED | OUTPATIENT
Start: 2025-04-16 | End: 2025-04-16 | Stop reason: HOSPADM

## 2025-04-16 RX ORDER — ONDANSETRON 2 MG/ML
4 INJECTION INTRAMUSCULAR; INTRAVENOUS
Status: DISCONTINUED | OUTPATIENT
Start: 2025-04-16 | End: 2025-04-16 | Stop reason: HOSPADM

## 2025-04-16 RX ORDER — SENNA AND DOCUSATE SODIUM 50; 8.6 MG/1; MG/1
1 TABLET, FILM COATED ORAL DAILY
Qty: 14 TABLET | Refills: 0 | Status: SHIPPED | OUTPATIENT
Start: 2025-04-16 | End: 2025-04-30

## 2025-04-16 RX ORDER — DEXAMETHASONE SODIUM PHOSPHATE 4 MG/ML
INJECTION, SOLUTION INTRA-ARTICULAR; INTRALESIONAL; INTRAMUSCULAR; INTRAVENOUS; SOFT TISSUE
Status: DISCONTINUED | OUTPATIENT
Start: 2025-04-16 | End: 2025-04-16 | Stop reason: SDUPTHER

## 2025-04-16 RX ORDER — ACETAMINOPHEN 500 MG
1000 TABLET ORAL ONCE
Status: COMPLETED | OUTPATIENT
Start: 2025-04-16 | End: 2025-04-16

## 2025-04-16 RX ORDER — FAMOTIDINE 20 MG/1
20 TABLET, FILM COATED ORAL DAILY
Qty: 30 TABLET | Refills: 0 | Status: SHIPPED | OUTPATIENT
Start: 2025-04-16

## 2025-04-16 RX ORDER — FENTANYL CITRATE 50 UG/ML
25 INJECTION, SOLUTION INTRAMUSCULAR; INTRAVENOUS EVERY 5 MIN PRN
Status: COMPLETED | OUTPATIENT
Start: 2025-04-16 | End: 2025-04-16

## 2025-04-16 RX ORDER — TRANEXAMIC ACID 100 MG/ML
INJECTION, SOLUTION INTRAVENOUS
Status: DISCONTINUED | OUTPATIENT
Start: 2025-04-16 | End: 2025-04-16 | Stop reason: SDUPTHER

## 2025-04-16 RX ORDER — CELECOXIB 200 MG/1
200 CAPSULE ORAL ONCE
Status: COMPLETED | OUTPATIENT
Start: 2025-04-16 | End: 2025-04-16

## 2025-04-16 RX ORDER — SODIUM CHLORIDE 0.9 % (FLUSH) 0.9 %
5-40 SYRINGE (ML) INJECTION PRN
Status: DISCONTINUED | OUTPATIENT
Start: 2025-04-16 | End: 2025-04-16 | Stop reason: HOSPADM

## 2025-04-16 RX ORDER — DEXAMETHASONE SODIUM PHOSPHATE 10 MG/ML
10 INJECTION, SOLUTION INTRAMUSCULAR; INTRAVENOUS ONCE
Status: DISCONTINUED | OUTPATIENT
Start: 2025-04-16 | End: 2025-04-16 | Stop reason: HOSPADM

## 2025-04-16 RX ORDER — ACETAMINOPHEN 500 MG
1000 TABLET ORAL EVERY 8 HOURS
Qty: 84 TABLET | Refills: 0 | Status: SHIPPED | OUTPATIENT
Start: 2025-04-16 | End: 2025-04-30

## 2025-04-16 RX ORDER — ACETAMINOPHEN 500 MG
1000 TABLET ORAL EVERY 8 HOURS
COMMUNITY
Start: 2025-04-16 | End: 2025-04-16

## 2025-04-16 RX ORDER — TRANEXAMIC ACID 650 MG/1
1950 TABLET ORAL
Status: CANCELLED | OUTPATIENT
Start: 2025-04-16 | End: 2025-04-19

## 2025-04-16 RX ORDER — NALOXONE HYDROCHLORIDE 0.4 MG/ML
INJECTION, SOLUTION INTRAMUSCULAR; INTRAVENOUS; SUBCUTANEOUS PRN
Status: DISCONTINUED | OUTPATIENT
Start: 2025-04-16 | End: 2025-04-16 | Stop reason: HOSPADM

## 2025-04-16 RX ORDER — DIPHENHYDRAMINE HYDROCHLORIDE 50 MG/ML
25 INJECTION, SOLUTION INTRAMUSCULAR; INTRAVENOUS EVERY 6 HOURS PRN
Status: CANCELLED | OUTPATIENT
Start: 2025-04-16

## 2025-04-16 RX ORDER — MORPHINE SULFATE 4 MG/ML
2 INJECTION, SOLUTION INTRAMUSCULAR; INTRAVENOUS
Refills: 0 | Status: CANCELLED | OUTPATIENT
Start: 2025-04-16 | End: 2025-04-17

## 2025-04-16 RX ORDER — SENNA AND DOCUSATE SODIUM 50; 8.6 MG/1; MG/1
1 TABLET, FILM COATED ORAL 2 TIMES DAILY
Status: CANCELLED | OUTPATIENT
Start: 2025-04-16 | End: 2025-04-23

## 2025-04-16 RX ORDER — SODIUM CHLORIDE 0.9 % (FLUSH) 0.9 %
5-40 SYRINGE (ML) INJECTION PRN
Status: CANCELLED | OUTPATIENT
Start: 2025-04-16

## 2025-04-16 RX ORDER — PROCHLORPERAZINE EDISYLATE 5 MG/ML
5 INJECTION INTRAMUSCULAR; INTRAVENOUS
Status: COMPLETED | OUTPATIENT
Start: 2025-04-16 | End: 2025-04-16

## 2025-04-16 RX ORDER — TRANEXAMIC ACID 100 MG/ML
INJECTION, SOLUTION INTRAVENOUS PRN
Status: DISCONTINUED | OUTPATIENT
Start: 2025-04-16 | End: 2025-04-16 | Stop reason: ALTCHOICE

## 2025-04-16 RX ORDER — DEXTROSE MONOHYDRATE 100 MG/ML
INJECTION, SOLUTION INTRAVENOUS CONTINUOUS PRN
Status: DISCONTINUED | OUTPATIENT
Start: 2025-04-16 | End: 2025-04-16 | Stop reason: HOSPADM

## 2025-04-16 RX ORDER — OXYCODONE HYDROCHLORIDE 5 MG/1
10 TABLET ORAL EVERY 4 HOURS PRN
Status: DISCONTINUED | OUTPATIENT
Start: 2025-04-16 | End: 2025-04-16 | Stop reason: HOSPADM

## 2025-04-16 RX ADMIN — WATER 2000 MG: 1 INJECTION INTRAMUSCULAR; INTRAVENOUS; SUBCUTANEOUS at 08:19

## 2025-04-16 RX ADMIN — FENTANYL CITRATE 25 MCG: 50 INJECTION INTRAMUSCULAR; INTRAVENOUS at 10:43

## 2025-04-16 RX ADMIN — FENTANYL CITRATE 25 MCG: 50 INJECTION INTRAMUSCULAR; INTRAVENOUS at 10:28

## 2025-04-16 RX ADMIN — PROPOFOL 80 MCG/KG/MIN: 10 INJECTION, EMULSION INTRAVENOUS at 08:21

## 2025-04-16 RX ADMIN — EPHEDRINE SULFATE 5 MG: 5 INJECTION INTRAVENOUS at 08:32

## 2025-04-16 RX ADMIN — KETOROLAC TROMETHAMINE 15 MG: 30 INJECTION, SOLUTION INTRAMUSCULAR at 10:03

## 2025-04-16 RX ADMIN — BUPIVACAINE 20 ML: 13.3 INJECTION, SUSPENSION, LIPOSOMAL INFILTRATION at 08:00

## 2025-04-16 RX ADMIN — SODIUM CHLORIDE, POTASSIUM CHLORIDE, SODIUM LACTATE AND CALCIUM CHLORIDE: 600; 310; 30; 20 INJECTION, SOLUTION INTRAVENOUS at 08:15

## 2025-04-16 RX ADMIN — FENTANYL CITRATE 25 MCG: 50 INJECTION INTRAMUSCULAR; INTRAVENOUS at 10:16

## 2025-04-16 RX ADMIN — MEPIVACAINE HYDROCHLORIDE 2.5 ML: 20 INJECTION, SOLUTION EPIDURAL; INFILTRATION at 07:55

## 2025-04-16 RX ADMIN — OXYCODONE 10 MG: 5 TABLET ORAL at 10:00

## 2025-04-16 RX ADMIN — PHENYLEPHRINE HYDROCHLORIDE 10 MCG/MIN: 10 INJECTION INTRAVENOUS at 08:53

## 2025-04-16 RX ADMIN — CELECOXIB 200 MG: 200 CAPSULE ORAL at 07:25

## 2025-04-16 RX ADMIN — BUPIVACAINE HYDROCHLORIDE 20 ML: 5 INJECTION, SOLUTION EPIDURAL; INTRACAUDAL; PERINEURAL at 08:00

## 2025-04-16 RX ADMIN — PROPOFOL 30 MG: 10 INJECTION, EMULSION INTRAVENOUS at 07:50

## 2025-04-16 RX ADMIN — HYDROMORPHONE HYDROCHLORIDE 0.25 MG: 1 INJECTION, SOLUTION INTRAMUSCULAR; INTRAVENOUS; SUBCUTANEOUS at 09:08

## 2025-04-16 RX ADMIN — ACETAMINOPHEN 1000 MG: 500 TABLET ORAL at 07:25

## 2025-04-16 RX ADMIN — EPHEDRINE SULFATE 5 MG: 5 INJECTION INTRAVENOUS at 08:53

## 2025-04-16 RX ADMIN — PROCHLORPERAZINE EDISYLATE 2.5 MG: 5 INJECTION INTRAMUSCULAR; INTRAVENOUS at 10:01

## 2025-04-16 RX ADMIN — FAMOTIDINE 20 MG: 10 INJECTION, SOLUTION INTRAVENOUS at 10:01

## 2025-04-16 RX ADMIN — HYDROMORPHONE HYDROCHLORIDE 0.25 MG: 1 INJECTION, SOLUTION INTRAMUSCULAR; INTRAVENOUS; SUBCUTANEOUS at 09:23

## 2025-04-16 RX ADMIN — TRANEXAMIC ACID 1000 MG: 100 INJECTION, SOLUTION INTRAVENOUS at 08:22

## 2025-04-16 RX ADMIN — DEXAMETHASONE SODIUM PHOSPHATE 8 MG: 4 INJECTION INTRA-ARTICULAR; INTRALESIONAL; INTRAMUSCULAR; INTRAVENOUS; SOFT TISSUE at 08:22

## 2025-04-16 RX ADMIN — FENTANYL CITRATE 25 MCG: 50 INJECTION INTRAMUSCULAR; INTRAVENOUS at 10:01

## 2025-04-16 RX ADMIN — SODIUM CHLORIDE, POTASSIUM CHLORIDE, SODIUM LACTATE AND CALCIUM CHLORIDE: 600; 310; 30; 20 INJECTION, SOLUTION INTRAVENOUS at 08:44

## 2025-04-16 ASSESSMENT — PAIN DESCRIPTION - ORIENTATION
ORIENTATION: LEFT
ORIENTATION: LEFT
ORIENTATION: LEFT;RIGHT

## 2025-04-16 ASSESSMENT — PAIN DESCRIPTION - LOCATION
LOCATION: KNEE

## 2025-04-16 ASSESSMENT — PAIN SCALES - GENERAL
PAINLEVEL_OUTOF10: 4
PAINLEVEL_OUTOF10: 6
PAINLEVEL_OUTOF10: 5
PAINLEVEL_OUTOF10: 6
PAINLEVEL_OUTOF10: 5

## 2025-04-16 ASSESSMENT — PAIN DESCRIPTION - PAIN TYPE: TYPE: ACUTE PAIN

## 2025-04-16 ASSESSMENT — PAIN DESCRIPTION - DESCRIPTORS
DESCRIPTORS: BURNING
DESCRIPTORS: BURNING
DESCRIPTORS: ACHING
DESCRIPTORS: DISCOMFORT;ACHING

## 2025-04-16 NOTE — ANESTHESIA PROCEDURE NOTES
Spinal Block    Patient location during procedure: holding area  End time: 4/16/2025 8:00 AM  Reason for block: primary anesthetic and at surgeon's request  Staffing  Performed: anesthesiologist   Anesthesiologist: Julio Stone MD  Performed by: Julio Stone MD  Authorized by: Julio Stone MD    Spinal Block  Patient position: sitting  Prep: ChloraPrep and site prepped and draped  Patient monitoring: cardiac monitor, continuous pulse ox, continuous capnometry, frequent blood pressure checks and oxygen  Approach: midline  Location: L3/L4  Provider prep: sterile gloves and mask  Local infiltration: lidocaine  Needle  Needle type: Quincke   Needle gauge: 22 G  Needle length: 3.5 in  Assessment  Swirl obtained: Yes  CSF: clear  Attempts: 1  Hemodynamics: stable  Preanesthetic Checklist  Completed: patient identified, IV checked, site marked, risks and benefits discussed, surgical/procedural consents, equipment checked, pre-op evaluation, timeout performed, anesthesia consent given, oxygen available, monitors applied/VS acknowledged, fire risk safety assessment completed and verbalized and blood product R/B/A discussed and consented

## 2025-04-16 NOTE — ANESTHESIA PRE PROCEDURE
tobacco: Never   Substance Use Topics    Alcohol use: Never                                Counseling given: Not Answered      Vital Signs (Current): There were no vitals filed for this visit.                                           BP Readings from Last 3 Encounters:   04/09/25 (!) 171/60   06/04/24 (!) 152/76   02/28/22 120/70       NPO Status:                                                                                 BMI:   Wt Readings from Last 3 Encounters:   04/09/25 94 kg (207 lb 2.3 oz)   07/18/24 84.8 kg (187 lb)   06/04/24 84.8 kg (187 lb)     There is no height or weight on file to calculate BMI.    CBC:   Lab Results   Component Value Date/Time    WBC 8.4 11/25/2019 10:36 PM    RBC 3.62 11/25/2019 10:36 PM    HGB 10.5 11/25/2019 10:36 PM    HCT 33.9 11/25/2019 10:36 PM    MCV 93.6 11/25/2019 10:36 PM    RDW 13.3 11/25/2019 10:36 PM     11/25/2019 10:36 PM       CMP:   Lab Results   Component Value Date/Time     11/25/2019 10:36 PM    K 4.2 11/25/2019 10:36 PM     11/25/2019 10:36 PM    CO2 28 11/25/2019 10:36 PM    BUN 29 11/25/2019 10:36 PM    CREATININE 0.8 08/04/2020 02:02 PM    CREATININE 1.18 11/25/2019 10:36 PM    GFRAA >60 08/04/2020 02:02 PM    AGRATIO 1.2 11/25/2019 10:36 PM    LABGLOM >60 08/04/2020 02:02 PM    GLUCOSE 133 11/25/2019 10:36 PM    CALCIUM 9.2 11/25/2019 10:36 PM    BILITOT 0.3 11/25/2019 10:36 PM    ALKPHOS 89 11/25/2019 10:36 PM    AST 13 11/25/2019 10:36 PM    ALT 25 11/25/2019 10:36 PM       POC Tests: No results for input(s): \"POCGLU\", \"POCNA\", \"POCK\", \"POCCL\", \"POCBUN\", \"POCHEMO\", \"POCHCT\" in the last 72 hours.    Coags:   Lab Results   Component Value Date/Time    PROTIME 10.0 04/09/2025 10:33 AM    INR 0.9 04/09/2025 10:33 AM       HCG (If Applicable): No results found for: \"PREGTESTUR\", \"PREGSERUM\", \"HCG\", \"HCGQUANT\"     ABGs: No results found for: \"PHART\", \"PO2ART\", \"ZHZ5IZN\", \"WXD0UWB\", \"BEART\", \"O7UYYHZL\"     Type & Screen (If

## 2025-04-16 NOTE — PROGRESS NOTES
Physical Therapy    Patient is cleared for discharge from PT standpoint:  YES [x]     NO []     LEV GlynnT

## 2025-04-16 NOTE — PROGRESS NOTES
Visited patient in PACU.  Patient given RW from consignment for home use.     Discussed the importance of using pain medication and other methods for pain control such as ice/rest/elevate, pre-medicating prior to physical therapy.  Discussed the importance of being safe at hospital and home by using RW until cleared by PT, wearing safe shoes, preparing home for after surgery and having a  to help at home.  Discussed the importance of home PT, daily exercises as instructed by physical therapist and post-op appointment with surgeon and the need for transportation to this appointment until the surgeon has cleared you for driving.    Discussed risk after surgery and the importance of preventing infection by good hand hygiene, using clean towels and wash cloths at each shower, inspect wound/dressing daily, moving every two hours while awake, using the incentive spirometer every hour while awake.  When to call the doctor for help, or when to call 911 for emergency.  Opportunity given for patient/family to ask additional questions about any special concerns regarding your care while on the Ortho unit and preparing for discharge.

## 2025-04-16 NOTE — PROGRESS NOTES
Spiritual Health History and Assessment/Progress Note  [facility]    Pre-Op,  ,  ,      Name: Aleksander Martin MRN: [id number]    Age: 75 y.o.     Sex: female   Language: English   Yazdanism: Oriental orthodox   Primary osteoarthritis of left knee     Date: 4/16/2025            Total Time Calculated: 10 min              Spiritual Assessment began in [facility]        Referral/Consult From: Rounding   Encounter Overview/Reason: Pre-Op  Service Provided For: Patient    Tamar, Belief, Meaning:   Patient identifies as spiritual, is connected with a tamar tradition or spiritual practice, and has beliefs or practices that help with coping during difficult times  Family/Friends No family/friends present      Importance and Influence:  Patient has spiritual/personal beliefs that influence decisions regarding their health  Family/Friends No family/friends present    Community:  Patient is connected with a spiritual community and feels well-supported. Support system includes: Tamar Community and Friends  Family/Friends No family/friends present    Assessment and Plan of Care:     Patient Interventions include: Facilitated expression of thoughts and feelings, Explored spiritual coping/struggle/distress, Engaged in theological reflection, and Affirmed coping skills/support systems  Family/Friends Interventions include: No family/friends present    Patient Plan of Care: Contact Tamar  for support or sacramental needs and Spiritual Care available upon further referral  Family/Friends Plan of Care: Spiritual Care available upon further referral    Electronically signed by Chaplain Demond on 4/16/2025 at 8:23 AM

## 2025-04-16 NOTE — DISCHARGE SUMMARY
Ortho Discharge Summary    Patient ID:  Aleksander Martin  [id number]  female  75 y.o.  1950    Admit date: 4/16/2025    Discharge date: 4/16/2025    Admitting Physician: Davide Alonso MD     Consulting Physician(s):   Treatment Team:   Davide Alonso MD Stewart, Wells, MD Thompson, Lynn, PT  Susan Sharpe RN    Date of Surgery:   4/16/2025     Preoperative Diagnosis:  Primary osteoarthritis of left knee [M17.12]    Postoperative Diagnosis:   * No post-op diagnosis entered *    Procedure(s):   LEFT TOTAL KNEE REPLACEMENT (CECELIA)-FAST TRACK     Anesthesia Type:   Spinal     Surgeon: Davide Alonso MD                            HPI:  Pt is a 75 y.o. female who has a history of Primary osteoarthritis of left knee [M17.12]  with pain and limitations of activities of daily living who presents at this time for a  LEFT TOTAL KNEE REPLACEMENT (CECELIA)-FAST TRACK following the failure of conservative management.    PMH:   Past Medical History:   Diagnosis Date    Asthma     At risk for sleep apnea 04/09/2025    stop bang score 4    Basal cell adenocarcinoma     Diabetes (HCC)     Essential hypertension 3/6/2019    Gastrointestinal disorder     diverticulitis    Hypokalemia 8/4/2016    Ovarian cyst     PAF (paroxysmal atrial fibrillation) (HCC) 3/6/2019    no longer relevant    Paroxysmal atrial flutter (HCC) 3/6/2019    Stomach ulcer        Body mass index is 31.72 kg/m². : A BMI > 30 is classified as obesity and > 40 is classified as morbid obesity.     Medications upon admission :   Prior to Admission Medications   Prescriptions Last Dose Informant Patient Reported? Taking?   Apoaequorin (PREVAGEN) 10 MG CAPS 4/15/2025  Yes Yes   Sig: Take by mouth   Multiple Vitamins-Minerals (THERAPEUTIC MULTIVITAMIN-MINERALS) tablet 4/15/2025  Yes Yes   Sig: Take 1 tablet by mouth daily   albuterol sulfate HFA (PROVENTIL;VENTOLIN;PROAIR) 108 (90 Base) MCG/ACT inhaler Past Month  Yes Yes   Sig: Inhale 2 puffs into the lungs every

## 2025-04-16 NOTE — PROGRESS NOTES
Ortho / Neurosurgery NP Note    POD# 0  s/p LEFT TOTAL KNEE REPLACEMENT (CECELIA)-FAST TRACK         Patient seen in PACU.   Reports mild burning post op pain. Aware of PRN medications.  Reports expected numbness around L knee, but BLE intact.   Has not yet had PO. Denies nausea  Very eager to DC home today    Lives alone-her twin sister and brother in law will be staying with her post discharge    Hx of DM II-hgb A1C 7.2 in preop notes. Referral to Diabetes Management placed .     VSS Afebrile.    Visit Vitals  BP (!) 148/67   Pulse 65   Temp 97.5 °F (36.4 °C) (Oral)   Resp 14   Ht 1.664 m (5' 5.5\")   Wt 87.8 kg (193 lb 9 oz)   SpO2 100%   BMI 31.72 kg/m²       Voiding status: due to void            Labs    Lab Results   Component Value Date/Time    HGB 10.5 11/25/2019 10:36 PM      Lab Results   Component Value Date/Time    INR 0.9 04/09/2025 10:33 AM      Lab Results   Component Value Date/Time     11/25/2019 10:36 PM    K 4.2 11/25/2019 10:36 PM     11/25/2019 10:36 PM    CO2 28 11/25/2019 10:36 PM    BUN 29 11/25/2019 10:36 PM     Recent Glucose Results:   Glucose   Date Value Ref Range Status   11/25/2019 133 (H) 65 - 100 mg/dL Final   06/17/2019 152 (H) 65 - 99 mg/dL Final           Body mass index is 31.72 kg/m². : A BMI > 30 is classified as obesity and > 40 is classified as morbid obesity.     Awake and alert. No acute distress.    Dressing: Ace Wrap C.D.I.   No significant erythema or swelling  Cryotherapy in place over incision.   Calves soft and supple; No pain with passive stretch    BLE sensation to light touch intact  BLE motor intact.  +PF/DF/EHL intact Strength 5/5    SCD for mechanical DVT proph while in bed        PLAN:  1) PT  - WBAT.   2) DVT Prophylaxis: Aspirin 81 mg BID   3) GI Prophylaxis - Pepcid   4) Pain control - scheduled tylenol  and toradol, and prn  oxycodone    5) Readiness for discharge:     [x] Vital Signs stable    [] + Voiding    [x] Wound intact, drainage minimal

## 2025-04-16 NOTE — PROGRESS NOTES
FAST TRACK TOTAL KNEE REPLACEMENT   PHYSICAL THERAPY EVALUATION/DISCHARGE      Patient: Aleksander Martin (75 y.o. female)   Date: 4/16/2025   Primary Diagnosis: Primary osteoarthritis of left knee [M17.12]   Procedure(s) (LRB):  LEFT TOTAL KNEE REPLACEMENT (CECELIA)-FAST TRACK (Left) * Day of Surgery *    Precautions: Restrictions/Precautions: Weight Bearing   Lower Extremity Weight Bearing Restrictions  Left Lower Extremity Weight Bearing: Weight Bearing As Tolerated                   ASSESSMENT :   DEFICITS/IMPAIRMENTS:   The patient is limited by decreased functional mobility, activity tolerance, increased pain levels due to POD #0 Procedure(s) (LRB):  LEFT TOTAL KNEE REPLACEMENT (CECELIA)-FAST TRACK (Left).     Based on the impairments listed above, the patient is mobilizing with expected post-op deficits. Patient completes functional transfers, ambulation, and stair negotiation with SBA/contact guard assist. Patient received in Fast Track room and agreeable to participate, sister also present.  Patient with good tolerance of exercises then came to stand with CGA to RW.  Ambulated x approx 120 feet with RW, gait steady with step through pattern, note left knee flexed through gait cycle.  Required cues to improve upright posture.  Went up and down 4 steps with left rail and CGA, cues for sequencing, sister present for teaching.  Left up in chair with RN in room.       Patient is cleared for discharge from PT standpoint:  YES [x]   NO []    Vitals signs were stable during PT evaluation:  YES [x]   NO []    Patient has RW and it was adjusted appropriately:  YES [x]   NO []    Patient voided during PT evaluation:  YES []   NO [x] Voided prior to visit     Functional Outcome Measure:  The patient scored 23/24 on the [facility] outcome measure which is indicative of Cutoff score <=171,2,3 had higher odds of discharging home with home health or need of SNF/IPR.  .         PLAN :      Recommendation for discharge: (in order for  discussed with: Ortho NP and Registered Nurse    Patient Education  Education Given To: Patient;Family  Education Provided: Role of Therapy;Plan of Care;Transfer Training;Mobility Training;Fall Prevention Strategies;Home Exercise Program  Education Method: Demonstration;Verbal  Barriers to Learning: None  Education Outcome: Verbalized understanding;Demonstrated understanding    Thank you for this referral.  Jane Castro, PT  Minutes: 31    Physical Therapy Evaluation Charge Determination   History Examination Presentation Decision-Making   MEDIUM  Complexity : 1-2 comorbidities / personal factors will impact the outcome/ POC  LOW Complexity : 1-2 Standardized tests and measures addressing body structure, function, activity limitation and / or participation in recreation  LOW Complexity : Stable, uncomplicated  AM-PAC  LOW    Based on the above components, the patient evaluation is determined to be of the following complexity level: Low

## 2025-04-16 NOTE — ANESTHESIA POSTPROCEDURE EVALUATION
Department of Anesthesiology  Postprocedure Note    Patient: Aleksander Martin  MRN: [id number]  YOB: 1950  Date of evaluation: 4/16/2025    Procedure Summary       Date: 04/16/25 Room / Location: [facility] MAIN OR  / [facility] MAIN OR    Anesthesia Start: 0815 Anesthesia Stop: 0948    Procedure: LEFT TOTAL KNEE REPLACEMENT (CECELIA)-FAST TRACK (Left: Knee) Diagnosis:       Primary osteoarthritis of left knee      (Primary osteoarthritis of left knee [M17.12])    Providers: Davide Alonso MD Responsible Provider: Julio Stone MD    Anesthesia Type: Regional, MAC, Spinal ASA Status: 3            Anesthesia Type: Regional, MAC, Spinal    Ameena Phase I: Ameena Score: 10    Ameena Phase II:      Anesthesia Post Evaluation    Patient location during evaluation: PACU  Patient participation: complete - patient participated  Level of consciousness: awake and alert  Airway patency: patent  Nausea & Vomiting: no nausea  Cardiovascular status: hemodynamically stable  Respiratory status: acceptable  Hydration status: euvolemic  Multimodal analgesia pain management approach  Pain management: adequate    No notable events documented.

## 2025-04-16 NOTE — INTERVAL H&P NOTE
Update History & Physical    The patient's History and Physical of March 31, 2025 was reviewed with the patient and I examined the patient. There was no change. The surgical site was confirmed by the patient and me.     Plan: The risks, benefits, expected outcome, and alternative to the recommended procedure have been discussed with the patient. Patient understands and wants to proceed with the procedure.     Electronically signed by Eben Torres PA-C on 4/16/2025 at 7:00 AM

## 2025-04-16 NOTE — PERIOP NOTE
1140: Report received from FABY Ashley for transfer of care to Phase 2/Fast Track.     1154: Patient arrived to Phase 2, alert & oriented, tolerating PO intake. Reports 5/10 burning pain to knee, but tolerable. Assisted to dress and obtained VS. Up to recliner.     1216: Patient request to void. Ambulated with walker to restroom with assistance of nurse.    1219: PT order released. PT already on unit and aware. Patient sister back to work with PT.     1254: Notified Monique Graves NP that patient has been cleared from PT, tolerating PO intake, and successful void. Continuing with discharge process.     1325: Discharge instructions reviewed with patient and her sister, Jim. Opportunity for questions/answers given, able to verbalize understanding. Patient provided with rolling walker, additional ice packs for wrap, and extra dressing for incision if needed. Family has already acquired prescriptions. Denies any further needs. Patient is very excited to be discharged home today. PIV removed. Escorted out for discharge home via wheelchair by RN.

## 2025-04-16 NOTE — ANESTHESIA PROCEDURE NOTES
Peripheral Block    Patient location during procedure: holding area  Reason for block: post-op pain management and at surgeon's request  Start time: 4/16/2025 8:00 AM  End time: 4/16/2025 8:10 AM  Staffing  Performed: anesthesiologist   Anesthesiologist: Julio Stone MD  Performed by: Julio Stone MD  Authorized by: Julio Stone MD    Preanesthetic Checklist  Completed: patient identified, IV checked, site marked, risks and benefits discussed, surgical/procedural consents, equipment checked, pre-op evaluation, timeout performed, anesthesia consent given, oxygen available, monitors applied/VS acknowledged, fire risk safety assessment completed and verbalized and blood product R/B/A discussed and consented  Peripheral Block   Patient position: supine  Prep: ChloraPrep  Provider prep: mask and sterile gloves  Patient monitoring: cardiac monitor, continuous pulse ox, continuous capnometry, frequent blood pressure checks, IV access, oxygen and responsive to questions  Block type: Femoral (and superomedial, inferomedial, and superolateral genicular nerve blocks)  Adductor canal  Laterality: left  Injection technique: single-shot  Guidance: nerve stimulator and ultrasound guided    Needle   Needle type: insulated echogenic nerve stimulator needle   Needle gauge: 20 G  Needle localization: ultrasound guidance and nerve stimulator  Needle length: 10 cm  Assessment   Injection assessment: negative aspiration for heme, no paresthesia on injection, local visualized surrounding nerve on ultrasound and no intravascular symptoms  Paresthesia pain: none  Slow fractionated injection: yes  Hemodynamics: stable  Outcomes: uncomplicated and patient tolerated procedure well

## 2025-04-16 NOTE — OP NOTE
OPERATIVE REPORT    FACILITY: [facility]    PATIENT NAME: Aleksander Martin     DATE OF OPERATION: 4/16/25    PREOPERATIVE DIAGNOSIS: Left knee end stage osteoarthritis    POSTOPERATIVE DIAGNOSIS: same    SURGERIES PERFORMED:   Left total knee arthroplasty    ATTENDING PHYSICIAN: Davide Alonso MD    ASSISTANT: Eben Torres PA-C (Performing all or most of the following assistant-at-surgery services including but not limited to: proper patient positioning, sterile/prep and draping, placement of instruments/trackers, operative exposure, minor portions of bone / soft tissue excision, final irrigation and debridement, deep and superficial closure, application of final dressings)    IMPLANTS:    Gabriele Triathlon cementless size 4 femur, 5 tibia, 9 mm CS poly,     SPECIMENS:  none    OPERATIVE FINDINGS: End stage osteoarthritis. Stable total knee at conclusion.    ANESTHESIA: spinal plus regional    FLUIDS: Please see anesthesia record    ESTIMATED BLOOD LOSS: 10 cc    TOURNIQUET TIME: 48 min    INDICATIONS FOR PROCEDURE: Aleksander Martin is a 75 y.o. female who presented to clinic with left knee pain. Radiographs demonstrated advanced arthritic changes of the affected knee. They were counseled on the risks, benefits, and alternatives to surgery. Risks were outlined to include, but were not limited to: bleeding, infection, fracture, damage to blood vessels or nerves, hardware failure, loosening, continued pain, stiffness, leg length inequality, and medical risks including heart attack, stroke, blood clot, and even death. The patient elected to continue with the operation, and gave informed consent to do so.    DETAILED DESCRIPTION OF PROCEDURE:   The patient was identified in the preoperative holding area. The operative site was marked. The nature of procedure was reviewed and informed consent was confirmed. The patient was evaluated by anesthesia and a regional block was completed.  266 mg of Exparel were used for the

## 2025-07-31 NOTE — PROGRESS NOTES
"Recommendations from today's MTM visit:                                                      I will follow up with [facility] regarding testosterone ordering     I will look into oxytocin coverage before the trip, will verify with Dr. Menon if this is okay to go without during trip if not able to get    3. SICK DAY RULES FOR PATIENTS ON STEROID REPLACEMENT:      Mild illness (cold without fever; routine physical training; psychological stress like a job interview or exam)   No adjustment required.  You can take your regular dose of prednisone     Moderate illness: cold/cough or other infection with fever over 100.4 Fahrenheit; 1 episode of vomiting; 1-2 episodes of diarrhea)   Double the dose of steroid during the period of illness     Major illness: (cough or infection with significant fever, diarrhea/vomiting and able to keep down tablets)   Triple your dose of steroid during the period of illness.     Vomiting, severe diarrhea:   Repeat the dose if you vomit within 1 hour of taking medication.  If you vomit again, have another episode of diarrhea, or are unable to keep fluids down, give the intramuscular injection of hydrocortisone. Call 911 after giving the injection or go to the nearest emergency room.     Severe physical trauma (major injury with substantial blood loss, fracture, or shock), signs or symptoms of adrenal crisis or unconsciousness   Give the intramuscular injection of hydrocortisone.  Call 911 after giving the injection or go to the nearest emergency room.      Follow-up: Via My Chart after able to discuss with [facility], Oxytocin coverage, and consulting with Dr. Menon    It was great speaking with you today.  I value your experience and would be very thankful for your time in providing feedback in our clinic survey. In the next few days, you may receive an email or text message from [employer] with a link to a survey related to your  clinical pharmacist.\"     To schedule " Hospitalist Progress Note Thiago Ahuja MD 
Office: [phone] Date of Service:  11/15/2018 NAME:  Joey Gaines 
:  1950 MRN:  [id number] Admission Summary:  
 The patient is a 78-year-old female with past medical history of diverticulitis, history of mild intermittent asthma who presents to the hospital.  Patient reports that about 5 days back, she started feeling weak, fatigued, felt like Lev Casanova has been beaten up with a pipe\", thought she got the flu and started feeling not well C/c sob. Interval history / Subjective:  
  Her breathing is better but still feels very weak, and lightheaded. Lactic acid of 5 this morning. Assessment & Plan:  
 
Shortness of breath, possibly asthma exacerbation with superimposed upper respiratory infection, Respiratory panel, flu, RSV negative. Will cont with IV steroids, nebs. Added ceftriaxone. ABGs today showed hypoxia. -CTA no PE. Still has wheezing on exam 
 
Lactic acidosis: of 5.1 this AM. Ceftriaxone added, will do Q4h lactic acid, f/u on cultures. UA negative. Will repeat CXR in AM, if any early pneumonia. ? Albuterol Hyperglycemia: A1c of 8, new DM, diabetic education, SSI, decreased dose of IV steroids. Carb diet. PT consulted. Code status: full DVT prophylaxis: SQ heparin. Care Plan discussed with: Patient/Family and Nurse Disposition: TBD Hospital Problems  Date Reviewed: 2018 Codes Class Noted POA * (Principal) SOB (shortness of breath) ICD-10-CM: R06.02 
ICD-9-CM: 786.05  2018 Unknown Review of Systems: A comprehensive review of systems was negative except for that written in the HPI. Vital Signs:  
 Last 24hrs VS reviewed since prior progress note. Most recent are: 
Visit Vitals /77 (BP 1 Location: Left arm, BP Patient Position: At rest) Pulse 61 Temp 97.6 °F (36.4 °C) Resp 20 Ht 5' 7\" (1.702 m) Wt 105.9 kg (233 lb 7.5 oz) SpO2 93% Breastfeeding? No  
BMI 36.57 kg/m² No intake or output data in the 24 hours ending 11/15/18 1836 Physical Examination:  
 
 
     
Constitutional:  No acute distress, cooperative, pleasant   
ENT:  Oral mucous moist, oropharynx benign. Neck supple, Resp:   Wheezing bilaterally, decreased respiratory effort. No accessory muscle use CV:  Regular rhythm, normal rate, no murmurs, gallops, rubs GI:  Soft, non distended, non tender. normoactive bowel sounds, no hepatosplenomegaly Musculoskeletal:  No edema, warm, 2+ pulses throughout Neurologic:  Moves all extremities. AAOx3, CN II-XII reviewed Data Review:  
 Review and/or order of clinical lab test 
Review and/or order of tests in the radiology section of CPT Review and/or order of tests in the medicine section of CPT Labs:  
 
Recent Labs 11/15/18 
0318 11/14/18 
1637 WBC 6.6 9.0 HGB 10.8* 11.7 HCT 33.2* 36.6  287 Recent Labs 11/15/18 
0318 11/14/18 
1637  139  
K 3.7 3.6  100 CO2 24 29 BUN 20 22* CREA 0.89 0.94 * 331* CA 8.3* 8.6 Recent Labs 11/14/18 
1637 SGOT 12* ALT 20 AP 84 TBILI 0.3 TP 7.1 ALB 3.4*  
GLOB 3.7 Recent Labs 11/14/18 
1637 INR 1.0 PTP 10.3 APTT 28.2 No results for input(s): FE, TIBC, PSAT, FERR in the last 72 hours. No results found for: FOL, RBCF No results for input(s): PH, PCO2, PO2 in the last 72 hours. Recent Labs 11/15/18 
0318 11/14/18 
2145 11/14/18 
1637 TROIQ <0.05 <0.05 <0.05 No results found for: CHOL, CHOLX, CHLST, CHOLV, HDL, LDL, LDLC, DLDLP, TGLX, TRIGL, TRIGP, CHHD, CHHDX Lab Results Component Value Date/Time  Glucose (POC) 219 (H) 11/15/2018 06:20 PM  
 Glucose (POC) 247 (H) 11/15/2018 11:53 AM  
 Glucose (POC) 325 (H) 11/15/2018 06:21 AM  
 Glucose (POC) 228 (H) 11/14/2018 11:42 PM  
 another MTM appointment, please call the clinic directly or you may call the MTM scheduling line at [phone].    My Clinical Pharmacist's contact information:                                                      Please feel free to contact me with any questions or concerns you have.      Ling Agustin), PharmD  Endocrine & Diabetes Medication Therapy Management Pharmacist   [address] [place] [id number]     Contact information:   To schedule a MTM appointment: [phone]  For questions or concerns, please send a [employer] message or call the clinic at [phone].    For more urgent concerns that do not require 040, please call [phone] after hours/weekends and ask to speak with the Endocrinologist on call.      Glucose (POC) 104 (H) 08/08/2016 11:17 AM  
 
Lab Results Component Value Date/Time Color YELLOW/STRAW 11/14/2018 07:54 PM  
 Appearance CLEAR 11/14/2018 07:54 PM  
 Specific gravity >1.030 (H) 11/14/2018 07:54 PM  
 pH (UA) 5.5 11/14/2018 07:54 PM  
 Protein NEGATIVE  11/14/2018 07:54 PM  
 Glucose 500 (A) 11/14/2018 07:54 PM  
 Ketone NEGATIVE  11/14/2018 07:54 PM  
 Bilirubin NEGATIVE  11/14/2018 07:54 PM  
 Urobilinogen 0.2 11/14/2018 07:54 PM  
 Nitrites NEGATIVE  11/14/2018 07:54 PM  
 Leukocyte Esterase NEGATIVE  11/14/2018 07:54 PM  
 Epithelial cells FEW 11/14/2018 07:54 PM  
 Bacteria NEGATIVE  11/14/2018 07:54 PM  
 WBC 0-4 11/14/2018 07:54 PM  
 RBC 0-5 11/14/2018 07:54 PM  
 
 
 
Medications Reviewed:  
 
Current Facility-Administered Medications Medication Dose Route Frequency  cefTRIAXone (ROCEPHIN) 1 g in 0.9% sodium chloride (MBP/ADV) 50 mL  1 g IntraVENous Q24H  
 albuterol-ipratropium (DUO-NEB) 2.5 MG-0.5 MG/3 ML  3 mL Nebulization Q4H PRN  
 methylPREDNISolone (PF) (SOLU-MEDROL) injection 40 mg  40 mg IntraVENous Q8H  
 escitalopram oxalate (LEXAPRO) tablet 10 mg  10 mg Oral DAILY  HYDROcodone-acetaminophen (NORCO) 5-325 mg per tablet 1 Tab  1 Tab Oral Q4H PRN  
 sodium chloride (NS) flush 5-10 mL  5-10 mL IntraVENous Q8H  
 sodium chloride (NS) flush 5-10 mL  5-10 mL IntraVENous PRN  
 0.9% sodium chloride infusion  75 mL/hr IntraVENous CONTINUOUS  
 azithromycin (ZITHROMAX) 500 mg in 0.9% sodium chloride 250 mL ([id number])  500 mg IntraVENous Q24H  
 heparin (porcine) injection 5,000 Units  5,000 Units SubCUTAneous Q8H  
 glucose chewable tablet 16 g  4 Tab Oral PRN  
 dextrose (D50W) injection syrg 12.5-25 g  25-50 mL IntraVENous PRN  
 glucagon (GLUCAGEN) injection 1 mg  1 mg IntraMUSCular PRN  
 insulin lispro (HUMALOG) injection   SubCUTAneous AC&HS  
 arformoterol (BROVANA) neb solution 15 mcg  15 mcg Nebulization BID RT  And  
  budesonide (PULMICORT) 500 mcg/2 ml nebulizer suspension  500 mcg Nebulization BID RT  
 
______________________________________________________________________ EXPECTED LENGTH OF STAY: - - - 
ACTUAL LENGTH OF STAY:          0 Thiago Ahuja MD

## (undated) DEVICE — BANDAGE COMPR M W6INXL10YD WHT BGE VELC E MTRX HK AND LOOP

## (undated) DEVICE — SUTURE STRATAFIX SZ 3-0 30CM NONABSORB UD 26MM FS 3/8 SXMP2B412

## (undated) DEVICE — DRESSING SURG 4X14 IN POSTOP SIL BORD MEPILEX

## (undated) DEVICE — SPONGE GZ W4XL4IN COT 12 PLY TYP VII WVN C FLD DSGN STERILE

## (undated) DEVICE — ELECTRODE BLDE L4IN NONINSULATED EDGE

## (undated) DEVICE — 60-7070-105 TRNQT,DPSB,PLC BLUE: Brand: MEDLINE RENEWAL

## (undated) DEVICE — HANDPIECE SET WITH COAXIAL HIGH FLOW TIP AND SUCTION TUBE: Brand: INTERPULSE

## (undated) DEVICE — DRAPE,ORTHOMAX,EXTREMITY: Brand: MEDLINE

## (undated) DEVICE — 3M™ IOBAN™ 2 ANTIMICROBIAL INCISE DRAPE 6648EZ: Brand: IOBAN™ 2

## (undated) DEVICE — LIQUIBAND RAPID ADHESIVE 36/CS 0.8ML: Brand: MEDLINE

## (undated) DEVICE — TRANSFER SET 3": Brand: MEDLINE INDUSTRIES, INC.

## (undated) DEVICE — SOLUTION PULSED LAVAGE XPERIENCE 500ML NO-RINSE

## (undated) DEVICE — TOTAL JOINT-MRMC: Brand: MEDLINE INDUSTRIES, INC.

## (undated) DEVICE — SUTURE STRATAFIX SYMMETRIC SZ 1 L18IN ABSRB VLT CT1 L36CM SXPP1A404

## (undated) DEVICE — APPLICATOR MEDICATED 26 CC SOLUTION HI LT ORNG CHLORAPREP

## (undated) DEVICE — HOOD: Brand: FLYTE

## (undated) DEVICE — PIN BNE FIX L110MM DIA32MM

## (undated) DEVICE — SUTURE MONOCRYL SZ 2-0 L36IN ABSRB UD L36MM CT-1 1/2 CIR Y945H

## (undated) DEVICE — SOLUTION IRRIG 1000ML 0.9% SOD CHL CONT

## (undated) DEVICE — DRAPE,U/ SHT,SPLIT,PLAS,STERIL: Brand: MEDLINE

## (undated) DEVICE — NEEDLE SPNL L3.5IN PNK HUB S STL REG WALL FIT STYL W/ QNCKE

## (undated) DEVICE — GLOVE ORTHO 8   MSG9480

## (undated) DEVICE — DUAL CUT SAGITTAL BLADE

## (undated) DEVICE — KIT DRP FOR RIO ROBOTIC ARM ASST SYS

## (undated) DEVICE — SOLUTION ANTISEP 70% ISO ALC RUBBING 4 OZ

## (undated) DEVICE — KIT TRK KNEE PROC VIZADISC

## (undated) DEVICE — SUTURE VICRYL SZ 1 L36IN ABSRB UD L36MM CT-1 1/2 CIR J947H

## (undated) DEVICE — GOWN,SIRUS,NONRNF,SETINSLV,2XL,18/CS: Brand: MEDLINE

## (undated) DEVICE — PIN BNE FIX L140MM DIA3.2MM SELF DRL

## (undated) DEVICE — GLOVE SURG SZ 85 L12IN FNGR THK79MIL GRN LTX FREE

## (undated) DEVICE — SOLUTION IRRIG 1000ML H2O PIC PLAS SHATTERPROOF CONTAINER